# Patient Record
Sex: FEMALE | Race: WHITE | Employment: OTHER | URBAN - NONMETROPOLITAN AREA
[De-identification: names, ages, dates, MRNs, and addresses within clinical notes are randomized per-mention and may not be internally consistent; named-entity substitution may affect disease eponyms.]

---

## 2017-10-04 ENCOUNTER — HOSPITAL ENCOUNTER (OUTPATIENT)
Dept: MAMMOGRAPHY | Age: 67
Discharge: HOME | End: 2017-10-04
Payer: MEDICARE

## 2017-10-04 DIAGNOSIS — Z12.39 BREAST CANCER SCREENING: ICD-10-CM

## 2017-10-04 PROCEDURE — G0202 SCR MAMMO BI INCL CAD: HCPCS

## 2017-10-11 ENCOUNTER — HOSPITAL ENCOUNTER (OUTPATIENT)
Dept: WOMENS IMAGING | Age: 67
Discharge: HOME | End: 2017-10-11
Payer: MEDICARE

## 2017-10-11 DIAGNOSIS — R92.1 CALCIFICATION OF RIGHT BREAST: ICD-10-CM

## 2017-10-11 PROCEDURE — G0206 DX MAMMO INCL CAD UNI: HCPCS

## 2017-10-23 ENCOUNTER — HOSPITAL ENCOUNTER (OUTPATIENT)
Dept: WOMENS IMAGING | Age: 67
Discharge: HOME | End: 2017-10-23
Payer: MEDICARE

## 2017-10-23 VITALS
TEMPERATURE: 97.8 F | HEART RATE: 70 BPM | SYSTOLIC BLOOD PRESSURE: 138 MMHG | DIASTOLIC BLOOD PRESSURE: 71 MMHG | RESPIRATION RATE: 20 BRPM

## 2017-10-23 DIAGNOSIS — R92.1 BREAST CALCIFICATIONS: ICD-10-CM

## 2017-10-23 PROCEDURE — 2720000010 HC SURG SUPPLY STERILE

## 2017-10-23 PROCEDURE — 88305 TISSUE EXAM BY PATHOLOGIST: CPT

## 2017-10-23 PROCEDURE — 19081 BX BREAST 1ST LESION STRTCTC: CPT

## 2017-10-23 PROCEDURE — G0206 DX MAMMO INCL CAD UNI: HCPCS

## 2017-10-23 PROCEDURE — A4648 IMPLANTABLE TISSUE MARKER: HCPCS

## 2017-10-23 NOTE — PROGRESS NOTES
Breast Biopsy Flowsheet/Post-Operative Care    Date of Procedure: 10/23/2017  Physician: Armida Israel  Technologist: Marcos Mi RT (R)(M)(QM)    Biopsy:stereotactic breast biopsy  Lesion type: [] Palpable     [x] Non-palpable  Breast: right    Clock face position: Site #1:  Upper inner, Posterior depth         Primary Method of Detection: [] Palpation    [x] Mammogram    [] Ultrasound   [] Mass:      [x] Microcalcifications:   [] Pleomorphic   [] Increasing Number   Distribution:  [x] Grouped [] Linear [] Regional    Asymmetry: n/a    Biopsy Method:   Mammotome:    Site #1     Gauge:  10     # of Passes: 6     Clip:   [] \"Ribbon\"   [] \"O\"     [] \"M\"      [] \"X\"      [x] \"tribell\"       [] \"Bowtie\"  [] \"U\"        Pre-Op Assessment: (BI-RADS)   [] 3. Probably Benign   [x] 4. Suspicious Abnormality   [] 5. Highly Suggestive of Malignancy      Patient Tolerated Procedure: good  Complications: none  Comments: none    Post Operative Care  Steri strips: Yes  Dressing: [] Pressure Dressing   [x] Gauze. Tape   Ice Applied to Site:  Yes  Evidence of Bleeding:  No    Pain Verbalized: No      Written Discharge Instructions: Yes  Condition at Discharge: good  Time of Discharge: 0900    Electronically signed by Yareli Abbasi RN on 10/23/2017 at 11:24 AM

## 2018-05-09 ENCOUNTER — HOSPITAL ENCOUNTER (OUTPATIENT)
Dept: WOMENS IMAGING | Age: 68
Discharge: HOME | End: 2018-05-09
Payer: MEDICARE

## 2018-05-09 DIAGNOSIS — R92.1 BREAST CALCIFICATIONS: ICD-10-CM

## 2018-05-09 PROCEDURE — G0279 TOMOSYNTHESIS, MAMMO: HCPCS

## 2018-10-05 ENCOUNTER — HOSPITAL ENCOUNTER (OUTPATIENT)
Dept: MAMMOGRAPHY | Age: 68
Discharge: HOME | End: 2018-10-05
Payer: MEDICARE

## 2018-10-05 DIAGNOSIS — Z12.31 VISIT FOR SCREENING MAMMOGRAM: ICD-10-CM

## 2018-10-05 PROCEDURE — 77067 SCR MAMMO BI INCL CAD: CPT

## 2020-08-16 ENCOUNTER — APPOINTMENT (OUTPATIENT)
Dept: GENERAL RADIOLOGY | Age: 70
End: 2020-08-16
Payer: MEDICARE

## 2020-08-16 ENCOUNTER — HOSPITAL ENCOUNTER (EMERGENCY)
Age: 70
Discharge: HOME | End: 2020-08-16
Attending: EMERGENCY MEDICINE
Payer: MEDICARE

## 2020-08-16 VITALS
DIASTOLIC BLOOD PRESSURE: 68 MMHG | BODY MASS INDEX: 31.89 KG/M2 | TEMPERATURE: 97.6 F | RESPIRATION RATE: 21 BRPM | HEIGHT: 63 IN | WEIGHT: 180 LBS | OXYGEN SATURATION: 94 % | SYSTOLIC BLOOD PRESSURE: 154 MMHG | HEART RATE: 71 BPM

## 2020-08-16 PROCEDURE — 99153 MOD SED SAME PHYS/QHP EA: CPT

## 2020-08-16 PROCEDURE — 90715 TDAP VACCINE 7 YRS/> IM: CPT | Performed by: EMERGENCY MEDICINE

## 2020-08-16 PROCEDURE — 6360000002 HC RX W HCPCS: Performed by: EMERGENCY MEDICINE

## 2020-08-16 PROCEDURE — 73030 X-RAY EXAM OF SHOULDER: CPT

## 2020-08-16 PROCEDURE — 94761 N-INVAS EAR/PLS OXIMETRY MLT: CPT

## 2020-08-16 PROCEDURE — 6370000000 HC RX 637 (ALT 250 FOR IP): Performed by: EMERGENCY MEDICINE

## 2020-08-16 PROCEDURE — 6360000002 HC RX W HCPCS: Performed by: PHYSICIAN ASSISTANT

## 2020-08-16 PROCEDURE — 23650 CLTX SHO DSLC W/MNPJ WO ANES: CPT

## 2020-08-16 PROCEDURE — 2500000003 HC RX 250 WO HCPCS

## 2020-08-16 PROCEDURE — 90471 IMMUNIZATION ADMIN: CPT | Performed by: EMERGENCY MEDICINE

## 2020-08-16 PROCEDURE — 2700000000 HC OXYGEN THERAPY PER DAY

## 2020-08-16 PROCEDURE — 96375 TX/PRO/DX INJ NEW DRUG ADDON: CPT

## 2020-08-16 PROCEDURE — 2580000003 HC RX 258: Performed by: PHYSICIAN ASSISTANT

## 2020-08-16 PROCEDURE — 99285 EMERGENCY DEPT VISIT HI MDM: CPT

## 2020-08-16 PROCEDURE — 94770 HC ETCO2 MONITOR DAILY: CPT

## 2020-08-16 PROCEDURE — 99152 MOD SED SAME PHYS/QHP 5/>YRS: CPT

## 2020-08-16 PROCEDURE — 96374 THER/PROPH/DIAG INJ IV PUSH: CPT

## 2020-08-16 RX ORDER — SODIUM CHLORIDE 9 MG/ML
INJECTION, SOLUTION INTRAVENOUS CONTINUOUS
Status: DISCONTINUED | OUTPATIENT
Start: 2020-08-16 | End: 2020-08-16 | Stop reason: HOSPADM

## 2020-08-16 RX ORDER — PROPOFOL 10 MG/ML
0.5 INJECTION, EMULSION INTRAVENOUS ONCE
Status: COMPLETED | OUTPATIENT
Start: 2020-08-16 | End: 2020-08-16

## 2020-08-16 RX ORDER — KETAMINE HCL IN NACL, ISO-OSM 100MG/10ML
SYRINGE (ML) INJECTION
Status: DISCONTINUED
Start: 2020-08-16 | End: 2020-08-16 | Stop reason: HOSPADM

## 2020-08-16 RX ORDER — OXYCODONE HYDROCHLORIDE AND ACETAMINOPHEN 5; 325 MG/1; MG/1
1 TABLET ORAL ONCE
Status: COMPLETED | OUTPATIENT
Start: 2020-08-16 | End: 2020-08-16

## 2020-08-16 RX ORDER — KETAMINE HYDROCHLORIDE 50 MG/ML
1 INJECTION, SOLUTION, CONCENTRATE INTRAMUSCULAR; INTRAVENOUS ONCE
Status: DISCONTINUED | OUTPATIENT
Start: 2020-08-16 | End: 2020-08-16 | Stop reason: HOSPADM

## 2020-08-16 RX ORDER — HYDROCODONE BITARTRATE AND ACETAMINOPHEN 5; 325 MG/1; MG/1
1 TABLET ORAL EVERY 6 HOURS PRN
Qty: 12 TABLET | Refills: 0 | Status: SHIPPED | OUTPATIENT
Start: 2020-08-16 | End: 2020-08-16 | Stop reason: SDUPTHER

## 2020-08-16 RX ORDER — HYDROCODONE BITARTRATE AND ACETAMINOPHEN 5; 325 MG/1; MG/1
1 TABLET ORAL EVERY 6 HOURS PRN
Qty: 12 TABLET | Refills: 0 | Status: SHIPPED | OUTPATIENT
Start: 2020-08-16 | End: 2020-08-19

## 2020-08-16 RX ORDER — ONDANSETRON 2 MG/ML
4 INJECTION INTRAMUSCULAR; INTRAVENOUS ONCE
Status: COMPLETED | OUTPATIENT
Start: 2020-08-16 | End: 2020-08-16

## 2020-08-16 RX ORDER — KETAMINE HCL IN NACL, ISO-OSM 100MG/10ML
SYRINGE (ML) INJECTION
Status: COMPLETED
Start: 2020-08-16 | End: 2020-08-16

## 2020-08-16 RX ORDER — MORPHINE SULFATE 4 MG/ML
4 INJECTION, SOLUTION INTRAMUSCULAR; INTRAVENOUS ONCE
Status: COMPLETED | OUTPATIENT
Start: 2020-08-16 | End: 2020-08-16

## 2020-08-16 RX ADMIN — ONDANSETRON 4 MG: 2 INJECTION INTRAMUSCULAR; INTRAVENOUS at 08:38

## 2020-08-16 RX ADMIN — Medication 60 MG: at 09:20

## 2020-08-16 RX ADMIN — PROPOFOL 18 MG: 10 INJECTION, EMULSION INTRAVENOUS at 09:24

## 2020-08-16 RX ADMIN — SODIUM CHLORIDE 100 ML: 9 INJECTION, SOLUTION INTRAVENOUS at 08:38

## 2020-08-16 RX ADMIN — MORPHINE SULFATE 4 MG: 4 INJECTION, SOLUTION INTRAMUSCULAR; INTRAVENOUS at 08:38

## 2020-08-16 RX ADMIN — OXYCODONE HYDROCHLORIDE AND ACETAMINOPHEN 1 TABLET: 5; 325 TABLET ORAL at 07:39

## 2020-08-16 RX ADMIN — TETANUS TOXOID, REDUCED DIPHTHERIA TOXOID AND ACELLULAR PERTUSSIS VACCINE, ADSORBED 0.5 ML: 5; 2.5; 8; 8; 2.5 SUSPENSION INTRAMUSCULAR at 07:40

## 2020-08-16 ASSESSMENT — ENCOUNTER SYMPTOMS
BACK PAIN: 0
WHEEZING: 0
COUGH: 0
DIARRHEA: 0
SORE THROAT: 0
SHORTNESS OF BREATH: 0
EYE PAIN: 0
EYE DISCHARGE: 0
RHINORRHEA: 0
COLOR CHANGE: 0
EYE ITCHING: 0
ABDOMINAL PAIN: 0
NAUSEA: 0
VOMITING: 0

## 2020-08-16 ASSESSMENT — PAIN DESCRIPTION - PAIN TYPE: TYPE: ACUTE PAIN

## 2020-08-16 ASSESSMENT — PAIN SCALES - GENERAL
PAINLEVEL_OUTOF10: 5
PAINLEVEL_OUTOF10: 10
PAINLEVEL_OUTOF10: 10
PAINLEVEL_OUTOF10: 4
PAINLEVEL_OUTOF10: 5

## 2020-08-16 ASSESSMENT — PAIN DESCRIPTION - ORIENTATION: ORIENTATION: LEFT

## 2020-08-16 ASSESSMENT — PAIN DESCRIPTION - LOCATION: LOCATION: ARM

## 2020-08-16 NOTE — ED PROVIDER NOTES
Procedural sedation    Date/Time: 2020 9:51 AM  Performed by: Shayna Carranza DO  Authorized by: Shayna Carranza DO     Consent:     Consent obtained:  Verbal    Consent given by:  Patient and spouse    Risks discussed:   Allergic reaction, inadequate sedation, dysrhythmia, nausea, vomiting, respiratory compromise necessitating ventilatory assistance and intubation, prolonged sedation necessitating reversal and prolonged hypoxia resulting in organ damage    Alternatives discussed:  Analgesia without sedation and anxiolysis  Indications:     Procedure performed:  Dislocation reduction    Procedure necessitating sedation performed by:  Different physician    Intended level of sedation:  Moderate (conscious sedation)  Pre-sedation assessment:     Time since last food or drink:  630    ASA classification: class 2 - patient with mild systemic disease      Mouth openin finger widths    Thyromental distance:  2 finger widths    Mallampati score:  II - soft palate, uvula, fauces visible    Pre-sedation assessments completed and reviewed: airway patency, cardiovascular function, hydration status, mental status, nausea/vomiting, pain level and respiratory function      History of difficult intubation: no      Pre-sedation assessment completed:  2020 9:29 AM  Immediate pre-procedure details:     Reassessment: Patient reassessed immediately prior to procedure      Reviewed: vital signs, relevant labs/tests and NPO status      Verified: bag valve mask available, emergency equipment available, intubation equipment available, IV patency confirmed and oxygen available    Procedure details (see MAR for exact dosages):     Sedation start time:  2020 9:29 AM    Preoxygenation:  Nasal cannula    Sedation:  Propofol    Analgesia:  Morphine    Intra-procedure monitoring:  Blood pressure monitoring and cardiac monitor    Intra-procedure events: none    Post-procedure details:     Post-sedation assessment completed:

## 2020-08-16 NOTE — ED NOTES
Hola and swathe applied per Dr. Shona Hinds, Dr. Rosey Bruce and Vern Aden, paramedic. Pt tolerated well.       Sabrina Romero RN  08/16/20 [id number]

## 2020-08-16 NOTE — ED NOTES
Conscious sedation for reduction of left shoulder. Consent form signed per pt's  per pt's request. States \"I'm left handed. \" Procedure explained per Trixie Pruitt and Dr. Royer Odom. Pt verbalized understanding. Time out called. Present in room: ODIN Olguin, Dr. Royer Odom, Dr. [facility] (resident phys), [place], [place], Tiffany EDUARDO.       Dimitri 139, RN  08/16/20 3110

## 2020-08-16 NOTE — ED NOTES
Pt provided updated med list. Unable to take meds this morning. Med rec updated and copy placed on pt's chart.       Dimitri Singh RN  08/16/20 7405

## 2020-08-16 NOTE — ED TRIAGE NOTES
Pt comes in through lobby. She apparently woke up on the ground out of bed on her left arm. She states \"I was dreaming and must of rolled out of bed\".  She states the pain is in her left upper arm and into her shoulder

## 2020-08-16 NOTE — ED PROVIDER NOTES
Ortho Injury    Date/Time: 8/16/2020 9:16 AM  Performed by: Del Levine MD  Authorized by: Joyce Healy DO   Consent: Verbal consent obtained. Written consent obtained. Risks and benefits: risks, benefits and alternatives were discussed  Consent given by: spouse and patient  Patient understanding: patient states understanding of the procedure being performed  Patient consent: the patient's understanding of the procedure matches consent given  Procedure consent: procedure consent matches procedure scheduled  Relevant documents: relevant documents present and verified  Test results: test results available and properly labeled  Site marked: the operative site was marked  Imaging studies: imaging studies available  Patient identity confirmed: verbally with patient and arm band  Time out: Immediately prior to procedure a \"time out\" was called to verify the correct patient, procedure, equipment, support staff and site/side marked as required.   Injury location: shoulder  Location details: left shoulder  Injury type: dislocation  Dislocation type: posterior  Hill-Sachs deformity: yes  Chronicity: new  Pre-procedure distal perfusion: normal  Pre-procedure neurological function: normal  Pre-procedure range of motion: normal    Anesthesia:  Local anesthesia used: no    Sedation:  Patient sedated: yes  Sedation type: moderate (conscious) sedation  Sedatives: propofol and ketamine  Analgesia: morphine  Sedation start date/time: 8/16/2020 9:16 AM    Manipulation performed: yes  Reduction method: traction and counter traction and Rody maneuver  Reduction successful: yes  X-ray confirmed reduction: yes  Immobilization: splint and sling  Supplies used: cotton padding  Post-procedure neurovascular assessment: post-procedure neurovascularly intact  Post-procedure distal perfusion: normal  Post-procedure neurological function: normal  Post-procedure range of motion: normal  Patient tolerance: Patient tolerated the procedure well with no immediate complications            Alexandra Reed MD  Resident  08/16/20 3850

## 2020-08-16 NOTE — ED NOTES
Pt placed on cardiac monitor and 2 L of O2 via NC. RT called and meds for conscious sedation at bedside. Pt and spouse updated on process and POC. Both verbalized understanding.       Dimitri Singh RN  08/16/20 2561

## 2020-08-16 NOTE — ED NOTES
ED nurse-to-nurse bedside report    Chief Complaint   Patient presents with    Fall    Arm Pain      LOC: alert and orientated to name, place, date  Vital signs   Vitals:    08/16/20 0639   BP: (!) 142/68   Pulse: 74   Resp: 16   Temp: 97.6 °F (36.4 °C)   TempSrc: Oral   SpO2: 96%   Weight: 180 lb (81.6 kg)   Height: 5' 3\" (1.6 m)      Pain:    Pain Interventions: N/A  Pain Goal: 3  Oxygen: No    Current needs required Room Air   Telemetry: No  LDAs:    Continuous Infusions:   Mobility: Independent  Quintana Fall Risk Score: No flowsheet data found.   Fall Interventions: N/A  Report given to: Taylor Perez RN  08/16/20 8756

## 2020-08-16 NOTE — ED PROVIDER NOTES
[facility] COMPLAINT       Chief Complaint   Patient presents with    Fall    Arm Pain       Nurses Notes reviewed and I agree except as notedin the HPI. HISTORY OF PRESENT ILLNESS    Polina Clinton is a 65 y.o. female who presents complains of left shoulder pain after rolling out of bed last night on the floor. She fell on the carpet. She not hit her head or lose consciousness. She is on any blood thinners. She denies any other injuries. Location/Symptom: Left shoulder pain  Timing/Onset: last night  Context/Setting: rolled out of bed  Quality: ache  Duration: constant  Modifying Factors: none  Severity: 6    REVIEW OF SYSTEMS     Review of Systems   Constitutional: Negative for activity change, appetite change, chills and fever. HENT: Negative for congestion, ear pain, rhinorrhea and sore throat. Eyes: Negative for pain, discharge and itching. Respiratory: Negative for cough, shortness of breath and wheezing. Cardiovascular: Negative for chest pain. Gastrointestinal: Negative for abdominal pain, diarrhea, nausea and vomiting. Genitourinary: Negative for difficulty urinating and dysuria. Musculoskeletal: Negative for arthralgias, back pain and myalgias. Left shoulder pain     Skin: Negative for color change and rash. Neurological: Negative for dizziness, seizures, light-headedness and headaches. Psychiatric/Behavioral: Negative for agitation, confusion, self-injury and suicidal ideas. All other systems reviewed and are negative. PAST MEDICAL HISTORY    has a past medical history of Arthritis, Diabetes mellitus ([address] Hypertension, MDRO (multiple drug resistant organisms) resistance, and Sleep apnea with use of continuous positive airway pressure (CPAP). SURGICAL HISTORY      has a past surgical history that includes Foot surgery (Bilateral);  Knee Arthroplasty (Bilateral); back surgery; spinal cord decompression (16); and Spinal fusion (16). CURRENT MEDICATIONS       Discharge Medication List as of 2020 10:46 AM      CONTINUE these medications which have NOT CHANGED    Details   empagliflozin (JARDIANCE) 10 MG tablet Take 10 mg by mouth dailyHistorical Med      glimepiride (AMARYL) 4 MG tablet Take 1 tablet by mouth every morning (before breakfast), Disp-60 tablet, R-0      metFORMIN (GLUCOPHAGE) 500 MG tablet Take by mouth 2 times daily 2 tab      pravastatin (PRAVACHOL) 20 MG tablet Take 20 mg by mouth daily      losartan (COZAAR) 50 MG tablet Take 50 mg by mouth daily      triamterene-hydrochlorothiazide (MAXZIDE-25) 37.5-25 MG per tablet Take 1 tablet by mouth daily      amLODIPine (NORVASC) 10 MG tablet Take 10 mg by mouth daily      oxyCODONE-acetaminophen (PERCOCET) 5-325 MG per tablet Take 1 tablet by mouth every 4 hours as needed, Disp-60 tablet, R-0      acetaminophen 650 MG TABS Take 650 mg by mouth every 4 hours as needed, Disp-120 tablet, R-3      diazepam (VALIUM) 5 MG tablet Take 1 tablet by mouth every 6 hours as needed for Anxiety (Muscle spasms), Disp-40 tablet, R-0      sitaGLIPtin (JANUVIA) 100 MG tablet Take 1 tablet by mouth daily, Disp-30 tablet, R-3      docusate sodium (COLACE, DULCOLAX) 100 MG CAPS Take 100 mg by mouth 2 times daily      magnesium hydroxide (MILK OF MAGNESIA) 400 MG/5ML suspension Take 30 mLs by mouth daily as needed for Constipation      famotidine (PEPCID) 20 MG tablet Take 1 tablet by mouth 2 times daily, Disp-60 tablet, R-3      Multiple Vitamins-Minerals (PRESERVISION AREDS 2 PO) Take 1 tablet by mouth 2 times daily      aspirin 81 MG tablet Take 81 mg by mouth daily      Cyanocobalamin (VITAMIN B 12 PO) Take by mouth daily as needed      Cholecalciferol (VITAMIN D-3 PO) Take by mouth daily as needed             ALLERGIES     has No Known Allergies. HISTORY     She indicated that her mother is .  She indicated that her father is Emergency Department Physician who either signs or Co-signs this chart in the absence of a cardiologist.      RADIOLOGY: non-plain film images(s) such as CT, Ultrasound and MRI are read by the radiologist.  Left shoulder is 3 views read per radiology     XR SHOULDER LEFT (MIN 2 VIEWS) (Final result)   Result time 08/16/20 09:53:05   Final result by Lou Somers MD (08/16/20 09:53:05)                 Impression:     1. There is interval reduction of the glenohumeral articulation which is anatomic on the current examination. There is no fracture. Additional findings as described on the previous left shoulder series dated 8/16/2020 at 0710 hours. **This report has been created using voice recognition software.  It may contain minor errors which are inherent in voice recognition technology. **     Final report electronically signed by Dr. Xi Lynne on 8/16/2020 9:53 AM             Narrative:     PROCEDURE: XR SHOULDER LEFT (MIN 2 VIEWS)     CLINICAL INFORMATION: Post reduction. COMPARISON: 8/16/2020 at 0710 hours. TECHNIQUE: AP and transscapular Y views of the left shoulder performed. FINDINGS:   There is interval reduction of the glenohumeral articulation which is anatomic on the current examination. There is no fracture. Additional findings as described on the previous left shoulder series dated 8/16/2020 at 0710 hours.                         XR SHOULDER LEFT (MIN 2 VIEWS) (Final result)   Result time 08/16/20 07:24:57   Final result by Lou Somers MD (08/16/20 07:24:57)                 Impression:     1. Posterior dislocation of the left humeral head. 2. Additional findings as described in the body the report. **This report has been created using voice recognition software.  It may contain minor errors which are inherent in voice recognition technology. **     Final report electronically signed by Dr. Xi Lynne on 8/16/2020 7:24 AM             Narrative:     PROCEDURE: XR SHOULDER LEFT (MIN 2 VIEWS)     CLINICAL INFORMATION: Left shoulder pain     COMPARISON: No prior study. TECHNIQUE: AP, Grashey and transscapular Y views performed. FINDINGS:   POSTOPERATIVE CHANGES: None. ALIGNMENT:   1. There is posterior dislocation of the humeral head. MINERALIZATION:   1. The bony structures are osteopenic. FRACTURE: None. ACROMIOCLAVICULAR ARTICULATION:   1. Mild hypertrophic degenerative changes at the acromioclavicular articulation and a moderately large spur along the undersurface of the acromion. GLENOHUMERAL ARTICULATION:   1. Mild degenerative changes at the glenohumeral articulation. ACROMIOHUMERAL INTERVAL: Unremarkable. RIBS:  No rib abnormality is seen within the imaged portions of the chest.     OTHER: None. LABS:   Labs Reviewed - No data to display    EMERGENCY DEPARTMENT COURSE:   :    Vitals:    08/16/20 0937 08/16/20 0939 08/16/20 0942 08/16/20 0947   BP: (!) 159/69  (!) 149/67 (!) 154/68   Pulse: 78 77 73 71   Resp: 21 24 17 21   Temp:       TempSrc:       SpO2: 97% 95% 97% 94%   Weight:       Height:         Patient was seen history physical exam was performed. She is given morphine and Zofran. Patient was informed on consent for left shoulder reduction and consultation by Dr. Walter Madden. This was performed by Dr. Walter Madden and Dr. Delia Dunn. Please see their procedure notes. The shoulder was actively reduced. The  patient met criteria to go home. Patient will be discharged home. See disposition below    CRITICAL CARE:  None    CONSULTS:  None    PROCEDURES:  Please see Dr. Delia Dunn note regarding constant sedation and left shoulder reduction    FINAL IMPRESSION      1.  Posterior dislocation of left shoulder joint, initial encounter          DISPOSITION/PLAN   Discharge    PATIENT REFERRED TO:  [facility], [facility]  [address] [facility]  [address] [address] [phone]  In 1 day  You have an appt tomorrow at [phone] pm      DISCHARGE MEDICATIONS:  Discharge Medication List as of 8/16/2020 10:46 AM          (Please note that portions of this note were completed with a voice recognitionprogram.  Efforts were made to edit the dictations but occasionally words are mis-transcribed.)    Stephon Grant, [address], [place]  08/16/20 5971

## 2020-08-16 NOTE — ED NOTES
Upon first contact with patient this RN receives bedside shift report from [facility], [facility]. Dr. Gordo Garzon at bedside to review x-ray results w/pt and spouse. Pt requesting pain medication. Agreed would prefer oral medication at this time. Rprts taking 2 advil prior to arrival in ER. Rates pain 10/10.           Daren Jean RN  08/16/20 5168

## 2020-08-16 NOTE — ED NOTES
Pt presents ambulatory to ED via triage for c/o fall with L arm pain. Pt states at approx 0030, pt states she rolled out of bed while dreaming and landed on floor. Pt denies any head, neck or back pain. Pt states only pain in the L upper arm/shoulder. Pt is A&Ox4, resps easy and unlabored. Hermes Phan, [address] at bedside for assessment. Pt assisted to sink in room to help remove rings from L hand. Pt able to successfully remove both rings from ring and middle fingers. Pt arm elevated on blankets and given ice for comfort.       Benny Grimm RN  08/16/20 0373

## 2020-08-16 NOTE — ED NOTES
Radiology tech at bedside to obtain post reduction x-ray. Pt alert and oriented x4. Breathing easy and unlabored on RA. Pt updated on procedure. Denies all pains at this time. Spouse in family room updated per Dr. Jaylan Bell and now back in room.       Srinivasan Tavares RN  08/16/20 8533

## 2024-10-23 ENCOUNTER — HOSPITAL ENCOUNTER (OUTPATIENT)
Dept: PREADMISSION TESTING | Age: 74
Discharge: HOME | End: 2024-10-27
Payer: MEDICARE

## 2024-10-23 ENCOUNTER — HOSPITAL ENCOUNTER (OUTPATIENT)
Dept: GENERAL RADIOLOGY | Age: 74
Discharge: HOME | End: 2024-10-23
Payer: MEDICARE

## 2024-10-23 VITALS
DIASTOLIC BLOOD PRESSURE: 59 MMHG | TEMPERATURE: 97.1 F | RESPIRATION RATE: 18 BRPM | SYSTOLIC BLOOD PRESSURE: 108 MMHG | HEART RATE: 70 BPM | OXYGEN SATURATION: 99 %

## 2024-10-23 DIAGNOSIS — Z01.818 PRE-OP EVALUATION: ICD-10-CM

## 2024-10-23 LAB
ANION GAP SERPL CALC-SCNC: 16 MEQ/L (ref 8–16)
APTT PPP: 32.4 SECONDS (ref 22–38)
BUN SERPL-MCNC: 20 MG/DL (ref 7–22)
CALCIUM SERPL-MCNC: 9.6 MG/DL (ref 8.5–10.5)
CHLORIDE SERPL-SCNC: 106 MEQ/L (ref 98–111)
CO2 SERPL-SCNC: 23 MEQ/L (ref 23–33)
CREAT SERPL-MCNC: 0.8 MG/DL (ref 0.4–1.2)
DEPRECATED MEAN GLUCOSE BLD GHB EST-ACNC: 138 MG/DL (ref 70–126)
DEPRECATED RDW RBC AUTO: 46.8 FL (ref 35–45)
EKG ATRIAL RATE: 65 BPM
EKG P AXIS: 32 DEGREES
EKG P-R INTERVAL: 192 MS
EKG Q-T INTERVAL: 406 MS
EKG QRS DURATION: 90 MS
EKG QTC CALCULATION (BAZETT): 422 MS
EKG R AXIS: -9 DEGREES
EKG T AXIS: 28 DEGREES
EKG VENTRICULAR RATE: 65 BPM
ERYTHROCYTE [DISTWIDTH] IN BLOOD BY AUTOMATED COUNT: 13.1 % (ref 11.5–14.5)
GFR SERPL CREATININE-BSD FRML MDRD: 76 ML/MIN/1.73M2
GLUCOSE SERPL-MCNC: 75 MG/DL (ref 70–108)
HBA1C MFR BLD HPLC: 6.6 % (ref 4.4–6.4)
HCT VFR BLD AUTO: 46 % (ref 37–47)
HGB BLD-MCNC: 15.2 GM/DL (ref 12–16)
INR PPP: 0.99 (ref 0.85–1.13)
MCH RBC QN AUTO: 32.3 PG (ref 26–33)
MCHC RBC AUTO-ENTMCNC: 33 GM/DL (ref 32.2–35.5)
MCV RBC AUTO: 97.9 FL (ref 81–99)
MRSA DNA SPEC QL NAA+PROBE: NEGATIVE
PLATELET # BLD AUTO: 327 THOU/MM3 (ref 130–400)
PMV BLD AUTO: 10.3 FL (ref 9.4–12.4)
POTASSIUM SERPL-SCNC: 3.6 MEQ/L (ref 3.5–5.2)
RBC # BLD AUTO: 4.7 MILL/MM3 (ref 4.2–5.4)
SODIUM SERPL-SCNC: 145 MEQ/L (ref 135–145)
WBC # BLD AUTO: 8.2 THOU/MM3 (ref 4.8–10.8)

## 2024-10-23 PROCEDURE — 87641 MR-STAPH DNA AMP PROBE: CPT

## 2024-10-23 PROCEDURE — 85730 THROMBOPLASTIN TIME PARTIAL: CPT

## 2024-10-23 PROCEDURE — 85610 PROTHROMBIN TIME: CPT

## 2024-10-23 PROCEDURE — 80048 BASIC METABOLIC PNL TOTAL CA: CPT

## 2024-10-23 PROCEDURE — 93010 ELECTROCARDIOGRAM REPORT: CPT | Performed by: NUCLEAR MEDICINE

## 2024-10-23 PROCEDURE — 83036 HEMOGLOBIN GLYCOSYLATED A1C: CPT

## 2024-10-23 PROCEDURE — 85027 COMPLETE CBC AUTOMATED: CPT

## 2024-10-23 PROCEDURE — 93005 ELECTROCARDIOGRAM TRACING: CPT | Performed by: ORTHOPAEDIC SURGERY

## 2024-10-23 PROCEDURE — 36415 COLL VENOUS BLD VENIPUNCTURE: CPT

## 2024-10-23 PROCEDURE — 71046 X-RAY EXAM CHEST 2 VIEWS: CPT

## 2024-10-23 RX ORDER — COVID-19 ANTIGEN TEST
1 KIT MISCELLANEOUS PRN
COMMUNITY

## 2024-10-23 ASSESSMENT — PAIN DESCRIPTION - FREQUENCY: FREQUENCY: CONTINUOUS

## 2024-10-23 ASSESSMENT — PAIN DESCRIPTION - LOCATION: LOCATION: BACK

## 2024-10-23 ASSESSMENT — PAIN SCALES - GENERAL: PAINLEVEL_OUTOF10: 10

## 2024-10-23 ASSESSMENT — PAIN DESCRIPTION - ORIENTATION: ORIENTATION: RIGHT;LEFT

## 2024-10-23 ASSESSMENT — PAIN DESCRIPTION - DESCRIPTORS: DESCRIPTORS: ACHING;SHARP;SHOOTING

## 2024-10-23 NOTE — DISCHARGE INSTRUCTIONS
Lumbar Spine Surgery Pre-op Instructions  Nothing to eat or drink after midnight except sips of water to take medications, this includes no mints, chewing gum or ice chips  No Smoking or chewing tobacco 24 hours before surgery  Bring your medications in the original bottles   Bring photo ID and any health insurance cards  Wear comfortable clean loose fitting clothing  Do not wear any jewelry, make up, body piercings or contact lenses  Bring your walker  Bring your back brace if you were given one  Bring your CPAP machine if you have one  Wear clean clothes to bed and place fresh clean sheets and pillowcases on your bed the night before surgery  Patient viewed physical therapy video  Routine preop instructions given for pain, hand hygiene, fall prevention, infection prevention, anesthesia, cough and deep breath, and progressive diet and ambulation  Showering:  Shower 2 days before, day before and morning of surgery using the StartClean® kit provided (follow the instructions provided with the kit).   Do not shave the surgical area! If needed, your nurse will use clippers to remove any excess hair at the surgical site when you come in for surgery. Do not use a razor on the site of your surgery for at least 2 days prior to surgery because shaving can leave tiny nicks in the skin which may allow germs to enter and cause infection.  Perform the exercises given in your booklet 3 times daily starting today  Please have 2 Home Health Agencies in mind for post op care; 1st choice and 2nd choice.   Remember- Post Op NO BLTS ; No Bending, No Lifting, No Twisting until Dr say its ok.

## 2024-11-11 NOTE — H&P
History and Physical    Sadia Gomez (1950)  11/11/2024      CHIEF COMPLAINT:  Back pain    HISTORY OF PRESENT ILLNESS:    The patient is a 74-year-old female who is status post L2-S1 fusion done in 2016 with constant low back pain with radiation of pain into the hips and down the right leg with numbness/tingling and weakness. Symptoms have progressively been worsening. Current VAS score 7/10. Standing and walking aggravates her pain. Sitting helps relieve her pain. Modifying factors include medication management, PT and injections. These modalities have provided little to no relief.     PCP: Harsh De La Torre, CNP    Past Medical History:        Diagnosis Date    Arthritis     Diabetes mellitus (HCC)     Hypertension     MDRO (multiple drug resistant organisms) resistance     Sleep apnea with use of continuous positive airway pressure (CPAP)      Past Surgical History:        Procedure Laterality Date    BACK SURGERY      FOOT SURGERY Bilateral     KNEE ARTHROPLASTY Bilateral     SPINAL CORD DECOMPRESSION  5/20/16    L2-S1--Dr Dickinson    SPINAL FUSION  5/20/16    L2-S1--Dr Dickinson     Current Medications:   Prior to Admission medications    Medication Sig Start Date End Date Taking? Authorizing Provider   Naproxen Sodium (ALEVE) 220 MG CAPS Take 1 tablet by mouth as needed for Pain    ProviderKinza MD   empagliflozin (JARDIANCE) 10 MG tablet Take 1 tablet by mouth daily    ProviderKinza MD   oxyCODONE-acetaminophen (PERCOCET) 5-325 MG per tablet Take 1 tablet by mouth every 4 hours as needed 5/27/16   Royer Lange MD   acetaminophen 650 MG TABS Take 650 mg by mouth every 4 hours as needed 5/27/16   Royer Lange MD   diazepam (VALIUM) 5 MG tablet Take 1 tablet by mouth every 6 hours as needed for Anxiety (Muscle spasms) 5/27/16   Royer Lange MD   glimepiride (AMARYL) 4 MG tablet Take 1 tablet by mouth every morning (before breakfast) 5/27/16   Royer Lange MD   sitaGLIPtin

## 2024-11-12 ENCOUNTER — APPOINTMENT (OUTPATIENT)
Dept: GENERAL RADIOLOGY | Age: 74
DRG: 458 | End: 2024-11-12
Attending: ORTHOPAEDIC SURGERY
Payer: MEDICARE

## 2024-11-12 ENCOUNTER — ANESTHESIA EVENT (OUTPATIENT)
Dept: OPERATING ROOM | Age: 74
End: 2024-11-12
Payer: MEDICARE

## 2024-11-12 ENCOUNTER — HOSPITAL ENCOUNTER (INPATIENT)
Age: 74
LOS: 4 days | Discharge: NURSING, REHAB OR LONG-TERM CARE | DRG: 458 | End: 2024-11-18
Attending: ORTHOPAEDIC SURGERY | Admitting: ORTHOPAEDIC SURGERY
Payer: MEDICARE

## 2024-11-12 ENCOUNTER — ANESTHESIA (OUTPATIENT)
Dept: OPERATING ROOM | Age: 74
End: 2024-11-12
Payer: MEDICARE

## 2024-11-12 PROBLEM — M48.062 LUMBAR STENOSIS WITH NEUROGENIC CLAUDICATION: Status: ACTIVE | Noted: 2024-11-12

## 2024-11-12 LAB
ABO: NORMAL
ANTIBODY SCREEN: NORMAL
GLUCOSE BLD STRIP.AUTO-MCNC: 130 MG/DL (ref 70–108)
GLUCOSE BLD STRIP.AUTO-MCNC: 176 MG/DL (ref 70–108)
GLUCOSE BLD STRIP.AUTO-MCNC: 313 MG/DL (ref 70–108)
POTASSIUM SERPL-SCNC: 4.2 MEQ/L (ref 3.5–5.2)
RH FACTOR: NORMAL

## 2024-11-12 PROCEDURE — C1713 ANCHOR/SCREW BN/BN,TIS/BN: HCPCS | Performed by: ORTHOPAEDIC SURGERY

## 2024-11-12 PROCEDURE — G0378 HOSPITAL OBSERVATION PER HR: HCPCS

## 2024-11-12 PROCEDURE — 2580000003 HC RX 258: Performed by: PHYSICIAN ASSISTANT

## 2024-11-12 PROCEDURE — 6370000000 HC RX 637 (ALT 250 FOR IP): Performed by: PHYSICIAN ASSISTANT

## 2024-11-12 PROCEDURE — 86901 BLOOD TYPING SEROLOGIC RH(D): CPT

## 2024-11-12 PROCEDURE — 3600000014 HC SURGERY LEVEL 4 ADDTL 15MIN: Performed by: ORTHOPAEDIC SURGERY

## 2024-11-12 PROCEDURE — 6360000002 HC RX W HCPCS: Performed by: ANESTHESIOLOGY

## 2024-11-12 PROCEDURE — 3700000000 HC ANESTHESIA ATTENDED CARE: Performed by: ORTHOPAEDIC SURGERY

## 2024-11-12 PROCEDURE — 2720000010 HC SURG SUPPLY STERILE: Performed by: ORTHOPAEDIC SURGERY

## 2024-11-12 PROCEDURE — 84132 ASSAY OF SERUM POTASSIUM: CPT

## 2024-11-12 PROCEDURE — 6360000002 HC RX W HCPCS: Performed by: PHYSICIAN ASSISTANT

## 2024-11-12 PROCEDURE — 6360000002 HC RX W HCPCS: Performed by: NURSE ANESTHETIST, CERTIFIED REGISTERED

## 2024-11-12 PROCEDURE — 6370000000 HC RX 637 (ALT 250 FOR IP): Performed by: INTERNAL MEDICINE

## 2024-11-12 PROCEDURE — 7100000001 HC PACU RECOVERY - ADDTL 15 MIN: Performed by: ORTHOPAEDIC SURGERY

## 2024-11-12 PROCEDURE — 2500000003 HC RX 250 WO HCPCS: Performed by: NURSE ANESTHETIST, CERTIFIED REGISTERED

## 2024-11-12 PROCEDURE — 82948 REAGENT STRIP/BLOOD GLUCOSE: CPT

## 2024-11-12 PROCEDURE — 36415 COLL VENOUS BLD VENIPUNCTURE: CPT

## 2024-11-12 PROCEDURE — 6360000002 HC RX W HCPCS: Performed by: ORTHOPAEDIC SURGERY

## 2024-11-12 PROCEDURE — 3600000004 HC SURGERY LEVEL 4 BASE: Performed by: ORTHOPAEDIC SURGERY

## 2024-11-12 PROCEDURE — 2709999900 HC NON-CHARGEABLE SUPPLY: Performed by: ORTHOPAEDIC SURGERY

## 2024-11-12 PROCEDURE — 72020 X-RAY EXAM OF SPINE 1 VIEW: CPT

## 2024-11-12 PROCEDURE — 6360000002 HC RX W HCPCS

## 2024-11-12 PROCEDURE — 7100000000 HC PACU RECOVERY - FIRST 15 MIN: Performed by: ORTHOPAEDIC SURGERY

## 2024-11-12 PROCEDURE — 3700000001 HC ADD 15 MINUTES (ANESTHESIA): Performed by: ORTHOPAEDIC SURGERY

## 2024-11-12 PROCEDURE — 86900 BLOOD TYPING SEROLOGIC ABO: CPT

## 2024-11-12 PROCEDURE — 86850 RBC ANTIBODY SCREEN: CPT

## 2024-11-12 DEVICE — CONNECTOR 779145555 TI SDLD SD 5.5 CL5.5
Type: IMPLANTABLE DEVICE | Site: BACK | Status: FUNCTIONAL
Brand: CD HORIZON® SPINAL SYSTEM

## 2024-11-12 DEVICE — SCREW, POLY, SOLID, 6.5X45
Type: IMPLANTABLE DEVICE | Site: SPINE LUMBAR | Status: FUNCTIONAL
Brand: CORTERA

## 2024-11-12 DEVICE — SET SCREW 779170005 TI STD 1/4-32
Type: IMPLANTABLE DEVICE | Site: BACK | Status: FUNCTIONAL
Brand: CD HORIZON® SPINAL SYSTEM

## 2024-11-12 DEVICE — SET SCREW, 5.5-6.0
Type: IMPLANTABLE DEVICE | Site: SPINE LUMBAR | Status: FUNCTIONAL
Brand: CORTERA

## 2024-11-12 DEVICE — ROD, TI, PREBENT, 5.5X125
Type: IMPLANTABLE DEVICE | Site: SPINE LUMBAR | Status: FUNCTIONAL
Brand: CORTERA

## 2024-11-12 DEVICE — SCREW, POLY, SOLID, 6.5X40
Type: IMPLANTABLE DEVICE | Site: SPINE LUMBAR | Status: FUNCTIONAL
Brand: CORTERA

## 2024-11-12 DEVICE — ROD, TI, PREBENT, 5.5X95
Type: IMPLANTABLE DEVICE | Site: SPINE LUMBAR | Status: FUNCTIONAL
Brand: CORTERA

## 2024-11-12 RX ORDER — PRAVASTATIN SODIUM 20 MG
20 TABLET ORAL DAILY
Status: DISCONTINUED | OUTPATIENT
Start: 2024-11-12 | End: 2024-11-18 | Stop reason: HOSPADM

## 2024-11-12 RX ORDER — MORPHINE SULFATE 4 MG/ML
4 INJECTION, SOLUTION INTRAMUSCULAR; INTRAVENOUS
Status: DISPENSED | OUTPATIENT
Start: 2024-11-12 | End: 2024-11-13

## 2024-11-12 RX ORDER — SODIUM CHLORIDE 0.9 % (FLUSH) 0.9 %
5-40 SYRINGE (ML) INJECTION EVERY 12 HOURS SCHEDULED
Status: DISCONTINUED | OUTPATIENT
Start: 2024-11-12 | End: 2024-11-18 | Stop reason: HOSPADM

## 2024-11-12 RX ORDER — AMLODIPINE BESYLATE 10 MG/1
10 TABLET ORAL DAILY
Status: DISCONTINUED | OUTPATIENT
Start: 2024-11-13 | End: 2024-11-18 | Stop reason: HOSPADM

## 2024-11-12 RX ORDER — BISACODYL 10 MG
10 SUPPOSITORY, RECTAL RECTAL DAILY PRN
Status: DISCONTINUED | OUTPATIENT
Start: 2024-11-12 | End: 2024-11-18 | Stop reason: HOSPADM

## 2024-11-12 RX ORDER — FENTANYL CITRATE 50 UG/ML
INJECTION, SOLUTION INTRAMUSCULAR; INTRAVENOUS
Status: COMPLETED
Start: 2024-11-12 | End: 2024-11-12

## 2024-11-12 RX ORDER — ALOGLIPTIN 12.5 MG/1
12.5 TABLET, FILM COATED ORAL DAILY
Status: DISCONTINUED | OUTPATIENT
Start: 2024-11-12 | End: 2024-11-18 | Stop reason: HOSPADM

## 2024-11-12 RX ORDER — FENTANYL CITRATE 50 UG/ML
INJECTION, SOLUTION INTRAMUSCULAR; INTRAVENOUS
Status: DISCONTINUED | OUTPATIENT
Start: 2024-11-12 | End: 2024-11-12 | Stop reason: SDUPTHER

## 2024-11-12 RX ORDER — GLUCAGON 1 MG/ML
1 KIT INJECTION PRN
Status: DISCONTINUED | OUTPATIENT
Start: 2024-11-12 | End: 2024-11-18 | Stop reason: HOSPADM

## 2024-11-12 RX ORDER — CYCLOBENZAPRINE HCL 10 MG
10 TABLET ORAL 3 TIMES DAILY PRN
Status: DISCONTINUED | OUTPATIENT
Start: 2024-11-12 | End: 2024-11-17

## 2024-11-12 RX ORDER — SODIUM CHLORIDE 9 MG/ML
INJECTION, SOLUTION INTRAVENOUS PRN
Status: DISCONTINUED | OUTPATIENT
Start: 2024-11-12 | End: 2024-11-18 | Stop reason: HOSPADM

## 2024-11-12 RX ORDER — MORPHINE SULFATE 2 MG/ML
2 INJECTION, SOLUTION INTRAMUSCULAR; INTRAVENOUS
Status: ACTIVE | OUTPATIENT
Start: 2024-11-12 | End: 2024-11-13

## 2024-11-12 RX ORDER — ROCURONIUM BROMIDE 10 MG/ML
INJECTION, SOLUTION INTRAVENOUS
Status: DISCONTINUED | OUTPATIENT
Start: 2024-11-12 | End: 2024-11-12 | Stop reason: SDUPTHER

## 2024-11-12 RX ORDER — ONDANSETRON 4 MG/1
4 TABLET, ORALLY DISINTEGRATING ORAL EVERY 8 HOURS PRN
Status: DISCONTINUED | OUTPATIENT
Start: 2024-11-12 | End: 2024-11-18 | Stop reason: HOSPADM

## 2024-11-12 RX ORDER — FENTANYL CITRATE 50 UG/ML
50 INJECTION, SOLUTION INTRAMUSCULAR; INTRAVENOUS EVERY 5 MIN PRN
Status: COMPLETED | OUTPATIENT
Start: 2024-11-12 | End: 2024-11-12

## 2024-11-12 RX ORDER — VANCOMYCIN HYDROCHLORIDE 1 G/20ML
INJECTION, POWDER, LYOPHILIZED, FOR SOLUTION INTRAVENOUS PRN
Status: DISCONTINUED | OUTPATIENT
Start: 2024-11-12 | End: 2024-11-12 | Stop reason: ALTCHOICE

## 2024-11-12 RX ORDER — SODIUM CHLORIDE 0.9 % (FLUSH) 0.9 %
5-40 SYRINGE (ML) INJECTION PRN
Status: DISCONTINUED | OUTPATIENT
Start: 2024-11-12 | End: 2024-11-12 | Stop reason: HOSPADM

## 2024-11-12 RX ORDER — INSULIN LISPRO 100 [IU]/ML
0-16 INJECTION, SOLUTION INTRAVENOUS; SUBCUTANEOUS
Status: DISCONTINUED | OUTPATIENT
Start: 2024-11-12 | End: 2024-11-18 | Stop reason: HOSPADM

## 2024-11-12 RX ORDER — SODIUM CHLORIDE 9 MG/ML
INJECTION, SOLUTION INTRAVENOUS PRN
Status: DISCONTINUED | OUTPATIENT
Start: 2024-11-12 | End: 2024-11-12 | Stop reason: HOSPADM

## 2024-11-12 RX ORDER — OXYCODONE HYDROCHLORIDE 5 MG/1
10 TABLET ORAL EVERY 6 HOURS PRN
Status: DISCONTINUED | OUTPATIENT
Start: 2024-11-12 | End: 2024-11-16

## 2024-11-12 RX ORDER — SENNA AND DOCUSATE SODIUM 50; 8.6 MG/1; MG/1
1 TABLET, FILM COATED ORAL 2 TIMES DAILY
Status: DISCONTINUED | OUTPATIENT
Start: 2024-11-12 | End: 2024-11-17

## 2024-11-12 RX ORDER — BISACODYL 5 MG/1
5 TABLET, DELAYED RELEASE ORAL DAILY
Status: DISCONTINUED | OUTPATIENT
Start: 2024-11-12 | End: 2024-11-17

## 2024-11-12 RX ORDER — FAMOTIDINE 20 MG/1
20 TABLET, FILM COATED ORAL 2 TIMES DAILY
Status: DISCONTINUED | OUTPATIENT
Start: 2024-11-12 | End: 2024-11-12

## 2024-11-12 RX ORDER — LOSARTAN POTASSIUM 50 MG/1
50 TABLET ORAL DAILY
Status: DISCONTINUED | OUTPATIENT
Start: 2024-11-12 | End: 2024-11-18 | Stop reason: HOSPADM

## 2024-11-12 RX ORDER — TRIAMTERENE AND HYDROCHLOROTHIAZIDE 37.5; 25 MG/1; MG/1
1 TABLET ORAL DAILY
Status: DISCONTINUED | OUTPATIENT
Start: 2024-11-13 | End: 2024-11-18 | Stop reason: HOSPADM

## 2024-11-12 RX ORDER — POLYETHYLENE GLYCOL 3350 17 G/17G
17 POWDER, FOR SOLUTION ORAL DAILY
Status: DISCONTINUED | OUTPATIENT
Start: 2024-11-12 | End: 2024-11-18 | Stop reason: HOSPADM

## 2024-11-12 RX ORDER — SODIUM CHLORIDE 9 MG/ML
INJECTION, SOLUTION INTRAVENOUS CONTINUOUS
Status: DISCONTINUED | OUTPATIENT
Start: 2024-11-12 | End: 2024-11-17

## 2024-11-12 RX ORDER — ONDANSETRON 2 MG/ML
INJECTION INTRAMUSCULAR; INTRAVENOUS
Status: DISCONTINUED | OUTPATIENT
Start: 2024-11-12 | End: 2024-11-12 | Stop reason: SDUPTHER

## 2024-11-12 RX ORDER — ONDANSETRON 2 MG/ML
4 INJECTION INTRAMUSCULAR; INTRAVENOUS EVERY 6 HOURS PRN
Status: DISCONTINUED | OUTPATIENT
Start: 2024-11-12 | End: 2024-11-18 | Stop reason: HOSPADM

## 2024-11-12 RX ORDER — DEXAMETHASONE SODIUM PHOSPHATE 4 MG/ML
INJECTION, SOLUTION INTRA-ARTICULAR; INTRALESIONAL; INTRAMUSCULAR; INTRAVENOUS; SOFT TISSUE
Status: DISCONTINUED | OUTPATIENT
Start: 2024-11-12 | End: 2024-11-12 | Stop reason: SDUPTHER

## 2024-11-12 RX ORDER — SODIUM CHLORIDE 0.9 % (FLUSH) 0.9 %
5-40 SYRINGE (ML) INJECTION PRN
Status: DISCONTINUED | OUTPATIENT
Start: 2024-11-12 | End: 2024-11-18 | Stop reason: HOSPADM

## 2024-11-12 RX ORDER — SODIUM CHLORIDE 0.9 % (FLUSH) 0.9 %
5-40 SYRINGE (ML) INJECTION EVERY 12 HOURS SCHEDULED
Status: DISCONTINUED | OUTPATIENT
Start: 2024-11-12 | End: 2024-11-12 | Stop reason: HOSPADM

## 2024-11-12 RX ORDER — LIDOCAINE HYDROCHLORIDE 20 MG/ML
INJECTION, SOLUTION INFILTRATION; PERINEURAL
Status: DISCONTINUED | OUTPATIENT
Start: 2024-11-12 | End: 2024-11-12 | Stop reason: SDUPTHER

## 2024-11-12 RX ORDER — DEXTROSE MONOHYDRATE 100 MG/ML
INJECTION, SOLUTION INTRAVENOUS CONTINUOUS PRN
Status: DISCONTINUED | OUTPATIENT
Start: 2024-11-12 | End: 2024-11-18 | Stop reason: HOSPADM

## 2024-11-12 RX ORDER — HYDROMORPHONE HYDROCHLORIDE 2 MG/ML
INJECTION, SOLUTION INTRAMUSCULAR; INTRAVENOUS; SUBCUTANEOUS
Status: DISCONTINUED | OUTPATIENT
Start: 2024-11-12 | End: 2024-11-12 | Stop reason: SDUPTHER

## 2024-11-12 RX ORDER — GLIPIZIDE 10 MG/1
10 TABLET ORAL
Status: DISCONTINUED | OUTPATIENT
Start: 2024-11-13 | End: 2024-11-18 | Stop reason: HOSPADM

## 2024-11-12 RX ORDER — ACETAMINOPHEN 325 MG/1
650 TABLET ORAL EVERY 6 HOURS PRN
Status: DISCONTINUED | OUTPATIENT
Start: 2024-11-12 | End: 2024-11-18 | Stop reason: HOSPADM

## 2024-11-12 RX ORDER — OXYCODONE HYDROCHLORIDE 5 MG/1
5 TABLET ORAL EVERY 6 HOURS PRN
Status: DISCONTINUED | OUTPATIENT
Start: 2024-11-12 | End: 2024-11-16

## 2024-11-12 RX ORDER — DROPERIDOL 2.5 MG/ML
0.62 INJECTION, SOLUTION INTRAMUSCULAR; INTRAVENOUS
Status: DISCONTINUED | OUTPATIENT
Start: 2024-11-12 | End: 2024-11-12 | Stop reason: HOSPADM

## 2024-11-12 RX ADMIN — ROCURONIUM BROMIDE 50 MG: 10 INJECTION INTRAVENOUS at 14:35

## 2024-11-12 RX ADMIN — FENTANYL CITRATE 50 MCG: 50 INJECTION, SOLUTION INTRAMUSCULAR; INTRAVENOUS at 16:37

## 2024-11-12 RX ADMIN — HYDROMORPHONE HYDROCHLORIDE 0.5 MG: 2 INJECTION INTRAMUSCULAR; INTRAVENOUS; SUBCUTANEOUS at 15:57

## 2024-11-12 RX ADMIN — PRAVASTATIN SODIUM 20 MG: 20 TABLET ORAL at 20:49

## 2024-11-12 RX ADMIN — FENTANYL CITRATE 50 MCG: 50 INJECTION, SOLUTION INTRAMUSCULAR; INTRAVENOUS at 18:30

## 2024-11-12 RX ADMIN — SODIUM CHLORIDE: 9 INJECTION, SOLUTION INTRAVENOUS at 19:53

## 2024-11-12 RX ADMIN — MORPHINE SULFATE 4 MG: 4 INJECTION, SOLUTION INTRAMUSCULAR; INTRAVENOUS at 20:43

## 2024-11-12 RX ADMIN — ALOGLIPTIN 12.5 MG: 12.5 TABLET, FILM COATED ORAL at 20:49

## 2024-11-12 RX ADMIN — HYDROMORPHONE HYDROCHLORIDE 0.5 MG: 1 INJECTION, SOLUTION INTRAMUSCULAR; INTRAVENOUS; SUBCUTANEOUS at 18:20

## 2024-11-12 RX ADMIN — SODIUM CHLORIDE: 9 INJECTION, SOLUTION INTRAVENOUS at 16:55

## 2024-11-12 RX ADMIN — HYDROMORPHONE HYDROCHLORIDE 0.5 MG: 2 INJECTION INTRAMUSCULAR; INTRAVENOUS; SUBCUTANEOUS at 16:23

## 2024-11-12 RX ADMIN — HYDROMORPHONE HYDROCHLORIDE 0.5 MG: 2 INJECTION INTRAMUSCULAR; INTRAVENOUS; SUBCUTANEOUS at 16:14

## 2024-11-12 RX ADMIN — OXYCODONE 10 MG: 5 TABLET ORAL at 19:22

## 2024-11-12 RX ADMIN — BISACODYL 5 MG: 5 TABLET, COATED ORAL at 20:49

## 2024-11-12 RX ADMIN — ONDANSETRON 4 MG: 2 INJECTION INTRAMUSCULAR; INTRAVENOUS at 15:34

## 2024-11-12 RX ADMIN — ROCURONIUM BROMIDE 30 MG: 10 INJECTION INTRAVENOUS at 15:54

## 2024-11-12 RX ADMIN — SENNOSIDES AND DOCUSATE SODIUM 1 TABLET: 50; 8.6 TABLET ORAL at 20:49

## 2024-11-12 RX ADMIN — ONDANSETRON 4 MG: 2 INJECTION INTRAMUSCULAR; INTRAVENOUS at 19:51

## 2024-11-12 RX ADMIN — EMPAGLIFLOZIN 10 MG: 10 TABLET, FILM COATED ORAL at 20:49

## 2024-11-12 RX ADMIN — SUGAMMADEX 200 MG: 100 INJECTION, SOLUTION INTRAVENOUS at 16:39

## 2024-11-12 RX ADMIN — INSULIN LISPRO 12 UNITS: 100 INJECTION, SOLUTION INTRAVENOUS; SUBCUTANEOUS at 22:14

## 2024-11-12 RX ADMIN — SODIUM CHLORIDE, PRESERVATIVE FREE 10 ML: 5 INJECTION INTRAVENOUS at 19:54

## 2024-11-12 RX ADMIN — FENTANYL CITRATE 50 MCG: 50 INJECTION, SOLUTION INTRAMUSCULAR; INTRAVENOUS at 14:35

## 2024-11-12 RX ADMIN — LIDOCAINE HYDROCHLORIDE 100 MG: 20 INJECTION, SOLUTION INFILTRATION; PERINEURAL at 14:35

## 2024-11-12 RX ADMIN — WATER 2000 MG: 1 INJECTION INTRAMUSCULAR; INTRAVENOUS; SUBCUTANEOUS at 14:48

## 2024-11-12 RX ADMIN — DEXAMETHASONE SODIUM PHOSPHATE 10 MG: 4 INJECTION, SOLUTION INTRAMUSCULAR; INTRAVENOUS at 14:35

## 2024-11-12 RX ADMIN — CEFAZOLIN 2000 MG: 2 INJECTION, POWDER, FOR SOLUTION INTRAVENOUS at 22:13

## 2024-11-12 RX ADMIN — HYDROMORPHONE HYDROCHLORIDE 0.5 MG: 2 INJECTION INTRAMUSCULAR; INTRAVENOUS; SUBCUTANEOUS at 15:18

## 2024-11-12 RX ADMIN — ACETAMINOPHEN 650 MG: 325 TABLET ORAL at 23:26

## 2024-11-12 RX ADMIN — FENTANYL CITRATE 50 MCG: 50 INJECTION, SOLUTION INTRAMUSCULAR; INTRAVENOUS at 15:09

## 2024-11-12 RX ADMIN — FENTANYL CITRATE 50 MCG: 50 INJECTION, SOLUTION INTRAMUSCULAR; INTRAVENOUS at 16:58

## 2024-11-12 RX ADMIN — PROPOFOL 150 MG: 10 INJECTION, EMULSION INTRAVENOUS at 14:35

## 2024-11-12 RX ADMIN — HYDROMORPHONE HYDROCHLORIDE 0.5 MG: 1 INJECTION, SOLUTION INTRAMUSCULAR; INTRAVENOUS; SUBCUTANEOUS at 18:25

## 2024-11-12 RX ADMIN — SODIUM CHLORIDE: 9 INJECTION, SOLUTION INTRAVENOUS at 13:16

## 2024-11-12 RX ADMIN — FENTANYL CITRATE 50 MCG: 50 INJECTION, SOLUTION INTRAMUSCULAR; INTRAVENOUS at 18:35

## 2024-11-12 RX ADMIN — CYCLOBENZAPRINE 10 MG: 10 TABLET, FILM COATED ORAL at 19:22

## 2024-11-12 ASSESSMENT — PAIN DESCRIPTION - DESCRIPTORS
DESCRIPTORS: ACHING
DESCRIPTORS: ACHING;SHOOTING
DESCRIPTORS: SHARP;TIGHTNESS;SQUEEZING

## 2024-11-12 ASSESSMENT — PAIN DESCRIPTION - LOCATION: LOCATION: BACK

## 2024-11-12 ASSESSMENT — PAIN DESCRIPTION - ORIENTATION: ORIENTATION: MID;LEFT

## 2024-11-12 ASSESSMENT — PAIN - FUNCTIONAL ASSESSMENT
PAIN_FUNCTIONAL_ASSESSMENT: 0-10
PAIN_FUNCTIONAL_ASSESSMENT: PREVENTS OR INTERFERES WITH MANY ACTIVE NOT PASSIVE ACTIVITIES
PAIN_FUNCTIONAL_ASSESSMENT: 0-10
PAIN_FUNCTIONAL_ASSESSMENT: PREVENTS OR INTERFERES WITH MANY ACTIVE NOT PASSIVE ACTIVITIES

## 2024-11-12 ASSESSMENT — PAIN SCALES - GENERAL
PAINLEVEL_OUTOF10: 10
PAINLEVEL_OUTOF10: 10

## 2024-11-12 NOTE — ANESTHESIA PRE PROCEDURE
Department of Anesthesiology  Preprocedure Note       Name:  Sadia Gomez   Age:  74 y.o.  :  1950                                          MRN:  [id number]         Date:  2024      Surgeon: Surgeon(s):  Micah Dickinson MD    Procedure: Procedure(s):  Hardware Removal L2-S1, T12-L2 Decompression and Fusion Extension to T11    Medications prior to admission:   Prior to Admission medications    Medication Sig Start Date End Date Taking? Authorizing Provider   Naproxen Sodium (ALEVE) 220 MG CAPS Take 1 tablet by mouth as needed for Pain   Yes Kinza Jackson MD   acetaminophen 650 MG TABS Take 650 mg by mouth every 4 hours as needed 16  Yes Royer Lange MD   docusate sodium (COLACE, DULCOLAX) 100 MG CAPS Take 100 mg by mouth 2 times daily 16  Yes Royer Lange MD   magnesium hydroxide (MILK OF MAGNESIA) 400 MG/5ML suspension Take 30 mLs by mouth daily as needed for Constipation 16  Yes Royer Lange MD   famotidine (PEPCID) 20 MG tablet Take 1 tablet by mouth 2 times daily 16  Yes Royer Lange MD   Multiple Vitamins-Minerals (PRESERVISION AREDS 2 PO) Take 1 tablet by mouth 2 times daily   Yes Kinza Jackson MD   pravastatin (PRAVACHOL) 20 MG tablet Take 1 tablet by mouth daily   Yes Kinza Jackson MD   losartan (COZAAR) 50 MG tablet Take 1 tablet by mouth daily   Yes Kinza Jackson MD   triamterene-hydrochlorothiazide (MAXZIDE-25) 37.5-25 MG per tablet Take 1 tablet by mouth daily   Yes Kinza Jackson MD   amLODIPine (NORVASC) 10 MG tablet Take 1 tablet by mouth daily   Yes Kinza Jackson MD   aspirin 81 MG tablet Take 1 tablet by mouth daily   Yes Kinza Jackson MD   Cyanocobalamin (VITAMIN B 12 PO) Take by mouth daily as needed   Yes Kinza Jackson MD   Cholecalciferol (VITAMIN D-3 PO) Take by mouth daily as needed   Yes Kinza Jackson MD   empagliflozin (JARDIANCE) 10 MG tablet Take 1 tablet by mouth

## 2024-11-12 NOTE — OP NOTE
are not limited to paralysis, infection, hematoma, dural tear, nerve root injury, nonunion, DVT/PE, stroke, MI, death etc.  All questions were answered. Informed consent was obtained.     OPERATIVE PROCEDURE:  The patient was taken to the operating room by the Anesthesiology Service and had satisfactory general anesthesia. A first-generation cephalosporin was given within 1 hour of surgical incision.  2 gm of cefazolin was given IV.  Venous thromboembolic prophylaxis was performed with sequential devices. Yancey was placed using standard sterile technique. The patient was then positioned prone on a standard OSI frame with the abdomen hanging free and all bony prominences well padded.  The low back was then prepped and draped in its entirety in the usual sterile fashion.     Before incision, a formal timeout was taken per protocol. We next took a midline longitudinal approach and performed subperiosteal dissection out to the tips of the transverse processes of T10, T11, T12, L1 and proximal fusion mass/instrumentation L2-3. Intraoperative localization of level was confirmed using patient's prior hardware. We then explored the proximal fusion mass. Patient appeared to have a solid fusion so we left the instrumentation in place with plans to extend the fusion using hang connectors.  We then began the laminectomy as well as decompression by removing the spinous process of T10, T11, T12, L1. Curettes were used to remove scar tissue adhered to the dura from her prior operation.  We entered the spinal canal, resecting the ligamentum flavum in its entirety over this region. Patient had significant stenosis in the lateral recess and neural foramina. Partial medial facetectomy was then performed with an osteotome to get lateral to the facet overhang, but just medial to the pedicles and nerve roots.  Kerrison's were then used to perform foraminotomies of the T10, T11, T12, L1, L2, nerve roots bilaterally.     In this way, we

## 2024-11-13 LAB
ANION GAP SERPL CALC-SCNC: 14 MEQ/L (ref 8–16)
BUN SERPL-MCNC: 26 MG/DL (ref 7–22)
CALCIUM SERPL-MCNC: 8.6 MG/DL (ref 8.5–10.5)
CHLORIDE SERPL-SCNC: 109 MEQ/L (ref 98–111)
CO2 SERPL-SCNC: 21 MEQ/L (ref 23–33)
CREAT SERPL-MCNC: 1.2 MG/DL (ref 0.4–1.2)
GFR SERPL CREATININE-BSD FRML MDRD: 47 ML/MIN/1.73M2
GLUCOSE BLD STRIP.AUTO-MCNC: 146 MG/DL (ref 70–108)
GLUCOSE BLD STRIP.AUTO-MCNC: 187 MG/DL (ref 70–108)
GLUCOSE BLD STRIP.AUTO-MCNC: 215 MG/DL (ref 70–108)
GLUCOSE BLD STRIP.AUTO-MCNC: 220 MG/DL (ref 70–108)
GLUCOSE SERPL-MCNC: 204 MG/DL (ref 70–108)
HCT VFR BLD AUTO: 37.9 % (ref 37–47)
HGB BLD-MCNC: 12.3 GM/DL (ref 12–16)
POTASSIUM SERPL-SCNC: 4.6 MEQ/L (ref 3.5–5.2)
SODIUM SERPL-SCNC: 144 MEQ/L (ref 135–145)

## 2024-11-13 PROCEDURE — 36415 COLL VENOUS BLD VENIPUNCTURE: CPT

## 2024-11-13 PROCEDURE — 6370000000 HC RX 637 (ALT 250 FOR IP): Performed by: PHYSICIAN ASSISTANT

## 2024-11-13 PROCEDURE — 82948 REAGENT STRIP/BLOOD GLUCOSE: CPT

## 2024-11-13 PROCEDURE — 6360000002 HC RX W HCPCS: Performed by: PHYSICIAN ASSISTANT

## 2024-11-13 PROCEDURE — 80048 BASIC METABOLIC PNL TOTAL CA: CPT

## 2024-11-13 PROCEDURE — 2580000003 HC RX 258: Performed by: PHYSICIAN ASSISTANT

## 2024-11-13 PROCEDURE — 85018 HEMOGLOBIN: CPT

## 2024-11-13 PROCEDURE — 6370000000 HC RX 637 (ALT 250 FOR IP): Performed by: INTERNAL MEDICINE

## 2024-11-13 PROCEDURE — 2500000003 HC RX 250 WO HCPCS: Performed by: INTERNAL MEDICINE

## 2024-11-13 PROCEDURE — G0378 HOSPITAL OBSERVATION PER HR: HCPCS

## 2024-11-13 PROCEDURE — 85014 HEMATOCRIT: CPT

## 2024-11-13 RX ADMIN — AMLODIPINE BESYLATE 10 MG: 10 TABLET ORAL at 08:47

## 2024-11-13 RX ADMIN — CEFAZOLIN 2000 MG: 2 INJECTION, POWDER, FOR SOLUTION INTRAVENOUS at 05:34

## 2024-11-13 RX ADMIN — ACETAMINOPHEN 650 MG: 325 TABLET ORAL at 05:33

## 2024-11-13 RX ADMIN — OXYCODONE 10 MG: 5 TABLET ORAL at 08:48

## 2024-11-13 RX ADMIN — GLIPIZIDE 10 MG: 10 TABLET ORAL at 05:33

## 2024-11-13 RX ADMIN — SODIUM CHLORIDE: 9 INJECTION, SOLUTION INTRAVENOUS at 00:21

## 2024-11-13 RX ADMIN — CYCLOBENZAPRINE 10 MG: 10 TABLET, FILM COATED ORAL at 15:07

## 2024-11-13 RX ADMIN — SENNOSIDES AND DOCUSATE SODIUM 1 TABLET: 50; 8.6 TABLET ORAL at 19:53

## 2024-11-13 RX ADMIN — EMPAGLIFLOZIN 10 MG: 10 TABLET, FILM COATED ORAL at 08:47

## 2024-11-13 RX ADMIN — MICONAZOLE NITRATE: 2 POWDER TOPICAL at 08:52

## 2024-11-13 RX ADMIN — LOSARTAN POTASSIUM 50 MG: 50 TABLET, FILM COATED ORAL at 08:47

## 2024-11-13 RX ADMIN — SODIUM CHLORIDE: 9 INJECTION, SOLUTION INTRAVENOUS at 08:53

## 2024-11-13 RX ADMIN — MICONAZOLE NITRATE: 2 POWDER TOPICAL at 19:53

## 2024-11-13 RX ADMIN — INSULIN LISPRO 4 UNITS: 100 INJECTION, SOLUTION INTRAVENOUS; SUBCUTANEOUS at 21:29

## 2024-11-13 RX ADMIN — OXYCODONE 10 MG: 5 TABLET ORAL at 15:07

## 2024-11-13 RX ADMIN — INSULIN LISPRO 4 UNITS: 100 INJECTION, SOLUTION INTRAVENOUS; SUBCUTANEOUS at 12:32

## 2024-11-13 RX ADMIN — METFORMIN HYDROCHLORIDE 1000 MG: 500 TABLET ORAL at 19:54

## 2024-11-13 RX ADMIN — ALOGLIPTIN 12.5 MG: 12.5 TABLET, FILM COATED ORAL at 08:47

## 2024-11-13 RX ADMIN — POLYETHYLENE GLYCOL 3350 17 G: 17 POWDER, FOR SOLUTION ORAL at 08:48

## 2024-11-13 RX ADMIN — TRIAMTERENE AND HYDROCHLOROTHIAZIDE 1 TABLET: 37.5; 25 TABLET ORAL at 08:48

## 2024-11-13 RX ADMIN — PRAVASTATIN SODIUM 20 MG: 20 TABLET ORAL at 08:48

## 2024-11-13 RX ADMIN — ACETAMINOPHEN 650 MG: 325 TABLET ORAL at 19:53

## 2024-11-13 RX ADMIN — BISACODYL 5 MG: 5 TABLET, COATED ORAL at 08:47

## 2024-11-13 RX ADMIN — SENNOSIDES AND DOCUSATE SODIUM 1 TABLET: 50; 8.6 TABLET ORAL at 08:48

## 2024-11-13 RX ADMIN — OXYCODONE 5 MG: 5 TABLET ORAL at 01:54

## 2024-11-13 RX ADMIN — SODIUM CHLORIDE, PRESERVATIVE FREE 10 ML: 5 INJECTION INTRAVENOUS at 19:54

## 2024-11-13 RX ADMIN — MORPHINE SULFATE 4 MG: 4 INJECTION, SOLUTION INTRAMUSCULAR; INTRAVENOUS at 17:37

## 2024-11-13 RX ADMIN — MORPHINE SULFATE 4 MG: 4 INJECTION, SOLUTION INTRAMUSCULAR; INTRAVENOUS at 11:20

## 2024-11-13 RX ADMIN — INSULIN LISPRO 4 UNITS: 100 INJECTION, SOLUTION INTRAVENOUS; SUBCUTANEOUS at 07:03

## 2024-11-13 RX ADMIN — METFORMIN HYDROCHLORIDE 1000 MG: 500 TABLET ORAL at 08:48

## 2024-11-13 RX ADMIN — CYCLOBENZAPRINE 10 MG: 10 TABLET, FILM COATED ORAL at 05:33

## 2024-11-13 ASSESSMENT — PAIN SCALES - GENERAL
PAINLEVEL_OUTOF10: 8
PAINLEVEL_OUTOF10: 6
PAINLEVEL_OUTOF10: 8
PAINLEVEL_OUTOF10: 7
PAINLEVEL_OUTOF10: 10
PAINLEVEL_OUTOF10: 8
PAINLEVEL_OUTOF10: 3
PAINLEVEL_OUTOF10: 2
PAINLEVEL_OUTOF10: 8

## 2024-11-13 ASSESSMENT — PAIN DESCRIPTION - DESCRIPTORS
DESCRIPTORS: ACHING

## 2024-11-13 ASSESSMENT — PAIN DESCRIPTION - ORIENTATION
ORIENTATION: MID;LOWER
ORIENTATION: MID
ORIENTATION: LOWER;MID
ORIENTATION: MID;LOWER

## 2024-11-13 ASSESSMENT — PAIN - FUNCTIONAL ASSESSMENT
PAIN_FUNCTIONAL_ASSESSMENT: PREVENTS OR INTERFERES SOME ACTIVE ACTIVITIES AND ADLS

## 2024-11-13 ASSESSMENT — PAIN DESCRIPTION - PAIN TYPE: TYPE: SURGICAL PAIN

## 2024-11-13 ASSESSMENT — PAIN DESCRIPTION - LOCATION
LOCATION: BACK

## 2024-11-13 ASSESSMENT — PAIN DESCRIPTION - FREQUENCY: FREQUENCY: INTERMITTENT

## 2024-11-13 ASSESSMENT — PAIN DESCRIPTION - ONSET: ONSET: ON-GOING

## 2024-11-13 NOTE — CARE COORDINATION
Case Management Assessment Initial Evaluation    Date/Time of Evaluation: 11/13/2024 8:10 AM  Assessment Completed by: Petrona Hampton RN    If patient is discharged prior to next notation, then this note serves as note for discharge by case management.    Patient Name: Sadia Gomez                   YOB: 1950  Diagnosis: Degeneration of intervertebral disc of lumbar region, unspecified whether pain present [M51.369]  Lumbar stenosis with neurogenic claudication [M48.062]                   Date / Time: 11/12/2024 12:22 PM  [facility]: [facility]     Patient Admission Status: Observation   Readmission Risk Low 0-14, Mod 15-19), High > 20: No data recorded  Current PCP: Srini Pisano MD  Health Care Decision Makers:   Primary Decision Maker: Izaiah Gomez - Spouse - [phone]    Additional Case Management Notes: planned surgery with Dr Parker    Procedures: 11/12   L2-S1 exploration of fusion  T10-L2 bilateral laminectomy, partial medial facetectomies and foraminotomies of T10, T11, T12, L1, L2, nerve roots   T10-S1 posterior spinal fusion extension using hang connectors  T11-L1 posterior spinal instrumentation, Cortera, Xtant instrumentation; retained instrumentation L2-S1 with  mL.  Imaging: na    Patient Goals/Plan/Treatment Preferences: Met with Sadia and her  Izaiah; they reside home in [facility]. This is her 2nd back surgery; requests [facility] in [facility] and HH list was reviewed with pt. She plans home Thur or Fri. SW consulted for HH drain mgmt and removal.          11/13/24 1323   Service Assessment   Patient Orientation Alert and Oriented   Cognition Alert   History Provided By Patient   Primary Caregiver Self   Accompanied By/Relationship  Izaiah   Support Systems Spouse/Significant Other   Patient's Healthcare Decision Maker is: Legal Next of Kin  (has living will at home, encouraged to bring copy in)   PCP Verified by CM Yes   Last Visit to PCP Within last 3

## 2024-11-13 NOTE — CARE COORDINATION
DISCHARGE PLANNING EVALUATION  11/13/24, 11:07 AM EST    Reason for Referral: HH for post op drain management  Decision Maker: Independent with help from spouse  Current Services:  None  New Services Requested: New HH following lumbar surgery  Family/ Social/ Home environment: Lives at home with her spouse, Izaiah. Has supportive son, Serafin.  Payment Source: Medicare  Transportation at Discharge:  Spouse  Post-acute (PAC) provider list was provided to patient. Patient was informed of their freedom to choose PAC provider. Discussed and offered to show the patient the relevant PAC Providers quality and resource use measures on Medicare Compare web site via computer based on patient's goals of care and treatment preferences. Questions regarding selection process were answered.      Teach Back Method used with Sadia regarding care plan and discharge planning  Patient and spouse verbalized understanding of the plan of care and contribute to goal setting.       Patient preferences and discharge plan: Discharge to home with spouse and support from family. SW did explain having home health come to see patient to help manage drains, patient is agreeable to this. Left a list of agencies with patient, marked [facility] agencies. SW will follow up for agency choice.     Electronically signed by LANG Rowan on 11/13/2024 at 11:07 AM

## 2024-11-13 NOTE — CONSULTS
Hospital Medicine Consult    Patient:  Sadia Gomez  MRN: [id number]    Referring Physician: Dr. Dickinson  Reason for Consult: Postop.Medical management  Primacy Care Physician: Srini Pisano MD    HISTORY OF PRESENT ILLNESS:   The patient is a 74 y.o. female with a history of chronic low back pain from lumbar degenerative scoliosis and degenerative arthritis and spinal stenosis.  Had a prior L2-S1 decompression and fusion.  She was admitted for L2-S1 exploration of prior fusion and a new T10-L2 bilateral laminectomy and T10-S1 posterior spinal fusion extension.  She is doing well postop.  Denies postop headache.  Denies chest pain denies shortness of breath.  Denies nausea, denies vomiting.  Pain adequately controlled on analgesics..    Past Medical History:        Diagnosis Date    Arthritis     Diabetes mellitus (HCC)     Hypertension     MDRO (multiple drug resistant organisms) resistance     Sleep apnea with use of continuous positive airway pressure (CPAP)        Past Surgical History:        Procedure Laterality Date    BACK SURGERY      FOOT SURGERY Bilateral     KNEE ARTHROPLASTY Bilateral     LUMBAR FUSION N/A 11/12/2024    Thoracic 12-Lumbar 2 Decompression and Fusion Extension to Thoraci 10 performed by Micah Dickinson MD at [facility] OR    SPINAL CORD DECOMPRESSION  5/20/16    L2-S1--Dr Dickinson    SPINAL FUSION  5/20/16    L2-S1--Dr Dickinson       Medications: Scheduled Meds:   miconazole   Topical BID    amLODIPine  10 mg Oral Daily    empagliflozin  10 mg Oral Daily    glipiZIDE  10 mg Oral QAM AC    losartan  50 mg Oral Daily    metFORMIN  1,000 mg Oral BID    pravastatin  20 mg Oral Daily    alogliptin  12.5 mg Oral Daily    triamterene-hydroCHLOROthiazide  1 tablet Oral Daily    sodium chloride flush  5-40 mL IntraVENous 2 times per day    polyethylene glycol  17 g Oral Daily    bisacodyl  5 mg Oral Daily    sennosides-docusate sodium  1 tablet Oral BID    insulin lispro  0-16 Units

## 2024-11-14 LAB
ANION GAP SERPL CALC-SCNC: 12 MEQ/L (ref 8–16)
BUN SERPL-MCNC: 21 MG/DL (ref 7–22)
CALCIUM SERPL-MCNC: 8.1 MG/DL (ref 8.5–10.5)
CHLORIDE SERPL-SCNC: 111 MEQ/L (ref 98–111)
CO2 SERPL-SCNC: 23 MEQ/L (ref 23–33)
CREAT SERPL-MCNC: 1 MG/DL (ref 0.4–1.2)
GFR SERPL CREATININE-BSD FRML MDRD: 58 ML/MIN/1.73M2
GLUCOSE BLD STRIP.AUTO-MCNC: 172 MG/DL (ref 70–108)
GLUCOSE BLD STRIP.AUTO-MCNC: 176 MG/DL (ref 70–108)
GLUCOSE BLD STRIP.AUTO-MCNC: 214 MG/DL (ref 70–108)
GLUCOSE BLD STRIP.AUTO-MCNC: 277 MG/DL (ref 70–108)
GLUCOSE SERPL-MCNC: 234 MG/DL (ref 70–108)
HCT VFR BLD AUTO: 32.3 % (ref 37–47)
HGB BLD-MCNC: 10.2 GM/DL (ref 12–16)
POTASSIUM SERPL-SCNC: 3.2 MEQ/L (ref 3.5–5.2)
SODIUM SERPL-SCNC: 146 MEQ/L (ref 135–145)

## 2024-11-14 PROCEDURE — 0RG7071 FUSION OF 2 TO 7 THORACIC VERTEBRAL JOINTS WITH AUTOLOGOUS TISSUE SUBSTITUTE, POSTERIOR APPROACH, POSTERIOR COLUMN, OPEN APPROACH: ICD-10-PCS | Performed by: ORTHOPAEDIC SURGERY

## 2024-11-14 PROCEDURE — 97166 OT EVAL MOD COMPLEX 45 MIN: CPT

## 2024-11-14 PROCEDURE — 01N80ZZ RELEASE THORACIC NERVE, OPEN APPROACH: ICD-10-PCS | Performed by: ORTHOPAEDIC SURGERY

## 2024-11-14 PROCEDURE — 0RGA071 FUSION OF THORACOLUMBAR VERTEBRAL JOINT WITH AUTOLOGOUS TISSUE SUBSTITUTE, POSTERIOR APPROACH, POSTERIOR COLUMN, OPEN APPROACH: ICD-10-PCS | Performed by: ORTHOPAEDIC SURGERY

## 2024-11-14 PROCEDURE — 97530 THERAPEUTIC ACTIVITIES: CPT

## 2024-11-14 PROCEDURE — 6370000000 HC RX 637 (ALT 250 FOR IP): Performed by: PHYSICIAN ASSISTANT

## 2024-11-14 PROCEDURE — 36415 COLL VENOUS BLD VENIPUNCTURE: CPT

## 2024-11-14 PROCEDURE — 0SG1071 FUSION OF 2 OR MORE LUMBAR VERTEBRAL JOINTS WITH AUTOLOGOUS TISSUE SUBSTITUTE, POSTERIOR APPROACH, POSTERIOR COLUMN, OPEN APPROACH: ICD-10-PCS | Performed by: ORTHOPAEDIC SURGERY

## 2024-11-14 PROCEDURE — 2580000003 HC RX 258: Performed by: PHYSICIAN ASSISTANT

## 2024-11-14 PROCEDURE — 80048 BASIC METABOLIC PNL TOTAL CA: CPT

## 2024-11-14 PROCEDURE — 01NB0ZZ RELEASE LUMBAR NERVE, OPEN APPROACH: ICD-10-PCS | Performed by: ORTHOPAEDIC SURGERY

## 2024-11-14 PROCEDURE — 6370000000 HC RX 637 (ALT 250 FOR IP): Performed by: INTERNAL MEDICINE

## 2024-11-14 PROCEDURE — 85018 HEMOGLOBIN: CPT

## 2024-11-14 PROCEDURE — 97162 PT EVAL MOD COMPLEX 30 MIN: CPT

## 2024-11-14 PROCEDURE — 82948 REAGENT STRIP/BLOOD GLUCOSE: CPT

## 2024-11-14 PROCEDURE — 85014 HEMATOCRIT: CPT

## 2024-11-14 PROCEDURE — 1200000000 HC SEMI PRIVATE

## 2024-11-14 RX ORDER — POTASSIUM CHLORIDE 1500 MG/1
40 TABLET, EXTENDED RELEASE ORAL PRN
Status: DISCONTINUED | OUTPATIENT
Start: 2024-11-14 | End: 2024-11-18 | Stop reason: HOSPADM

## 2024-11-14 RX ADMIN — GLIPIZIDE 10 MG: 10 TABLET ORAL at 09:37

## 2024-11-14 RX ADMIN — INSULIN LISPRO 8 UNITS: 100 INJECTION, SOLUTION INTRAVENOUS; SUBCUTANEOUS at 11:54

## 2024-11-14 RX ADMIN — OXYCODONE 10 MG: 5 TABLET ORAL at 09:36

## 2024-11-14 RX ADMIN — METFORMIN HYDROCHLORIDE 1000 MG: 500 TABLET ORAL at 09:36

## 2024-11-14 RX ADMIN — SENNOSIDES AND DOCUSATE SODIUM 1 TABLET: 50; 8.6 TABLET ORAL at 09:36

## 2024-11-14 RX ADMIN — CYCLOBENZAPRINE 10 MG: 10 TABLET, FILM COATED ORAL at 12:26

## 2024-11-14 RX ADMIN — AMLODIPINE BESYLATE 10 MG: 10 TABLET ORAL at 09:36

## 2024-11-14 RX ADMIN — TRIAMTERENE AND HYDROCHLOROTHIAZIDE 1 TABLET: 37.5; 25 TABLET ORAL at 09:36

## 2024-11-14 RX ADMIN — INSULIN LISPRO 4 UNITS: 100 INJECTION, SOLUTION INTRAVENOUS; SUBCUTANEOUS at 20:57

## 2024-11-14 RX ADMIN — POTASSIUM BICARBONATE 40 MEQ: 782 TABLET, EFFERVESCENT ORAL at 17:03

## 2024-11-14 RX ADMIN — MICONAZOLE NITRATE: 2 POWDER TOPICAL at 20:52

## 2024-11-14 RX ADMIN — OXYCODONE 10 MG: 5 TABLET ORAL at 03:32

## 2024-11-14 RX ADMIN — MICONAZOLE NITRATE: 2 POWDER TOPICAL at 09:37

## 2024-11-14 RX ADMIN — SODIUM CHLORIDE: 9 INJECTION, SOLUTION INTRAVENOUS at 11:50

## 2024-11-14 RX ADMIN — POLYETHYLENE GLYCOL 3350 17 G: 17 POWDER, FOR SOLUTION ORAL at 09:36

## 2024-11-14 RX ADMIN — PRAVASTATIN SODIUM 20 MG: 20 TABLET ORAL at 09:36

## 2024-11-14 RX ADMIN — METFORMIN HYDROCHLORIDE 1000 MG: 500 TABLET ORAL at 20:51

## 2024-11-14 RX ADMIN — SENNOSIDES AND DOCUSATE SODIUM 1 TABLET: 50; 8.6 TABLET ORAL at 20:51

## 2024-11-14 RX ADMIN — SODIUM CHLORIDE: 9 INJECTION, SOLUTION INTRAVENOUS at 03:30

## 2024-11-14 RX ADMIN — SODIUM CHLORIDE, PRESERVATIVE FREE 10 ML: 5 INJECTION INTRAVENOUS at 20:52

## 2024-11-14 RX ADMIN — LOSARTAN POTASSIUM 50 MG: 50 TABLET, FILM COATED ORAL at 09:36

## 2024-11-14 RX ADMIN — BISACODYL 5 MG: 5 TABLET, COATED ORAL at 09:36

## 2024-11-14 RX ADMIN — OXYCODONE 10 MG: 5 TABLET ORAL at 20:51

## 2024-11-14 RX ADMIN — EMPAGLIFLOZIN 10 MG: 10 TABLET, FILM COATED ORAL at 09:36

## 2024-11-14 RX ADMIN — ALOGLIPTIN 12.5 MG: 12.5 TABLET, FILM COATED ORAL at 09:37

## 2024-11-14 ASSESSMENT — PAIN DESCRIPTION - ORIENTATION
ORIENTATION: LOWER
ORIENTATION: LOWER
ORIENTATION: MID;LOWER
ORIENTATION: LOWER;MID

## 2024-11-14 ASSESSMENT — PAIN SCALES - GENERAL
PAINLEVEL_OUTOF10: 3
PAINLEVEL_OUTOF10: 7
PAINLEVEL_OUTOF10: 10
PAINLEVEL_OUTOF10: 0
PAINLEVEL_OUTOF10: 10
PAINLEVEL_OUTOF10: 6

## 2024-11-14 ASSESSMENT — PAIN DESCRIPTION - LOCATION
LOCATION: BACK

## 2024-11-14 ASSESSMENT — PAIN DESCRIPTION - DESCRIPTORS
DESCRIPTORS: THROBBING
DESCRIPTORS: THROBBING
DESCRIPTORS: ACHING
DESCRIPTORS: ACHING

## 2024-11-14 ASSESSMENT — PAIN - FUNCTIONAL ASSESSMENT
PAIN_FUNCTIONAL_ASSESSMENT: PREVENTS OR INTERFERES SOME ACTIVE ACTIVITIES AND ADLS
PAIN_FUNCTIONAL_ASSESSMENT: ACTIVITIES ARE NOT PREVENTED

## 2024-11-14 ASSESSMENT — PAIN DESCRIPTION - ONSET: ONSET: ON-GOING

## 2024-11-14 ASSESSMENT — PAIN DESCRIPTION - FREQUENCY: FREQUENCY: INTERMITTENT

## 2024-11-14 ASSESSMENT — PAIN DESCRIPTION - PAIN TYPE
TYPE: SURGICAL PAIN
TYPE: SURGICAL PAIN

## 2024-11-14 NOTE — ANESTHESIA POSTPROCEDURE EVALUATION
Department of Anesthesiology  Postprocedure Note    Patient: Sadia Gomez  MRN: [id number]  YOB: 1950  Date of evaluation: 11/14/2024    Procedure Summary       Date: 11/12/24 Room / Location: [facility] OR 06 / [facility] OR    Anesthesia Start: 1433 Anesthesia Stop: 1817    Procedure: Thoracic 12-Lumbar 2 Decompression and Fusion Extension to Thoraci 10 (Back) Diagnosis:       Degeneration of intervertebral disc of lumbar region, unspecified whether pain present      (Degeneration of intervertebral disc of lumbar region, unspecified whether pain present [M51.369])    Surgeons: Micah Dickinson MD Responsible Provider: Alek Gamino MD    Anesthesia Type: general ASA Status: 2            Anesthesia Type: No value filed.    Rajeev Phase I: Rajeev Score: 9    Rajeev Phase II:      Anesthesia Post Evaluation    No notable events documented.

## 2024-11-14 NOTE — CARE COORDINATION
11/14/24, 1:08 PM EST    DISCHARGE PLANNING EVALUATION    [facility] cannot accept for HH, patient may consider snf depending on progress with PT/OT.

## 2024-11-15 LAB
ANION GAP SERPL CALC-SCNC: 15 MEQ/L (ref 8–16)
BUN SERPL-MCNC: 14 MG/DL (ref 7–22)
CALCIUM SERPL-MCNC: 8.8 MG/DL (ref 8.5–10.5)
CHLORIDE SERPL-SCNC: 105 MEQ/L (ref 98–111)
CO2 SERPL-SCNC: 22 MEQ/L (ref 23–33)
CREAT SERPL-MCNC: 0.7 MG/DL (ref 0.4–1.2)
GFR SERPL CREATININE-BSD FRML MDRD: 89 ML/MIN/1.73M2
GLUCOSE BLD STRIP.AUTO-MCNC: 146 MG/DL (ref 70–108)
GLUCOSE BLD STRIP.AUTO-MCNC: 153 MG/DL (ref 70–108)
GLUCOSE BLD STRIP.AUTO-MCNC: 217 MG/DL (ref 70–108)
GLUCOSE BLD STRIP.AUTO-MCNC: 246 MG/DL (ref 70–108)
GLUCOSE SERPL-MCNC: 149 MG/DL (ref 70–108)
HCT VFR BLD AUTO: 32.6 % (ref 37–47)
HGB BLD-MCNC: 10.9 GM/DL (ref 12–16)
POTASSIUM SERPL-SCNC: 3.6 MEQ/L (ref 3.5–5.2)
SODIUM SERPL-SCNC: 142 MEQ/L (ref 135–145)

## 2024-11-15 PROCEDURE — 80048 BASIC METABOLIC PNL TOTAL CA: CPT

## 2024-11-15 PROCEDURE — 97530 THERAPEUTIC ACTIVITIES: CPT

## 2024-11-15 PROCEDURE — 97116 GAIT TRAINING THERAPY: CPT

## 2024-11-15 PROCEDURE — 97535 SELF CARE MNGMENT TRAINING: CPT

## 2024-11-15 PROCEDURE — 85014 HEMATOCRIT: CPT

## 2024-11-15 PROCEDURE — 82948 REAGENT STRIP/BLOOD GLUCOSE: CPT

## 2024-11-15 PROCEDURE — 6370000000 HC RX 637 (ALT 250 FOR IP): Performed by: INTERNAL MEDICINE

## 2024-11-15 PROCEDURE — 1200000000 HC SEMI PRIVATE

## 2024-11-15 PROCEDURE — 36415 COLL VENOUS BLD VENIPUNCTURE: CPT

## 2024-11-15 PROCEDURE — 99222 1ST HOSP IP/OBS MODERATE 55: CPT | Performed by: STUDENT IN AN ORGANIZED HEALTH CARE EDUCATION/TRAINING PROGRAM

## 2024-11-15 PROCEDURE — 6370000000 HC RX 637 (ALT 250 FOR IP): Performed by: PHYSICIAN ASSISTANT

## 2024-11-15 PROCEDURE — 85018 HEMOGLOBIN: CPT

## 2024-11-15 RX ADMIN — MICONAZOLE NITRATE: 2 POWDER TOPICAL at 21:13

## 2024-11-15 RX ADMIN — MICONAZOLE NITRATE: 2 POWDER TOPICAL at 08:58

## 2024-11-15 RX ADMIN — ALOGLIPTIN 12.5 MG: 12.5 TABLET, FILM COATED ORAL at 08:58

## 2024-11-15 RX ADMIN — POLYETHYLENE GLYCOL 3350 17 G: 17 POWDER, FOR SOLUTION ORAL at 08:58

## 2024-11-15 RX ADMIN — METFORMIN HYDROCHLORIDE 1000 MG: 500 TABLET ORAL at 21:12

## 2024-11-15 RX ADMIN — LOSARTAN POTASSIUM 50 MG: 50 TABLET, FILM COATED ORAL at 08:57

## 2024-11-15 RX ADMIN — METFORMIN HYDROCHLORIDE 1000 MG: 500 TABLET ORAL at 08:57

## 2024-11-15 RX ADMIN — EMPAGLIFLOZIN 10 MG: 10 TABLET, FILM COATED ORAL at 08:57

## 2024-11-15 RX ADMIN — AMLODIPINE BESYLATE 10 MG: 10 TABLET ORAL at 08:58

## 2024-11-15 RX ADMIN — BISACODYL 5 MG: 5 TABLET, COATED ORAL at 08:58

## 2024-11-15 RX ADMIN — OXYCODONE 10 MG: 5 TABLET ORAL at 08:58

## 2024-11-15 RX ADMIN — CYCLOBENZAPRINE 10 MG: 10 TABLET, FILM COATED ORAL at 03:22

## 2024-11-15 RX ADMIN — PRAVASTATIN SODIUM 20 MG: 20 TABLET ORAL at 08:58

## 2024-11-15 RX ADMIN — GLIPIZIDE 10 MG: 10 TABLET ORAL at 06:55

## 2024-11-15 RX ADMIN — TRIAMTERENE AND HYDROCHLOROTHIAZIDE 1 TABLET: 37.5; 25 TABLET ORAL at 08:58

## 2024-11-15 RX ADMIN — SENNOSIDES AND DOCUSATE SODIUM 1 TABLET: 50; 8.6 TABLET ORAL at 21:12

## 2024-11-15 RX ADMIN — SENNOSIDES AND DOCUSATE SODIUM 1 TABLET: 50; 8.6 TABLET ORAL at 08:58

## 2024-11-15 RX ADMIN — INSULIN LISPRO 4 UNITS: 100 INJECTION, SOLUTION INTRAVENOUS; SUBCUTANEOUS at 11:51

## 2024-11-15 RX ADMIN — INSULIN LISPRO 4 UNITS: 100 INJECTION, SOLUTION INTRAVENOUS; SUBCUTANEOUS at 21:12

## 2024-11-15 ASSESSMENT — PAIN DESCRIPTION - LOCATION
LOCATION: GENERALIZED
LOCATION: BACK

## 2024-11-15 ASSESSMENT — PAIN DESCRIPTION - PAIN TYPE
TYPE: SURGICAL PAIN

## 2024-11-15 ASSESSMENT — PAIN DESCRIPTION - ORIENTATION
ORIENTATION: MID

## 2024-11-15 ASSESSMENT — PAIN DESCRIPTION - DESCRIPTORS
DESCRIPTORS: ACHING

## 2024-11-15 ASSESSMENT — PAIN SCALES - GENERAL
PAINLEVEL_OUTOF10: 9
PAINLEVEL_OUTOF10: 7
PAINLEVEL_OUTOF10: 10
PAINLEVEL_OUTOF10: 5

## 2024-11-15 ASSESSMENT — PAIN - FUNCTIONAL ASSESSMENT
PAIN_FUNCTIONAL_ASSESSMENT: ACTIVITIES ARE NOT PREVENTED

## 2024-11-15 NOTE — CARE COORDINATION
11/15/24, 2:49 PM EST    DISCHARGE ON GOING EVALUATION    [facility] day: 1  Location: -10/010-A Reason for admit: Degeneration of intervertebral disc of lumbar region, unspecified whether pain present [M51.369]  Lumbar stenosis with neurogenic claudication [M48.062]     Procedures:   11/12  L2-S1 exploration of fusion  T10-L2 bilateral laminectomy, partial medial facetectomies and foraminotomies of T10, T11, T12, L1, L2, nerve roots   T10-S1 posterior spinal fusion extension using hang connectors  T11-L1 posterior spinal instrumentation, Cortera, Xtant instrumentation; retained instrumentation L2-S1 with  mL.    Imaging since last note: none    Barriers to Discharge: POD 3. IM and ortho following. PT/OT. TSLO. Pain control. Unable to ambulate without assist at this time. Patient and family have not agreed to rehab or snf at this time. Rehab plans to f/u next week.     PCP: Srini Pisano MD  Readmission Risk Score: 8.3    Patient Goals/Plan/Treatment Preferences: IPR vs SNF if patient agrees.

## 2024-11-15 NOTE — CONSULTS
patient's chart/provider notes, obtaining history from patient, performing physical exam, and creating treatment plan      Cass Dennis DO

## 2024-11-16 LAB
ANION GAP SERPL CALC-SCNC: 12 MEQ/L (ref 8–16)
BUN SERPL-MCNC: 16 MG/DL (ref 7–22)
CALCIUM SERPL-MCNC: 8.9 MG/DL (ref 8.5–10.5)
CHLORIDE SERPL-SCNC: 104 MEQ/L (ref 98–111)
CO2 SERPL-SCNC: 25 MEQ/L (ref 23–33)
CREAT SERPL-MCNC: 0.6 MG/DL (ref 0.4–1.2)
GFR SERPL CREATININE-BSD FRML MDRD: > 90 ML/MIN/1.73M2
GLUCOSE BLD STRIP.AUTO-MCNC: 162 MG/DL (ref 70–108)
GLUCOSE BLD STRIP.AUTO-MCNC: 163 MG/DL (ref 70–108)
GLUCOSE BLD STRIP.AUTO-MCNC: 175 MG/DL (ref 70–108)
GLUCOSE BLD STRIP.AUTO-MCNC: 178 MG/DL (ref 70–108)
GLUCOSE SERPL-MCNC: 162 MG/DL (ref 70–108)
HCT VFR BLD AUTO: 30.2 % (ref 37–47)
HGB BLD-MCNC: 9.9 GM/DL (ref 12–16)
POTASSIUM SERPL-SCNC: 3.5 MEQ/L (ref 3.5–5.2)
SODIUM SERPL-SCNC: 141 MEQ/L (ref 135–145)

## 2024-11-16 PROCEDURE — 1200000000 HC SEMI PRIVATE

## 2024-11-16 PROCEDURE — 36415 COLL VENOUS BLD VENIPUNCTURE: CPT

## 2024-11-16 PROCEDURE — 6370000000 HC RX 637 (ALT 250 FOR IP): Performed by: INTERNAL MEDICINE

## 2024-11-16 PROCEDURE — 82948 REAGENT STRIP/BLOOD GLUCOSE: CPT

## 2024-11-16 PROCEDURE — 85014 HEMATOCRIT: CPT

## 2024-11-16 PROCEDURE — 6370000000 HC RX 637 (ALT 250 FOR IP): Performed by: PHYSICIAN ASSISTANT

## 2024-11-16 PROCEDURE — 97110 THERAPEUTIC EXERCISES: CPT

## 2024-11-16 PROCEDURE — 80048 BASIC METABOLIC PNL TOTAL CA: CPT

## 2024-11-16 PROCEDURE — 85018 HEMOGLOBIN: CPT

## 2024-11-16 PROCEDURE — 97530 THERAPEUTIC ACTIVITIES: CPT

## 2024-11-16 RX ORDER — OXYCODONE HYDROCHLORIDE 5 MG/1
5 TABLET ORAL EVERY 6 HOURS PRN
Status: DISCONTINUED | OUTPATIENT
Start: 2024-11-16 | End: 2024-11-16

## 2024-11-16 RX ORDER — OXYCODONE HYDROCHLORIDE 5 MG/1
10 TABLET ORAL EVERY 4 HOURS PRN
Status: DISCONTINUED | OUTPATIENT
Start: 2024-11-16 | End: 2024-11-16

## 2024-11-16 RX ORDER — OXYCODONE HYDROCHLORIDE 5 MG/1
5 TABLET ORAL EVERY 4 HOURS PRN
Status: DISCONTINUED | OUTPATIENT
Start: 2024-11-16 | End: 2024-11-18 | Stop reason: HOSPADM

## 2024-11-16 RX ORDER — OXYCODONE HYDROCHLORIDE 5 MG/1
10 TABLET ORAL EVERY 4 HOURS PRN
Status: DISCONTINUED | OUTPATIENT
Start: 2024-11-16 | End: 2024-11-18 | Stop reason: HOSPADM

## 2024-11-16 RX ADMIN — SENNOSIDES AND DOCUSATE SODIUM 1 TABLET: 50; 8.6 TABLET ORAL at 09:59

## 2024-11-16 RX ADMIN — AMLODIPINE BESYLATE 10 MG: 10 TABLET ORAL at 09:59

## 2024-11-16 RX ADMIN — BISACODYL 5 MG: 5 TABLET, COATED ORAL at 09:59

## 2024-11-16 RX ADMIN — METFORMIN HYDROCHLORIDE 1000 MG: 500 TABLET ORAL at 20:12

## 2024-11-16 RX ADMIN — ALOGLIPTIN 12.5 MG: 12.5 TABLET, FILM COATED ORAL at 09:59

## 2024-11-16 RX ADMIN — MICONAZOLE NITRATE: 2 POWDER TOPICAL at 10:00

## 2024-11-16 RX ADMIN — CYCLOBENZAPRINE 10 MG: 10 TABLET, FILM COATED ORAL at 12:38

## 2024-11-16 RX ADMIN — CYCLOBENZAPRINE 10 MG: 10 TABLET, FILM COATED ORAL at 23:33

## 2024-11-16 RX ADMIN — TRIAMTERENE AND HYDROCHLOROTHIAZIDE 1 TABLET: 37.5; 25 TABLET ORAL at 09:59

## 2024-11-16 RX ADMIN — MICONAZOLE NITRATE: 2 POWDER TOPICAL at 20:12

## 2024-11-16 RX ADMIN — PRAVASTATIN SODIUM 20 MG: 20 TABLET ORAL at 09:59

## 2024-11-16 RX ADMIN — METFORMIN HYDROCHLORIDE 1000 MG: 500 TABLET ORAL at 09:59

## 2024-11-16 RX ADMIN — EMPAGLIFLOZIN 10 MG: 10 TABLET, FILM COATED ORAL at 10:00

## 2024-11-16 RX ADMIN — SENNOSIDES AND DOCUSATE SODIUM 1 TABLET: 50; 8.6 TABLET ORAL at 20:12

## 2024-11-16 RX ADMIN — OXYCODONE 10 MG: 5 TABLET ORAL at 20:12

## 2024-11-16 RX ADMIN — ACETAMINOPHEN 650 MG: 325 TABLET ORAL at 12:38

## 2024-11-16 RX ADMIN — POLYETHYLENE GLYCOL 3350 17 G: 17 POWDER, FOR SOLUTION ORAL at 10:00

## 2024-11-16 RX ADMIN — GLIPIZIDE 10 MG: 10 TABLET ORAL at 05:40

## 2024-11-16 RX ADMIN — ACETAMINOPHEN 650 MG: 325 TABLET ORAL at 23:34

## 2024-11-16 RX ADMIN — LOSARTAN POTASSIUM 50 MG: 50 TABLET, FILM COATED ORAL at 09:59

## 2024-11-16 RX ADMIN — OXYCODONE 10 MG: 5 TABLET ORAL at 10:00

## 2024-11-16 ASSESSMENT — PAIN DESCRIPTION - DESCRIPTORS
DESCRIPTORS: ACHING

## 2024-11-16 ASSESSMENT — PAIN DESCRIPTION - ORIENTATION
ORIENTATION: POSTERIOR;LOWER
ORIENTATION: LOWER
ORIENTATION: LOWER

## 2024-11-16 ASSESSMENT — PAIN DESCRIPTION - LOCATION
LOCATION: BACK

## 2024-11-16 ASSESSMENT — PAIN DESCRIPTION - PAIN TYPE: TYPE: SURGICAL PAIN

## 2024-11-16 ASSESSMENT — PAIN SCALES - GENERAL
PAINLEVEL_OUTOF10: 5
PAINLEVEL_OUTOF10: 9
PAINLEVEL_OUTOF10: 0
PAINLEVEL_OUTOF10: 7
PAINLEVEL_OUTOF10: 8

## 2024-11-16 ASSESSMENT — PAIN - FUNCTIONAL ASSESSMENT: PAIN_FUNCTIONAL_ASSESSMENT: ACTIVITIES ARE NOT PREVENTED

## 2024-11-17 LAB
ANION GAP SERPL CALC-SCNC: 11 MEQ/L (ref 8–16)
BUN SERPL-MCNC: 26 MG/DL (ref 7–22)
CALCIUM SERPL-MCNC: 8.6 MG/DL (ref 8.5–10.5)
CHLORIDE SERPL-SCNC: 101 MEQ/L (ref 98–111)
CO2 SERPL-SCNC: 26 MEQ/L (ref 23–33)
CREAT SERPL-MCNC: 0.7 MG/DL (ref 0.4–1.2)
GFR SERPL CREATININE-BSD FRML MDRD: 89 ML/MIN/1.73M2
GLUCOSE BLD STRIP.AUTO-MCNC: 133 MG/DL (ref 70–108)
GLUCOSE BLD STRIP.AUTO-MCNC: 149 MG/DL (ref 70–108)
GLUCOSE BLD STRIP.AUTO-MCNC: 150 MG/DL (ref 70–108)
GLUCOSE BLD STRIP.AUTO-MCNC: 90 MG/DL (ref 70–108)
GLUCOSE SERPL-MCNC: 130 MG/DL (ref 70–108)
HCT VFR BLD AUTO: 28.2 % (ref 37–47)
HGB BLD-MCNC: 9.1 GM/DL (ref 12–16)
POTASSIUM SERPL-SCNC: 3.4 MEQ/L (ref 3.5–5.2)
SODIUM SERPL-SCNC: 138 MEQ/L (ref 135–145)

## 2024-11-17 PROCEDURE — 1200000000 HC SEMI PRIVATE

## 2024-11-17 PROCEDURE — 97535 SELF CARE MNGMENT TRAINING: CPT

## 2024-11-17 PROCEDURE — 6370000000 HC RX 637 (ALT 250 FOR IP): Performed by: INTERNAL MEDICINE

## 2024-11-17 PROCEDURE — 82948 REAGENT STRIP/BLOOD GLUCOSE: CPT

## 2024-11-17 PROCEDURE — 85014 HEMATOCRIT: CPT

## 2024-11-17 PROCEDURE — 97530 THERAPEUTIC ACTIVITIES: CPT

## 2024-11-17 PROCEDURE — 36415 COLL VENOUS BLD VENIPUNCTURE: CPT

## 2024-11-17 PROCEDURE — 80048 BASIC METABOLIC PNL TOTAL CA: CPT

## 2024-11-17 PROCEDURE — 85018 HEMOGLOBIN: CPT

## 2024-11-17 PROCEDURE — 6370000000 HC RX 637 (ALT 250 FOR IP): Performed by: PHYSICIAN ASSISTANT

## 2024-11-17 RX ORDER — CYCLOBENZAPRINE HCL 10 MG
5 TABLET ORAL 3 TIMES DAILY PRN
Status: DISCONTINUED | OUTPATIENT
Start: 2024-11-17 | End: 2024-11-18 | Stop reason: HOSPADM

## 2024-11-17 RX ORDER — BISACODYL 5 MG/1
10 TABLET, DELAYED RELEASE ORAL 2 TIMES DAILY
Status: DISCONTINUED | OUTPATIENT
Start: 2024-11-17 | End: 2024-11-18 | Stop reason: HOSPADM

## 2024-11-17 RX ORDER — SENNA AND DOCUSATE SODIUM 50; 8.6 MG/1; MG/1
2 TABLET, FILM COATED ORAL 2 TIMES DAILY
Status: DISCONTINUED | OUTPATIENT
Start: 2024-11-17 | End: 2024-11-18 | Stop reason: HOSPADM

## 2024-11-17 RX ADMIN — METFORMIN HYDROCHLORIDE 1000 MG: 500 TABLET ORAL at 10:45

## 2024-11-17 RX ADMIN — POLYETHYLENE GLYCOL 3350 17 G: 17 POWDER, FOR SOLUTION ORAL at 10:45

## 2024-11-17 RX ADMIN — GLIPIZIDE 10 MG: 10 TABLET ORAL at 05:38

## 2024-11-17 RX ADMIN — SENNOSIDES AND DOCUSATE SODIUM 2 TABLET: 50; 8.6 TABLET ORAL at 20:24

## 2024-11-17 RX ADMIN — Medication 12.5 MG: at 16:43

## 2024-11-17 RX ADMIN — MICONAZOLE NITRATE: 2 POWDER TOPICAL at 10:45

## 2024-11-17 RX ADMIN — BISACODYL 5 MG: 5 TABLET, COATED ORAL at 10:45

## 2024-11-17 RX ADMIN — BISACODYL 10 MG: 5 TABLET, COATED ORAL at 16:43

## 2024-11-17 RX ADMIN — ACETAMINOPHEN 650 MG: 325 TABLET ORAL at 20:24

## 2024-11-17 RX ADMIN — OXYCODONE 10 MG: 5 TABLET ORAL at 16:43

## 2024-11-17 RX ADMIN — ACETAMINOPHEN 650 MG: 325 TABLET ORAL at 14:15

## 2024-11-17 RX ADMIN — OXYCODONE 10 MG: 5 TABLET ORAL at 21:00

## 2024-11-17 RX ADMIN — METFORMIN HYDROCHLORIDE 1000 MG: 500 TABLET ORAL at 20:22

## 2024-11-17 RX ADMIN — ALOGLIPTIN 12.5 MG: 12.5 TABLET, FILM COATED ORAL at 10:45

## 2024-11-17 RX ADMIN — ACETAMINOPHEN 650 MG: 325 TABLET ORAL at 05:38

## 2024-11-17 RX ADMIN — MICONAZOLE NITRATE: 2 POWDER TOPICAL at 20:22

## 2024-11-17 RX ADMIN — SENNOSIDES AND DOCUSATE SODIUM 1 TABLET: 50; 8.6 TABLET ORAL at 10:45

## 2024-11-17 RX ADMIN — EMPAGLIFLOZIN 10 MG: 10 TABLET, FILM COATED ORAL at 10:45

## 2024-11-17 RX ADMIN — PRAVASTATIN SODIUM 20 MG: 20 TABLET ORAL at 10:45

## 2024-11-17 RX ADMIN — OXYCODONE 10 MG: 5 TABLET ORAL at 10:45

## 2024-11-17 RX ADMIN — OXYCODONE 10 MG: 5 TABLET ORAL at 04:01

## 2024-11-17 ASSESSMENT — PAIN DESCRIPTION - ORIENTATION
ORIENTATION: LOWER;POSTERIOR;MID
ORIENTATION: LOWER
ORIENTATION: LOWER

## 2024-11-17 ASSESSMENT — PAIN SCALES - GENERAL
PAINLEVEL_OUTOF10: 7
PAINLEVEL_OUTOF10: 0
PAINLEVEL_OUTOF10: 7
PAINLEVEL_OUTOF10: 10
PAINLEVEL_OUTOF10: 7
PAINLEVEL_OUTOF10: 5
PAINLEVEL_OUTOF10: 10
PAINLEVEL_OUTOF10: 0
PAINLEVEL_OUTOF10: 6
PAINLEVEL_OUTOF10: 0
PAINLEVEL_OUTOF10: 0

## 2024-11-17 ASSESSMENT — PAIN - FUNCTIONAL ASSESSMENT: PAIN_FUNCTIONAL_ASSESSMENT: ACTIVITIES ARE NOT PREVENTED

## 2024-11-17 ASSESSMENT — PAIN DESCRIPTION - DESCRIPTORS
DESCRIPTORS: ACHING

## 2024-11-17 ASSESSMENT — PAIN DESCRIPTION - LOCATION
LOCATION: BACK

## 2024-11-17 NOTE — PLAN OF CARE
Problem: Chronic Conditions and Co-morbidities  Goal: Patient's chronic conditions and co-morbidity symptoms are monitored and maintained or improved  11/15/2024 2238 by Dannielle Barcenas RN  Outcome: Progressing  Flowsheets (Taken 11/15/2024 2238)  Care Plan - Patient's Chronic Conditions and Co-Morbidity Symptoms are Monitored and Maintained or Improved:   Monitor and assess patient's chronic conditions and comorbid symptoms for stability, deterioration, or improvement   Collaborate with multidisciplinary team to address chronic and comorbid conditions and prevent exacerbation or deterioration   Update acute care plan with appropriate goals if chronic or comorbid symptoms are exacerbated and prevent overall improvement and discharge     Problem: Discharge Planning  Goal: Discharge to home or other facility with appropriate resources  11/15/2024 2238 by Dannielle Barcenas RN  Outcome: Progressing  Flowsheets (Taken 11/15/2024 2238)  Discharge to home or other facility with appropriate resources:   Identify barriers to discharge with patient and caregiver   Arrange for needed discharge resources and transportation as appropriate   Identify discharge learning needs (meds, wound care, etc)     Problem: Pain  Goal: Verbalizes/displays adequate comfort level or baseline comfort level  11/15/2024 2238 by Dannielle Barcenas RN  Outcome: Progressing  Flowsheets (Taken 11/15/2024 2238)  Verbalizes/displays adequate comfort level or baseline comfort level:   Encourage patient to monitor pain and request assistance   Assess pain using appropriate pain scale   Administer analgesics based on type and severity of pain and evaluate response   Implement non-pharmacological measures as appropriate and evaluate response     Problem: Safety - Adult  Goal: Free from fall injury  11/15/2024 2238 by Dannielle Barcenas RN  Outcome: Progressing  Flowsheets (Taken 11/15/2024 2238)  Free From Fall Injury: Instruct family/caregiver on patient 
  Problem: Chronic Conditions and Co-morbidities  Goal: Patient's chronic conditions and co-morbidity symptoms are monitored and maintained or improved  11/16/2024 1212 by Marcie Stevenson RN  Outcome: Progressing  Flowsheets (Taken 11/15/2024 2238 by Dannielle Barcenas RN)  Care Plan - Patient's Chronic Conditions and Co-Morbidity Symptoms are Monitored and Maintained or Improved:   Monitor and assess patient's chronic conditions and comorbid symptoms for stability, deterioration, or improvement   Collaborate with multidisciplinary team to address chronic and comorbid conditions and prevent exacerbation or deterioration   Update acute care plan with appropriate goals if chronic or comorbid symptoms are exacerbated and prevent overall improvement and discharge     Problem: Discharge Planning  Goal: Discharge to home or other facility with appropriate resources  11/16/2024 1212 by Marcie Stevenson RN  Outcome: Progressing  Flowsheets (Taken 11/15/2024 2238 by Dannielle Barcenas RN)  Discharge to home or other facility with appropriate resources:   Identify barriers to discharge with patient and caregiver   Arrange for needed discharge resources and transportation as appropriate   Identify discharge learning needs (meds, wound care, etc)     Problem: Pain  Goal: Verbalizes/displays adequate comfort level or baseline comfort level  11/16/2024 1212 by Marcie Stevenson RN  Outcome: Progressing  Flowsheets (Taken 11/15/2024 2238 by Dannielle Barcenas RN)  Verbalizes/displays adequate comfort level or baseline comfort level:   Encourage patient to monitor pain and request assistance   Assess pain using appropriate pain scale   Administer analgesics based on type and severity of pain and evaluate response   Implement non-pharmacological measures as appropriate and evaluate response     Problem: Safety - Adult  Goal: Free from fall injury  11/16/2024 1212 by Marcie Stevenson RN  Outcome: Progressing  Flowsheets (Taken 
  Problem: Chronic Conditions and Co-morbidities  Goal: Patient's chronic conditions and co-morbidity symptoms are monitored and maintained or improved  11/17/2024 0059 by Lupe Hines RN  Outcome: Progressing     Problem: Discharge Planning  Goal: Discharge to home or other facility with appropriate resources  11/17/2024 0059 by Lupe Hines RN  Outcome: Progressing     Problem: Pain  Goal: Verbalizes/displays adequate comfort level or baseline comfort level  11/17/2024 0059 by Lupe Hines RN  Outcome: Progressing     Problem: Safety - Adult  Goal: Free from fall injury  11/17/2024 0059 by Lupe Hines RN  Outcome: Progressing     Problem: ABCDS Injury Assessment  Goal: Absence of physical injury  11/17/2024 0059 by Lupe Hines RN  Outcome: Progressing     Problem: Cardiovascular - Adult  Goal: Maintains optimal cardiac output and hemodynamic stability  11/17/2024 0059 by Lupe Hines RN  Outcome: Progressing     Problem: Cardiovascular - Adult  Goal: Absence of cardiac dysrhythmias or at baseline  11/17/2024 0059 by Lupe Hines RN  Outcome: Progressing     Problem: Skin/Tissue Integrity  Goal: Absence of new skin breakdown  Description: 1.  Monitor for areas of redness and/or skin breakdown  2.  Assess vascular access sites hourly  3.  Every 4-6 hours minimum:  Change oxygen saturation probe site  4.  Every 4-6 hours:  If on nasal continuous positive airway pressure, respiratory therapy assess nares and determine need for appliance change or resting period.  11/17/2024 0059 by Lupe Hines RN  Outcome: Progressing     
  Problem: Chronic Conditions and Co-morbidities  Goal: Patient's chronic conditions and co-morbidity symptoms are monitored and maintained or improved  11/17/2024 0907 by Marcie Stevenson RN  Outcome: Progressing  Flowsheets (Taken 11/15/2024 2238 by Dannielle Barcenas RN)  Care Plan - Patient's Chronic Conditions and Co-Morbidity Symptoms are Monitored and Maintained or Improved:   Monitor and assess patient's chronic conditions and comorbid symptoms for stability, deterioration, or improvement   Collaborate with multidisciplinary team to address chronic and comorbid conditions and prevent exacerbation or deterioration   Update acute care plan with appropriate goals if chronic or comorbid symptoms are exacerbated and prevent overall improvement and discharge     Problem: Discharge Planning  Goal: Discharge to home or other facility with appropriate resources  11/17/2024 0907 by Marcie Stevenson RN  Outcome: Progressing  Flowsheets (Taken 11/15/2024 2238 by Dannielle Barcenas RN)  Discharge to home or other facility with appropriate resources:   Identify barriers to discharge with patient and caregiver   Arrange for needed discharge resources and transportation as appropriate   Identify discharge learning needs (meds, wound care, etc)     Problem: Pain  Goal: Verbalizes/displays adequate comfort level or baseline comfort level  11/17/2024 0907 by Marcie Stevenson RN  Outcome: Progressing  Flowsheets (Taken 11/15/2024 2238 by Dannielle Barcenas RN)  Verbalizes/displays adequate comfort level or baseline comfort level:   Encourage patient to monitor pain and request assistance   Assess pain using appropriate pain scale   Administer analgesics based on type and severity of pain and evaluate response   Implement non-pharmacological measures as appropriate and evaluate response     Problem: Safety - Adult  Goal: Free from fall injury  11/17/2024 0907 by Marcie Stevenson, RN  Outcome: Progressing  Flowsheets (Taken 
  Problem: Chronic Conditions and Co-morbidities  Goal: Patient's chronic conditions and co-morbidity symptoms are monitored and maintained or improved  Outcome: Progressing  Flowsheets (Taken 11/15/2024 1733)  Care Plan - Patient's Chronic Conditions and Co-Morbidity Symptoms are Monitored and Maintained or Improved: Monitor and assess patient's chronic conditions and comorbid symptoms for stability, deterioration, or improvement     Problem: Discharge Planning  Goal: Discharge to home or other facility with appropriate resources  Outcome: Progressing  Flowsheets (Taken 11/15/2024 1733)  Discharge to home or other facility with appropriate resources: Identify barriers to discharge with patient and caregiver     Problem: Pain  Goal: Verbalizes/displays adequate comfort level or baseline comfort level  Outcome: Progressing  Flowsheets (Taken 11/15/2024 1733)  Verbalizes/displays adequate comfort level or baseline comfort level: Encourage patient to monitor pain and request assistance     Problem: Safety - Adult  Goal: Free from fall injury  Outcome: Progressing  Flowsheets (Taken 11/15/2024 1733)  Free From Fall Injury: Instruct family/caregiver on patient safety     Problem: ABCDS Injury Assessment  Goal: Absence of physical injury  Outcome: Progressing  Flowsheets (Taken 11/15/2024 1733)  Absence of Physical Injury: Implement safety measures based on patient assessment     Problem: Cardiovascular - Adult  Goal: Maintains optimal cardiac output and hemodynamic stability  Outcome: Progressing  Flowsheets (Taken 11/15/2024 1733)  Maintains optimal cardiac output and hemodynamic stability: Monitor blood pressure and heart rate     Problem: Cardiovascular - Adult  Goal: Absence of cardiac dysrhythmias or at baseline  Outcome: Progressing     Problem: Skin/Tissue Integrity  Goal: Absence of new skin breakdown  Description: 1.  Monitor for areas of redness and/or skin breakdown  2.  Assess vascular access sites hourly  3. 
  Problem: Discharge Planning  Goal: Discharge to home or other facility with appropriate resources  11/13/2024 1106 by Tere Haskins LSW  Outcome: Progressing       Consult received. Please see SW note dated 11/13.   
Adult  Goal: Maintains optimal cardiac output and hemodynamic stability  Outcome: Progressing  Flowsheets (Taken 11/14/2024 0403)  Maintains optimal cardiac output and hemodynamic stability:   Monitor blood pressure and heart rate   Monitor urine output and notify Licensed Independent Practitioner for values outside of normal range   Assess for signs of decreased cardiac output     Problem: Cardiovascular - Adult  Goal: Absence of cardiac dysrhythmias or at baseline  Outcome: Progressing  Flowsheets (Taken 11/14/2024 0403)  Absence of cardiac dysrhythmias or at baseline:   Monitor cardiac rate and rhythm   Assess for signs of decreased cardiac output     Problem: Genitourinary - Adult  Goal: Urinary catheter remains patent  Outcome: Progressing     Problem: Skin/Tissue Integrity  Goal: Absence of new skin breakdown  Description: 1.  Monitor for areas of redness and/or skin breakdown  2.  Assess vascular access sites hourly  3.  Every 4-6 hours minimum:  Change oxygen saturation probe site  4.  Every 4-6 hours:  If on nasal continuous positive airway pressure, respiratory therapy assess nares and determine need for appliance change or resting period.  Outcome: Progressing     
Physical Injury: Implement safety measures based on patient assessment     Problem: Cardiovascular - Adult  Goal: Maintains optimal cardiac output and hemodynamic stability  Outcome: Progressing  Flowsheets (Taken 11/13/2024 0139)  Maintains optimal cardiac output and hemodynamic stability:   Monitor blood pressure and heart rate   Assess for signs of decreased cardiac output     Problem: Cardiovascular - Adult  Goal: Absence of cardiac dysrhythmias or at baseline  Outcome: Progressing  Flowsheets (Taken 11/13/2024 0139)  Absence of cardiac dysrhythmias or at baseline:   Monitor cardiac rate and rhythm   Assess for signs of decreased cardiac output     Problem: Genitourinary - Adult  Goal: Urinary catheter remains patent  Outcome: Progressing  Flowsheets (Taken 11/13/2024 0139)  Urinary catheter remains patent: Assess patency of urinary catheter     Care plan reviewed with patient. Patient verbalizes understanding of the plan of care and contributes to goal setting.

## 2024-11-18 ENCOUNTER — HOSPITAL ENCOUNTER (INPATIENT)
Age: 74
LOS: 14 days | Discharge: NURSING, REHAB OR LONG-TERM CARE | DRG: 560 | End: 2024-12-02
Attending: STUDENT IN AN ORGANIZED HEALTH CARE EDUCATION/TRAINING PROGRAM | Admitting: STUDENT IN AN ORGANIZED HEALTH CARE EDUCATION/TRAINING PROGRAM
Payer: MEDICARE

## 2024-11-18 VITALS
RESPIRATION RATE: 19 BRPM | OXYGEN SATURATION: 94 % | BODY MASS INDEX: 29.95 KG/M2 | WEIGHT: 169 LBS | TEMPERATURE: 98 F | DIASTOLIC BLOOD PRESSURE: 60 MMHG | SYSTOLIC BLOOD PRESSURE: 131 MMHG | HEART RATE: 84 BPM | HEIGHT: 63 IN

## 2024-11-18 DIAGNOSIS — M54.16 SPINAL STENOSIS OF LUMBAR REGION WITH RADICULOPATHY: Primary | ICD-10-CM

## 2024-11-18 DIAGNOSIS — E87.1 HYPONATREMIA: ICD-10-CM

## 2024-11-18 DIAGNOSIS — M48.061 SPINAL STENOSIS OF LUMBAR REGION WITH RADICULOPATHY: Primary | ICD-10-CM

## 2024-11-18 LAB
ANION GAP SERPL CALC-SCNC: 12 MEQ/L (ref 8–16)
BASOPHILS ABSOLUTE: 0 THOU/MM3 (ref 0–0.1)
BASOPHILS NFR BLD AUTO: 0.4 %
BUN SERPL-MCNC: 24 MG/DL (ref 7–22)
CALCIUM SERPL-MCNC: 8.3 MG/DL (ref 8.5–10.5)
CHLORIDE SERPL-SCNC: 101 MEQ/L (ref 98–111)
CO2 SERPL-SCNC: 24 MEQ/L (ref 23–33)
CREAT SERPL-MCNC: 0.6 MG/DL (ref 0.4–1.2)
DEPRECATED RDW RBC AUTO: 45.8 FL (ref 35–45)
EOSINOPHIL NFR BLD AUTO: 3.1 %
EOSINOPHILS ABSOLUTE: 0.3 THOU/MM3 (ref 0–0.4)
ERYTHROCYTE [DISTWIDTH] IN BLOOD BY AUTOMATED COUNT: 12.9 % (ref 11.5–14.5)
GFR SERPL CREATININE-BSD FRML MDRD: > 90 ML/MIN/1.73M2
GLUCOSE BLD STRIP.AUTO-MCNC: 81 MG/DL (ref 70–108)
GLUCOSE BLD STRIP.AUTO-MCNC: 98 MG/DL (ref 70–108)
GLUCOSE SERPL-MCNC: 92 MG/DL (ref 70–108)
HCT VFR BLD AUTO: 28.2 % (ref 37–47)
HCT VFR BLD AUTO: 29.5 % (ref 37–47)
HGB BLD-MCNC: 9 GM/DL (ref 12–16)
HGB BLD-MCNC: 9.6 GM/DL (ref 12–16)
IMM GRANULOCYTES # BLD AUTO: 0.08 THOU/MM3 (ref 0–0.07)
IMM GRANULOCYTES NFR BLD AUTO: 0.8 %
LYMPHOCYTES ABSOLUTE: 0.9 THOU/MM3 (ref 1–4.8)
LYMPHOCYTES NFR BLD AUTO: 9.6 %
MCH RBC QN AUTO: 31.8 PG (ref 26–33)
MCHC RBC AUTO-ENTMCNC: 32.5 GM/DL (ref 32.2–35.5)
MCV RBC AUTO: 97.7 FL (ref 81–99)
MONOCYTES ABSOLUTE: 1.2 THOU/MM3 (ref 0.4–1.3)
MONOCYTES NFR BLD AUTO: 12.1 %
NEUTROPHILS ABSOLUTE: 7.1 THOU/MM3 (ref 1.8–7.7)
NEUTROPHILS NFR BLD AUTO: 74 %
NRBC BLD AUTO-RTO: 0 /100 WBC
PLATELET # BLD AUTO: 347 THOU/MM3 (ref 130–400)
PMV BLD AUTO: 10.3 FL (ref 9.4–12.4)
POTASSIUM SERPL-SCNC: 3.7 MEQ/L (ref 3.5–5.2)
RBC # BLD AUTO: 3.02 MILL/MM3 (ref 4.2–5.4)
SODIUM SERPL-SCNC: 137 MEQ/L (ref 135–145)
WBC # BLD AUTO: 9.6 THOU/MM3 (ref 4.8–10.8)

## 2024-11-18 PROCEDURE — 97116 GAIT TRAINING THERAPY: CPT

## 2024-11-18 PROCEDURE — 85018 HEMOGLOBIN: CPT

## 2024-11-18 PROCEDURE — 97530 THERAPEUTIC ACTIVITIES: CPT

## 2024-11-18 PROCEDURE — 6370000000 HC RX 637 (ALT 250 FOR IP): Performed by: PHYSICIAN ASSISTANT

## 2024-11-18 PROCEDURE — 6370000000 HC RX 637 (ALT 250 FOR IP): Performed by: INTERNAL MEDICINE

## 2024-11-18 PROCEDURE — 82948 REAGENT STRIP/BLOOD GLUCOSE: CPT

## 2024-11-18 PROCEDURE — 2500000003 HC RX 250 WO HCPCS: Performed by: STUDENT IN AN ORGANIZED HEALTH CARE EDUCATION/TRAINING PROGRAM

## 2024-11-18 PROCEDURE — 80048 BASIC METABOLIC PNL TOTAL CA: CPT

## 2024-11-18 PROCEDURE — 87077 CULTURE AEROBIC IDENTIFY: CPT

## 2024-11-18 PROCEDURE — 1180000000 HC REHAB R&B

## 2024-11-18 PROCEDURE — 81003 URINALYSIS AUTO W/O SCOPE: CPT

## 2024-11-18 PROCEDURE — 97535 SELF CARE MNGMENT TRAINING: CPT

## 2024-11-18 PROCEDURE — 87086 URINE CULTURE/COLONY COUNT: CPT

## 2024-11-18 PROCEDURE — 85025 COMPLETE CBC W/AUTO DIFF WBC: CPT

## 2024-11-18 PROCEDURE — 85014 HEMATOCRIT: CPT

## 2024-11-18 PROCEDURE — 6370000000 HC RX 637 (ALT 250 FOR IP): Performed by: STUDENT IN AN ORGANIZED HEALTH CARE EDUCATION/TRAINING PROGRAM

## 2024-11-18 PROCEDURE — 36415 COLL VENOUS BLD VENIPUNCTURE: CPT

## 2024-11-18 PROCEDURE — 99222 1ST HOSP IP/OBS MODERATE 55: CPT | Performed by: STUDENT IN AN ORGANIZED HEALTH CARE EDUCATION/TRAINING PROGRAM

## 2024-11-18 PROCEDURE — 97110 THERAPEUTIC EXERCISES: CPT

## 2024-11-18 RX ORDER — SODIUM CHLORIDE 0.9 % (FLUSH) 0.9 %
5-40 SYRINGE (ML) INJECTION EVERY 12 HOURS SCHEDULED
Status: DISCONTINUED | OUTPATIENT
Start: 2024-11-18 | End: 2024-11-19

## 2024-11-18 RX ORDER — INSULIN LISPRO 100 [IU]/ML
0-16 INJECTION, SOLUTION INTRAVENOUS; SUBCUTANEOUS
Status: CANCELLED | OUTPATIENT
Start: 2024-11-18

## 2024-11-18 RX ORDER — OXYCODONE HYDROCHLORIDE 5 MG/1
10 TABLET ORAL EVERY 4 HOURS PRN
Status: DISCONTINUED | OUTPATIENT
Start: 2024-11-18 | End: 2024-11-19

## 2024-11-18 RX ORDER — ALOGLIPTIN 12.5 MG/1
12.5 TABLET, FILM COATED ORAL DAILY
Status: DISCONTINUED | OUTPATIENT
Start: 2024-11-19 | End: 2024-12-02 | Stop reason: HOSPADM

## 2024-11-18 RX ORDER — LOSARTAN POTASSIUM 50 MG/1
50 TABLET ORAL DAILY
Status: CANCELLED | OUTPATIENT
Start: 2024-11-19

## 2024-11-18 RX ORDER — SENNA AND DOCUSATE SODIUM 50; 8.6 MG/1; MG/1
2 TABLET, FILM COATED ORAL 2 TIMES DAILY
Status: DISCONTINUED | OUTPATIENT
Start: 2024-11-18 | End: 2024-11-23

## 2024-11-18 RX ORDER — OXYCODONE HYDROCHLORIDE 5 MG/1
5 TABLET ORAL EVERY 4 HOURS PRN
Status: DISCONTINUED | OUTPATIENT
Start: 2024-11-18 | End: 2024-11-19

## 2024-11-18 RX ORDER — TRIAMTERENE AND HYDROCHLOROTHIAZIDE 37.5; 25 MG/1; MG/1
1 TABLET ORAL DAILY
Status: DISCONTINUED | OUTPATIENT
Start: 2024-11-19 | End: 2024-12-02 | Stop reason: HOSPADM

## 2024-11-18 RX ORDER — TRIAMTERENE AND HYDROCHLOROTHIAZIDE 37.5; 25 MG/1; MG/1
1 TABLET ORAL DAILY
Status: CANCELLED | OUTPATIENT
Start: 2024-11-19

## 2024-11-18 RX ORDER — INSULIN LISPRO 100 [IU]/ML
0-16 INJECTION, SOLUTION INTRAVENOUS; SUBCUTANEOUS
Status: DISCONTINUED | OUTPATIENT
Start: 2024-11-18 | End: 2024-11-18

## 2024-11-18 RX ORDER — POTASSIUM CHLORIDE 1500 MG/1
40 TABLET, EXTENDED RELEASE ORAL PRN
Status: DISCONTINUED | OUTPATIENT
Start: 2024-11-18 | End: 2024-12-02 | Stop reason: HOSPADM

## 2024-11-18 RX ORDER — BISACODYL 5 MG/1
10 TABLET, DELAYED RELEASE ORAL 2 TIMES DAILY
Status: CANCELLED | OUTPATIENT
Start: 2024-11-18

## 2024-11-18 RX ORDER — BISACODYL 10 MG
10 SUPPOSITORY, RECTAL RECTAL DAILY PRN
Status: DISCONTINUED | OUTPATIENT
Start: 2024-11-18 | End: 2024-12-02 | Stop reason: HOSPADM

## 2024-11-18 RX ORDER — SODIUM CHLORIDE 9 MG/ML
INJECTION, SOLUTION INTRAVENOUS PRN
Status: DISCONTINUED | OUTPATIENT
Start: 2024-11-18 | End: 2024-12-02 | Stop reason: HOSPADM

## 2024-11-18 RX ORDER — SODIUM CHLORIDE 0.9 % (FLUSH) 0.9 %
5-40 SYRINGE (ML) INJECTION PRN
Status: DISCONTINUED | OUTPATIENT
Start: 2024-11-18 | End: 2024-12-02 | Stop reason: HOSPADM

## 2024-11-18 RX ORDER — GLIPIZIDE 10 MG/1
10 TABLET ORAL
Status: DISCONTINUED | OUTPATIENT
Start: 2024-11-19 | End: 2024-12-02 | Stop reason: HOSPADM

## 2024-11-18 RX ORDER — ACETAMINOPHEN 325 MG/1
650 TABLET ORAL EVERY 6 HOURS PRN
Status: DISCONTINUED | OUTPATIENT
Start: 2024-11-18 | End: 2024-11-19

## 2024-11-18 RX ORDER — SENNA AND DOCUSATE SODIUM 50; 8.6 MG/1; MG/1
2 TABLET, FILM COATED ORAL 2 TIMES DAILY
Status: CANCELLED | OUTPATIENT
Start: 2024-11-18

## 2024-11-18 RX ORDER — OXYCODONE HYDROCHLORIDE 5 MG/1
5 TABLET ORAL EVERY 4 HOURS PRN
Status: CANCELLED | OUTPATIENT
Start: 2024-11-18

## 2024-11-18 RX ORDER — BISACODYL 5 MG/1
10 TABLET, DELAYED RELEASE ORAL 2 TIMES DAILY
Status: DISCONTINUED | OUTPATIENT
Start: 2024-11-18 | End: 2024-11-22

## 2024-11-18 RX ORDER — SODIUM CHLORIDE 9 MG/ML
INJECTION, SOLUTION INTRAVENOUS PRN
Status: CANCELLED | OUTPATIENT
Start: 2024-11-18

## 2024-11-18 RX ORDER — ONDANSETRON 4 MG/1
4 TABLET, ORALLY DISINTEGRATING ORAL EVERY 8 HOURS PRN
Status: DISCONTINUED | OUTPATIENT
Start: 2024-11-18 | End: 2024-12-02 | Stop reason: HOSPADM

## 2024-11-18 RX ORDER — SODIUM CHLORIDE 0.9 % (FLUSH) 0.9 %
5-40 SYRINGE (ML) INJECTION EVERY 12 HOURS SCHEDULED
Status: CANCELLED | OUTPATIENT
Start: 2024-11-18

## 2024-11-18 RX ORDER — PRAVASTATIN SODIUM 20 MG
20 TABLET ORAL DAILY
Status: DISCONTINUED | OUTPATIENT
Start: 2024-11-19 | End: 2024-12-02 | Stop reason: HOSPADM

## 2024-11-18 RX ORDER — ONDANSETRON 4 MG/1
4 TABLET, ORALLY DISINTEGRATING ORAL EVERY 8 HOURS PRN
Status: CANCELLED | OUTPATIENT
Start: 2024-11-18

## 2024-11-18 RX ORDER — POTASSIUM CHLORIDE 1500 MG/1
40 TABLET, EXTENDED RELEASE ORAL PRN
Status: CANCELLED | OUTPATIENT
Start: 2024-11-18

## 2024-11-18 RX ORDER — GLUCAGON 1 MG/ML
1 KIT INJECTION PRN
Status: DISCONTINUED | OUTPATIENT
Start: 2024-11-18 | End: 2024-12-02 | Stop reason: HOSPADM

## 2024-11-18 RX ORDER — POLYETHYLENE GLYCOL 3350 17 G/17G
17 POWDER, FOR SOLUTION ORAL DAILY
Status: CANCELLED | OUTPATIENT
Start: 2024-11-19

## 2024-11-18 RX ORDER — BISACODYL 10 MG
10 SUPPOSITORY, RECTAL RECTAL DAILY PRN
Status: CANCELLED | OUTPATIENT
Start: 2024-11-18

## 2024-11-18 RX ORDER — SODIUM CHLORIDE 0.9 % (FLUSH) 0.9 %
5-40 SYRINGE (ML) INJECTION PRN
Status: CANCELLED | OUTPATIENT
Start: 2024-11-18

## 2024-11-18 RX ORDER — ACETAMINOPHEN 325 MG/1
650 TABLET ORAL EVERY 6 HOURS PRN
Status: CANCELLED | OUTPATIENT
Start: 2024-11-18

## 2024-11-18 RX ORDER — LOSARTAN POTASSIUM 50 MG/1
50 TABLET ORAL DAILY
Status: DISCONTINUED | OUTPATIENT
Start: 2024-11-19 | End: 2024-12-02 | Stop reason: HOSPADM

## 2024-11-18 RX ORDER — AMLODIPINE BESYLATE 10 MG/1
10 TABLET ORAL DAILY
Status: CANCELLED | OUTPATIENT
Start: 2024-11-19

## 2024-11-18 RX ORDER — CYCLOBENZAPRINE HCL 10 MG
5 TABLET ORAL 3 TIMES DAILY PRN
Status: CANCELLED | OUTPATIENT
Start: 2024-11-18

## 2024-11-18 RX ORDER — OXYCODONE HYDROCHLORIDE 5 MG/1
10 TABLET ORAL EVERY 4 HOURS PRN
Status: CANCELLED | OUTPATIENT
Start: 2024-11-18

## 2024-11-18 RX ORDER — POLYETHYLENE GLYCOL 3350 17 G/17G
17 POWDER, FOR SOLUTION ORAL DAILY
Status: DISCONTINUED | OUTPATIENT
Start: 2024-11-19 | End: 2024-12-02 | Stop reason: HOSPADM

## 2024-11-18 RX ORDER — CYCLOBENZAPRINE HCL 10 MG
5 TABLET ORAL 3 TIMES DAILY PRN
Status: DISCONTINUED | OUTPATIENT
Start: 2024-11-18 | End: 2024-11-19

## 2024-11-18 RX ORDER — PRAVASTATIN SODIUM 20 MG
20 TABLET ORAL DAILY
Status: CANCELLED | OUTPATIENT
Start: 2024-11-19

## 2024-11-18 RX ORDER — AMLODIPINE BESYLATE 10 MG/1
10 TABLET ORAL DAILY
Status: DISCONTINUED | OUTPATIENT
Start: 2024-11-19 | End: 2024-12-02 | Stop reason: HOSPADM

## 2024-11-18 RX ORDER — DEXTROSE MONOHYDRATE 100 MG/ML
INJECTION, SOLUTION INTRAVENOUS CONTINUOUS PRN
Status: DISCONTINUED | OUTPATIENT
Start: 2024-11-18 | End: 2024-12-02 | Stop reason: HOSPADM

## 2024-11-18 RX ORDER — GLUCAGON 1 MG/ML
1 KIT INJECTION PRN
Status: CANCELLED | OUTPATIENT
Start: 2024-11-18

## 2024-11-18 RX ORDER — DEXTROSE MONOHYDRATE 100 MG/ML
INJECTION, SOLUTION INTRAVENOUS CONTINUOUS PRN
Status: CANCELLED | OUTPATIENT
Start: 2024-11-18

## 2024-11-18 RX ORDER — GLIPIZIDE 10 MG/1
10 TABLET ORAL
Status: CANCELLED | OUTPATIENT
Start: 2024-11-19

## 2024-11-18 RX ORDER — ALOGLIPTIN 12.5 MG/1
12.5 TABLET, FILM COATED ORAL DAILY
Status: CANCELLED | OUTPATIENT
Start: 2024-11-19

## 2024-11-18 RX ADMIN — MICONAZOLE NITRATE: 2 POWDER TOPICAL at 20:18

## 2024-11-18 RX ADMIN — TRIAMTERENE AND HYDROCHLOROTHIAZIDE 1 TABLET: 37.5; 25 TABLET ORAL at 08:05

## 2024-11-18 RX ADMIN — METFORMIN HYDROCHLORIDE 1000 MG: 500 TABLET ORAL at 20:17

## 2024-11-18 RX ADMIN — GLIPIZIDE 10 MG: 10 TABLET ORAL at 05:47

## 2024-11-18 RX ADMIN — MICONAZOLE NITRATE: 2 POWDER TOPICAL at 08:11

## 2024-11-18 RX ADMIN — BISACODYL 10 MG: 5 TABLET, COATED ORAL at 08:06

## 2024-11-18 RX ADMIN — OXYCODONE 10 MG: 5 TABLET ORAL at 14:47

## 2024-11-18 RX ADMIN — LOSARTAN POTASSIUM 50 MG: 50 TABLET, FILM COATED ORAL at 08:09

## 2024-11-18 RX ADMIN — PRAVASTATIN SODIUM 20 MG: 20 TABLET ORAL at 08:05

## 2024-11-18 RX ADMIN — OXYCODONE 10 MG: 5 TABLET ORAL at 11:39

## 2024-11-18 RX ADMIN — METFORMIN HYDROCHLORIDE 1000 MG: 500 TABLET ORAL at 08:05

## 2024-11-18 RX ADMIN — SENNOSIDES AND DOCUSATE SODIUM 2 TABLET: 50; 8.6 TABLET ORAL at 08:16

## 2024-11-18 RX ADMIN — ACETAMINOPHEN 650 MG: 325 TABLET ORAL at 08:06

## 2024-11-18 RX ADMIN — POLYETHYLENE GLYCOL 3350 17 G: 17 POWDER, FOR SOLUTION ORAL at 08:09

## 2024-11-18 RX ADMIN — OXYCODONE 10 MG: 5 TABLET ORAL at 22:30

## 2024-11-18 RX ADMIN — Medication 12.5 MG: at 05:47

## 2024-11-18 RX ADMIN — OXYCODONE 10 MG: 5 TABLET ORAL at 05:47

## 2024-11-18 RX ADMIN — OXYCODONE 10 MG: 5 TABLET ORAL at 01:06

## 2024-11-18 RX ADMIN — SENNOSIDES AND DOCUSATE SODIUM 2 TABLET: 50; 8.6 TABLET ORAL at 20:17

## 2024-11-18 RX ADMIN — EMPAGLIFLOZIN 10 MG: 10 TABLET, FILM COATED ORAL at 08:09

## 2024-11-18 RX ADMIN — ALOGLIPTIN 12.5 MG: 12.5 TABLET, FILM COATED ORAL at 08:08

## 2024-11-18 RX ADMIN — AMLODIPINE BESYLATE 10 MG: 10 TABLET ORAL at 08:07

## 2024-11-18 ASSESSMENT — PAIN - FUNCTIONAL ASSESSMENT
PAIN_FUNCTIONAL_ASSESSMENT: PREVENTS OR INTERFERES WITH MANY ACTIVE NOT PASSIVE ACTIVITIES
PAIN_FUNCTIONAL_ASSESSMENT: ACTIVITIES ARE NOT PREVENTED

## 2024-11-18 ASSESSMENT — PAIN SCALES - GENERAL
PAINLEVEL_OUTOF10: 4
PAINLEVEL_OUTOF10: 9
PAINLEVEL_OUTOF10: 10
PAINLEVEL_OUTOF10: 6
PAINLEVEL_OUTOF10: 4
PAINLEVEL_OUTOF10: 9
PAINLEVEL_OUTOF10: 10
PAINLEVEL_OUTOF10: 5
PAINLEVEL_OUTOF10: 8
PAINLEVEL_OUTOF10: 10
PAINLEVEL_OUTOF10: 9

## 2024-11-18 ASSESSMENT — PAIN DESCRIPTION - FREQUENCY: FREQUENCY: CONTINUOUS

## 2024-11-18 ASSESSMENT — PAIN DESCRIPTION - PAIN TYPE: TYPE: SURGICAL PAIN;ACUTE PAIN

## 2024-11-18 ASSESSMENT — PAIN SCALES - WONG BAKER: WONGBAKER_NUMERICALRESPONSE: NO HURT

## 2024-11-18 ASSESSMENT — PAIN DESCRIPTION - LOCATION
LOCATION: BACK
LOCATION: ABDOMEN

## 2024-11-18 ASSESSMENT — PAIN DESCRIPTION - DESCRIPTORS
DESCRIPTORS: ACHING;SORE
DESCRIPTORS: SHARP
DESCRIPTORS: ACHING;SHARP

## 2024-11-18 ASSESSMENT — PAIN DESCRIPTION - ORIENTATION
ORIENTATION: UPPER
ORIENTATION: LOWER

## 2024-11-18 ASSESSMENT — PAIN DESCRIPTION - ONSET: ONSET: ON-GOING

## 2024-11-18 NOTE — PROGRESS NOTES
[facility]  INPATIENT PHYSICAL THERAPY  DAILY NOTE  [facility] ORTHOPEDICS 7K - 7K-10/010-A      Discharge Recommendations: Subacte/Skilled Nursing Facility  Equipment Recommendations: No               Time In: 730  Time Out: 809  Timed Code Treatment Minutes: 39 Minutes  Minutes: 39          Date: 11/15/2024  Patient Name: Sadia Gomez,  Gender:  female        MRN: [id number]  : 1950  (74 y.o.)     Referring Practitioner: Fco Yepez PA  Diagnosis: Lumbar stenosis with neurogenic claudication  Additional Pertinent Hx: Pt is s/p status post T10-L2 decompression and fusion extension to T10, instrumentation to T11 completed by Dr. Parker on .     Prior Level of Function:  Lives With: Spouse  Type of Home: House  Home Layout: One level  Home Access: Stairs to enter without rails  Entrance Stairs - Number of Steps: 2 JOEY  Home Equipment: Walker - Rolling, Cane   Bathroom Shower/Tub: Walk-in shower  Bathroom Toilet: Handicap height    ADL Assistance: Independent  Homemaking Assistance: Independent  Ambulation Assistance: Independent  Transfer Assistance: Independent  Active : Yes  Additional Comments: IND with use of SC for mobility,  is not physically able to assist    Restrictions/Precautions:  Restrictions/Precautions: General Precautions, Fall Risk, Surgical Protocols  Required Braces or Orthoses  Spinal: Thoracic Lumbar Sacral Orthotics  Position Activity Restriction  Spinal Precautions: No Bending, No Lifting, No Twisting  Other position/activity restrictions: montior buckling     SUBJECTIVE: RN ok'ed session. Pt was A&Ox4 and motivated to get to chair. Pt was pleasant throughout with intermittent agitation.      PAIN: denies pain     Vitals: Vitals not assessed per clinical judgement, see nursing flowsheet    OBJECTIVE:  Bed Mobility:  Rolling to Right: Contact Guard Assistance, Minimal Assistance, X 1, with head of bed raised   Supine to Sit: Moderate Assistance, X 1, 
 [facility]  INPATIENT PHYSICAL THERAPY  DAILY NOTE  [facility] ORTHOPEDICS 7K - 7K-10/010-A      Discharge Recommendations: Inpatient Rehabilitation  Equipment Recommendations: No             Time In: 0854  Time Out: 0937  Timed Code Treatment Minutes: 43 Minutes  Minutes: 43          Date: 2024  Patient Name: Sadia Gomez,  Gender:  female        MRN: [id number]  : 1950  (74 y.o.)     Referring Practitioner: Fco Yepez PA  Diagnosis: Lumbar stenosis with neurogenic claudication  Additional Pertinent Hx: Pt is s/p status post T10-L2 decompression and fusion extension to T10, instrumentation to T11 completed by Dr. Parker on .     Prior Level of Function:  Lives With: Spouse  Type of Home: House  Home Layout: One level  Home Access: Stairs to enter without rails  Entrance Stairs - Number of Steps: 2 JOEY  Home Equipment: Walker - Rolling, Cane   Bathroom Shower/Tub: Walk-in shower  Bathroom Toilet: Handicap height    ADL Assistance: Independent  Homemaking Assistance: Independent  Ambulation Assistance: Independent  Transfer Assistance: Independent  Active : Yes  Additional Comments: IND with use of SC for mobility,  is not physically able to assist    Restrictions/Precautions:  Restrictions/Precautions: General Precautions, Fall Risk, Surgical Protocols  Required Braces or Orthoses  Spinal: Thoracic Lumbar Sacral Orthotics  Position Activity Restriction  Spinal Precautions: No Bending, No Lifting, No Twisting  Other position/activity restrictions: montior buckling     SUBJECTIVE: OK to see pt per nursing. Pt calling out requesting to use restroom, agreeable to PT session. Pt was educated on d.c plans and recommendations for short rehab stay. Pt continues to be agreeable. Pt able to recall 0/3 back precautions and required education.     PAIN: mild to moderate back pain    Vitals: Vitals not assessed per clinical judgement, see nursing flowsheet    OBJECTIVE:  Bed 
 [facility]  INPATIENT PHYSICAL THERAPY  DAILY NOTE  [facility] ORTHOPEDICS 7K - 7K-10/010-A      Discharge Recommendations: Inpatient Therapy Stay  Equipment Recommendations: No             Time In: 0805  Time Out: 0834  Timed Code Treatment Minutes: 29 Minutes  Minutes: 29          Date: 2024  Patient Name: Sadia Gomez,  Gender:  female        MRN: [id number]  : 1950  (74 y.o.)     Referring Practitioner: Fco Yepez PA  Diagnosis: Lumbar stenosis with neurogenic claudication  Additional Pertinent Hx: Pt is s/p status post T10-L2 decompression and fusion extension to T10, instrumentation to T11 completed by Dr. Parker on .     Prior Level of Function:  Lives With: Spouse  Type of Home: House  Home Layout: One level  Home Access: Stairs to enter without rails  Entrance Stairs - Number of Steps: 2 JOEY  Home Equipment: Walker - Rolling, Cane   Bathroom Shower/Tub: Walk-in shower  Bathroom Toilet: Handicap height    ADL Assistance: Independent  Homemaking Assistance: Independent  Ambulation Assistance: Independent  Transfer Assistance: Independent  Active : Yes  Additional Comments: IND with use of SC for mobility,  is not physically able to assist    Restrictions/Precautions:  Restrictions/Precautions: General Precautions, Fall Risk, Surgical Protocols  Required Braces or Orthoses  Spinal: Thoracic Lumbar Sacral Orthotics  Position Activity Restriction  Spinal Precautions: No Bending, No Lifting, No Twisting  Other position/activity restrictions: montior buckling     SUBJECTIVE: Ok to see pt per nursing. Pt eager to get out of bed this date. Pt encouraged to use restroom at brief was soiled. Pt agreeable. Pt agreeable to sit in chair this AM.     PAIN: 8/10: low back    Vitals: Vitals not assessed per clinical judgement, see nursing flowsheet    OBJECTIVE:  Bed Mobility:  Rolling to Right: Contact Guard Assistance, X 1, with head of bed flat, with rail, with verbal 
1814 Arouses to name on arrival to PACU , O2 3L NC applied and HOB elevated   1816 awake and oriented c/o # 10 back pain and very restless   1820 medicated with Dilaudid 0.5 mg IV   1825 pain unchanged , medicated with Dilaudid 0.5 mg IV   1830 pt less restless but continues to C/O # 10 back pain , medicated with Fentanyl 50 mcg IV   1835 pt unchanged , medicated with Fentanyl 50 mcg IV   1840 starting to rest on and off   1855 resting resp easy    1911 Awakens to name , states pain a # 7 - 8 but drifts back to sleep easily , meets criteria for discharge , transported to [address] [place] [address] sent to room by [facility]  
Department of Orthopedic Surgery  Spine Service  Attending Progress Note        Subjective:  POD#1 patient has not been out of bed, c/o surgical site pain. Denies leg pain or N/T.   Denies chest pain, SOB, n/v  Yancey in place  No BM      Vitals  VITALS:  /68   Pulse (!) 116   Temp 98.1 °F (36.7 °C) (Oral)   Resp 17   Ht 1.6 m (5' 3\")   Wt 76.7 kg (169 lb)   SpO2 100%   BMI 29.94 kg/m²   24HR INTAKE/OUTPUT:    Intake/Output Summary (Last 24 hours) at 11/13/2024 0623  Last data filed at 11/13/2024 0540  Gross per 24 hour   Intake 1350 ml   Output 1540 ml   Net -190 ml     URINARY CATHETER OUTPUT (Yancey):  Urinary Catheter 11/12/24 Yancey;Mkhmf-Wcpvafavhun-Lpzgfx (mL): 450 mL  DRAIN/TUBE OUTPUT:  Closed/Suction Drain Left;Superior;Medial Back Accordion-Output (ml): 120 ml  Closed/Suction Drain Right;Superior Back Accordion-Output (ml): 90 ml    PHYSICAL EXAM:    Orientation:  alert and oriented to person, place and time    Incision:  dressing in place, clean, dry, intact    Lower Extremity Motor :  quadriceps, extensor hallucis longus, dorsiflexion, plantarflexion 5/5 bilaterally  Lower Extremity Sensory:  Intact L1-S1    Flatus:  positive    LABS:    HgB:    Lab Results   Component Value Date/Time    HGB 15.2 10/23/2024 06:00 AM     Hemoglobin/Hematocrit:    Lab Results   Component Value Date/Time    HGB 15.2 10/23/2024 06:00 AM    HCT 46.0 10/23/2024 06:00 AM     BMP:    Lab Results   Component Value Date/Time     10/23/2024 06:00 AM    K 4.2 11/12/2024 12:46 PM     10/23/2024 06:00 AM    CO2 23 10/23/2024 06:00 AM    BUN 20 10/23/2024 06:00 AM    CREATININE 0.8 10/23/2024 06:00 AM    CALCIUM 9.6 10/23/2024 06:00 AM    LABGLOM 76 10/23/2024 06:00 AM    LABGLOM 83 05/23/2016 06:04 AM    GLUCOSE 75 10/23/2024 06:00 AM    GLUCOSE 133 05/05/2023 07:06 AM       ASSESSMENT AND PLAN:    Post operative day 1 status post T10-L2 decompression and fusion extension to T10, instrumentation to T11    1:  
Department of Orthopedic Surgery  Spine Service  Attending Progress Note        Subjective:  POD#2 patient has not been out of bed yet, brace received yesterday. Denies leg pain or N/T. Back pain currently controlled.   Yancey in place. No BM  AM labs pending  Denies chest pain, SOB, n/v. Hgb 12    Vitals  VITALS:  BP (!) 127/54   Pulse (!) 106   Temp 98.4 °F (36.9 °C) (Oral)   Resp 17   Ht 1.6 m (5' 3\")   Wt 76.7 kg (169 lb)   SpO2 93%   BMI 29.94 kg/m²   24HR INTAKE/OUTPUT:    Intake/Output Summary (Last 24 hours) at 11/14/2024 0719  Last data filed at 11/14/2024 0611  Gross per 24 hour   Intake 770 ml   Output 2760 ml   Net -1990 ml     URINARY CATHETER OUTPUT (Yancey):  Urinary Catheter 11/12/24 Yancey;Dlwbp-Djsnjehclax-Xklome (mL): 1400 mL  DRAIN/TUBE OUTPUT:  Closed/Suction Drain Left;Superior;Medial Back Accordion-Output (ml): 30 ml  Closed/Suction Drain Right;Superior Back Accordion-Output (ml): 35 ml    PHYSICAL EXAM:    Orientation:  alert and oriented to person, place and time    Incision:  dressing in place, clean, dry, intact    Lower Extremity Motor :  quadriceps, extensor hallucis longus, dorsiflexion, plantarflexion 5/5 bilaterally  Lower Extremity Sensory:  Intact L1-S1    Flatus:  positive    LABS:    HgB:    Lab Results   Component Value Date/Time    HGB 12.3 11/13/2024 06:07 AM     Hemoglobin/Hematocrit:    Lab Results   Component Value Date/Time    HGB 12.3 11/13/2024 06:07 AM    HCT 37.9 11/13/2024 06:07 AM     BMP:    Lab Results   Component Value Date/Time     11/13/2024 06:07 AM    K 4.6 11/13/2024 06:07 AM     11/13/2024 06:07 AM    CO2 21 11/13/2024 06:07 AM    BUN 26 11/13/2024 06:07 AM    CREATININE 1.2 11/13/2024 06:07 AM    CALCIUM 8.6 11/13/2024 06:07 AM    LABGLOM 47 11/13/2024 06:07 AM    LABGLOM 83 05/23/2016 06:04 AM    GLUCOSE 204 11/13/2024 06:07 AM    GLUCOSE 133 05/05/2023 07:06 AM       ASSESSMENT AND PLAN:    Post operative day 2 status post T10-L2 
Department of Orthopedic Surgery  Spine Service  Attending Progress Note        Subjective:  POD#3 c/o lumbar pain. Patient Denies leg pain or N/T.   No BM. Patient does report pain all over, but upon further discussion it is just her lumbar. Patient unable to ambulate without 1-2 assist at this time.     Vitals  VITALS:  /64   Pulse 100   Temp 98.4 °F (36.9 °C) (Oral)   Resp 16   Ht 1.6 m (5' 3\")   Wt 76.7 kg (169 lb)   SpO2 93%   BMI 29.94 kg/m²   24HR INTAKE/OUTPUT:    Intake/Output Summary (Last 24 hours) at 11/15/2024 0652  Last data filed at 11/15/2024 0443  Gross per 24 hour   Intake 560 ml   Output 1070 ml   Net -510 ml     URINARY CATHETER OUTPUT (Yancey):  [REMOVED] Urinary Catheter 11/12/24 Yancey;Koatc-Ecdbgnpnxmw-Kncuyh (mL): 950 mL  DRAIN/TUBE OUTPUT:  Closed/Suction Drain Left;Superior;Medial Back Accordion-Output (ml): 20 ml  Closed/Suction Drain Right;Superior Back Accordion-Output (ml): 0 ml    PHYSICAL EXAM:    Orientation:  alert and oriented to person, place and time    Incision:  dressing in place, clean, dry, intact    Lower Extremity Motor :  quadriceps, extensor hallucis longus, dorsiflexion, plantarflexion 5/5 bilaterally  Lower Extremity Sensory:  Intact L1-S1    Flatus:  positive    LABS:    HgB:    Lab Results   Component Value Date/Time    HGB 10.2 11/14/2024 09:09 AM     Hemoglobin/Hematocrit:    Lab Results   Component Value Date/Time    HGB 10.2 11/14/2024 09:09 AM    HCT 32.3 11/14/2024 09:09 AM     BMP:    Lab Results   Component Value Date/Time     11/14/2024 09:09 AM    K 3.2 11/14/2024 09:09 AM     11/14/2024 09:09 AM    CO2 23 11/14/2024 09:09 AM    BUN 21 11/14/2024 09:09 AM    CREATININE 1.0 11/14/2024 09:09 AM    CALCIUM 8.1 11/14/2024 09:09 AM    LABGLOM 58 11/14/2024 09:09 AM    LABGLOM 83 05/23/2016 06:04 AM    GLUCOSE 234 11/14/2024 09:09 AM    GLUCOSE 133 05/05/2023 07:06 AM       ASSESSMENT AND PLAN:    Post operative day 3 status post T10-L2 
Department of Orthopedic Surgery  Spine Service  Attending Progress Note        Subjective:  POD#4 c/o back pain. Denies radicular complaints, Ambulating to bathroom and back with assistance  No BM  No issues voiding   Hgb 9.9.    Vitals  VITALS:  /65   Pulse 99   Temp 98.4 °F (36.9 °C) (Oral)   Resp 18   Ht 1.6 m (5' 3\")   Wt 76.7 kg (169 lb)   SpO2 94%   BMI 29.94 kg/m²   24HR INTAKE/OUTPUT:    Intake/Output Summary (Last 24 hours) at 11/16/2024 0755  Last data filed at 11/16/2024 0657  Gross per 24 hour   Intake 1240 ml   Output 520 ml   Net 720 ml     URINARY CATHETER OUTPUT (Yancey):  [REMOVED] External Urinary Catheter-Output (mL): 375 mL  [REMOVED] Urinary Catheter 11/12/24 Yancey;Npwrg-Pwcyaushryg-Ncowan (mL): 950 mL  DRAIN/TUBE OUTPUT:  Closed/Suction Drain Left;Superior;Medial Back Accordion-Output (ml): 15 ml  Closed/Suction Drain Right;Superior Back Accordion-Output (ml): 20 ml    PHYSICAL EXAM:    Orientation:  alert and oriented to person, place and time    Incision:  dressing in place, clean, dry, intact    Lower Extremity Motor :  quadriceps, extensor hallucis longus, dorsiflexion, plantarflexion 5/5 bilaterally  Lower Extremity Sensory:  Intact L1-S1    Flatus:  positive    LABS:    HgB:    Lab Results   Component Value Date/Time    HGB 9.9 11/16/2024 05:54 AM     Hemoglobin/Hematocrit:    Lab Results   Component Value Date/Time    HGB 9.9 11/16/2024 05:54 AM    HCT 30.2 11/16/2024 05:54 AM     BMP:    Lab Results   Component Value Date/Time     11/16/2024 05:54 AM    K 3.5 11/16/2024 05:54 AM     11/16/2024 05:54 AM    CO2 25 11/16/2024 05:54 AM    BUN 16 11/16/2024 05:54 AM    CREATININE 0.6 11/16/2024 05:54 AM    CALCIUM 8.9 11/16/2024 05:54 AM    LABGLOM > 90 11/16/2024 05:54 AM    LABGLOM 83 05/23/2016 06:04 AM    GLUCOSE 162 11/16/2024 05:54 AM    GLUCOSE 133 05/05/2023 07:06 AM       ASSESSMENT AND PLAN:    Post operative day 4 status post T10-L2 decompression and 
Department of Orthopedic Surgery  Spine Service  Attending Progress Note        Subjective:  POD#5 Patient is sitting in chair. Patient is drowsy. c/o back pain. Denies radicular complaints  No BM  No issues voiding   Hgb 9.1.    Vitals  VITALS:  BP (!) 102/50   Pulse 98   Temp 97.7 °F (36.5 °C) (Oral)   Resp 18   Ht 1.6 m (5' 3\")   Wt 76.7 kg (169 lb)   SpO2 95%   BMI 29.94 kg/m²   24HR INTAKE/OUTPUT:    Intake/Output Summary (Last 24 hours) at 11/17/2024 1055  Last data filed at 11/17/2024 0405  Gross per 24 hour   Intake 640 ml   Output 51.5 ml   Net 588.5 ml     URINARY CATHETER OUTPUT (Yancey):  [REMOVED] External Urinary Catheter-Output (mL): 375 mL  [REMOVED] Urinary Catheter 11/12/24 Yancey;Gowmd-Tggohlmywop-Ydhkes (mL): 950 mL  DRAIN/TUBE OUTPUT:  Closed/Suction Drain Left;Superior;Medial Back Accordion-Output (ml): 15 ml  Closed/Suction Drain Right;Superior Back Accordion-Output (ml): 1 ml    PHYSICAL EXAM:    Orientation:  alert and oriented to person, place and time    Incision:  dressing in place, clean, dry, intact    Lower Extremity Motor :  quadriceps, extensor hallucis longus, dorsiflexion, plantarflexion 5/5 bilaterally  Lower Extremity Sensory:  Intact L1-S1    Flatus:  positive    LABS:    HgB:    Lab Results   Component Value Date/Time    HGB 9.1 11/17/2024 06:24 AM     Hemoglobin/Hematocrit:    Lab Results   Component Value Date/Time    HGB 9.1 11/17/2024 06:24 AM    HCT 28.2 11/17/2024 06:24 AM     BMP:    Lab Results   Component Value Date/Time     11/17/2024 06:24 AM    K 3.4 11/17/2024 06:24 AM     11/17/2024 06:24 AM    CO2 26 11/17/2024 06:24 AM    BUN 26 11/17/2024 06:24 AM    CREATININE 0.7 11/17/2024 06:24 AM    CALCIUM 8.6 11/17/2024 06:24 AM    LABGLOM 89 11/17/2024 06:24 AM    LABGLOM 83 05/23/2016 06:04 AM    GLUCOSE 130 11/17/2024 06:24 AM    GLUCOSE 133 05/05/2023 07:06 AM       ASSESSMENT AND PLAN:    Post operative day 5 status post T10-L2 decompression and 
Department of Orthopedic Surgery  Spine Service  Attending Progress Note        Subjective:  POD#6 patient in bed, pain currently controlled. Denies radicular symptoms. +BM. Denies n/v. Hemovac drains removed yesterday.   AM labs pending    Vitals  VITALS:  /64   Pulse 94   Temp 98.4 °F (36.9 °C) (Oral)   Resp 16   Ht 1.6 m (5' 3\")   Wt 76.7 kg (169 lb)   SpO2 94%   BMI 29.94 kg/m²   24HR INTAKE/OUTPUT:    Intake/Output Summary (Last 24 hours) at 11/18/2024 0701  Last data filed at 11/18/2024 0511  Gross per 24 hour   Intake 500 ml   Output --   Net 500 ml     URINARY CATHETER OUTPUT (Yancey):  [REMOVED] External Urinary Catheter-Output (mL): 375 mL  [REMOVED] Urinary Catheter 11/12/24 Yancey;Qzjsk-Nsmvnlhqdwf-Huafpx (mL): 950 mL  DRAIN/TUBE OUTPUT:  [REMOVED] Closed/Suction Drain Left;Superior;Medial Back Accordion-Output (ml): 15 ml  [REMOVED] Closed/Suction Drain Right;Superior Back Accordion-Output (ml): 1 ml    PHYSICAL EXAM:    Orientation:  alert and oriented to person, place and time    Incision:  dressing in place, clean, dry, intact    Lower Extremity Motor :  5/5 left pedal push/pull. 4/5 right pedal push/pull  Lower Extremity Sensory:  Intact L1-S1    Flatus:  positive    LABS:    HgB:    Lab Results   Component Value Date/Time    HGB 9.1 11/17/2024 06:24 AM     Hemoglobin/Hematocrit:    Lab Results   Component Value Date/Time    HGB 9.1 11/17/2024 06:24 AM    HCT 28.2 11/17/2024 06:24 AM     BMP:    Lab Results   Component Value Date/Time     11/17/2024 06:24 AM    K 3.4 11/17/2024 06:24 AM     11/17/2024 06:24 AM    CO2 26 11/17/2024 06:24 AM    BUN 26 11/17/2024 06:24 AM    CREATININE 0.7 11/17/2024 06:24 AM    CALCIUM 8.6 11/17/2024 06:24 AM    LABGLOM 89 11/17/2024 06:24 AM    LABGLOM 83 05/23/2016 06:04 AM    GLUCOSE 130 11/17/2024 06:24 AM    GLUCOSE 133 05/05/2023 07:06 AM       ASSESSMENT AND PLAN:    Post operative day 6 status post T10-L2 decompression and fusion 
[facility] ORTHOPEDICS 7K  Occupational Therapy  Daily Note    Discharge Recommendations: Inpatient Rehabilitation  Equipment Recommendations: No defer to next levl of care    Time In: 1413  Time Out: 1436  Timed Code Treatment Minutes: 23 Minutes  Minutes: 23        Date: 2024  Patient Name: Sadia Gomez,   Gender: female      Room: [address] [place] [address]  MRN: [id number]  : 1950  (74 y.o.)  Referring Practitioner: Fco Yepez PA  Diagnosis: Lumbar stenosis with neurogenic claudication  Additional Pertinent Hx: This is a 74 y.o. female who is status post L2-S1 decompression and fusion done in 2016 with refractory back and leg pain with numbness and weakness from degenerative disc disease and lumbar stenosis from T10-L2. Pt underwent Hardware Removal L2-S1, T12-L2 Decompression and Fusion Extension to T11 on  with Dr. Orlando    Restrictions/Precautions:  Restrictions/Precautions: General Precautions, Fall Risk, Surgical Protocols  Required Braces or Orthoses  Spinal: Thoracic Lumbar Sacral Orthotics  Position Activity Restriction  Spinal Precautions: No Bending, No Lifting, No Twisting  Other position/activity restrictions: montior buckling     Social/Functional History:  Lives With: Spouse  Type of Home: House  Home Layout: One level  Home Access: Stairs to enter without rails  Entrance Stairs - Number of Steps: 2 JOEY  Home Equipment: Walker - Rolling, Cane   Bathroom Shower/Tub: Walk-in shower  Bathroom Toilet: Handicap height       ADL Assistance: Independent  Homemaking Assistance: Independent  Ambulation Assistance: Independent  Transfer Assistance: Independent    Active : Yes  Occupation: Retired  Additional Comments: IND with use of SC for mobility,  is not physically able to assist    SUBJECTIVE: Nurse approved OT treatment. Pt with nurse on arrival after going to the bathroom. Pt is agreeable to OT treatment after short rest break sitting EOB    PAIN: does not 
[facility]  Acute Inpatient Rehab Preadmission Assessment    Patient Name: Sadia Gomez        Ethnicity:Not of , /a, or [place] origin  Race:White  MRN: [id number]    : 1950  (74 y.o.)  Gender: female     Admitted from:[facility]  Initial Assessment    Date of admission to the hospital: 2024 12:22 PM  Date patient eligible for admission:2024    Primary Diagnosis: Lumbar stenosis with radiculopathy      Did patient have surgery?  yes - 1.  L2-S1 exploration of fusion  2.  T10-L2 bilateral laminectomy, partial medial facetectomies and foraminotomies of T10, T11, T12, L1, L2, nerve roots   3.  T10-S1 posterior spinal fusion extension using hang connectors  4.  T11-L1 posterior spinal instrumentation, Cortera, Xtant instrumentation; retained instrumentation L2-S1  5.  Use of local autograft bone    on 24 with Dr Dickinson    Physicians: Micah Dickinson MD, Dr Dennis, Dr Yañez    Risk for clinical complications/co-morbidities:   Past Medical History:   Diagnosis Date    Arthritis     Diabetes mellitus (HCC)     Hypertension     MDRO (multiple drug resistant organisms) resistance     Sleep apnea with use of continuous positive airway pressure (CPAP)        Financial Information  Primary insurance: Medicare    Secondary Insurance:   [facility]    Has the patient had two or more falls in the past year or any fall with injury in the past year?   no    Did the patient have major surgery during the 100 days prior to admission?  yes    Precautions:   falls  Restrictions/Precautions: General Precautions, Fall Risk, Surgical Protocols  Other position/activity restrictions: montior buckling  Required Braces or Orthoses  Spinal: Thoracic Lumbar Sacral Orthotics    Isolation Precautions: None       Physiatrist:  Dr Dennis    Patients Occupation: Retired  Reviewed Lab and Diagnostic reports from Current Admission: Yes    Patients Prior Functional  Level: Prior 
INTERNAL MEDICINE Progress Note  11/14/2024 3:53 PM  Subjective:   Admit Date: 11/12/2024  PCP: Srini Pisano MD  Interval History:     Patient seen.  Resting comfortably out of bed.  Pain under control.  No headache, no chest pain, no shortness of breath.    Objective:   Vitals: BP (!) 115/56   Pulse (!) 104   Temp 98 °F (36.7 °C) (Oral)   Resp 18   Ht 1.6 m (5' 3\")   Wt 76.7 kg (169 lb)   SpO2 94%   BMI 29.94 kg/m²   General appearance: alert and cooperative with exam  HEENT: Head: atraumatic  Neck: no adenopathy, no carotid bruit, and no JVD  Lungs: clear to auscultation bilaterally  Heart: S1, S2 normal  Abdomen: soft, non-tender; bowel sounds normal; no masses,  no organomegaly  Extremities: extremities normal, atraumatic, no cyanosis or edema  Neurologic: Mental status: Alert, oriented, thought content appropriate  Back: x2 Drains in situ      Medications:   Scheduled Meds:   miconazole   Topical BID    amLODIPine  10 mg Oral Daily    empagliflozin  10 mg Oral Daily    glipiZIDE  10 mg Oral QAM AC    losartan  50 mg Oral Daily    metFORMIN  1,000 mg Oral BID    pravastatin  20 mg Oral Daily    alogliptin  12.5 mg Oral Daily    triamterene-hydroCHLOROthiazide  1 tablet Oral Daily    sodium chloride flush  5-40 mL IntraVENous 2 times per day    polyethylene glycol  17 g Oral Daily    bisacodyl  5 mg Oral Daily    sennosides-docusate sodium  1 tablet Oral BID    insulin lispro  0-16 Units SubCUTAneous 4x Daily AC & HS     Continuous Infusions:   sodium chloride 125 mL/hr at 11/14/24 1150    sodium chloride      dextrose         Lab Results:   CBC:   Recent Labs     11/13/24  0607 11/14/24  0909   HGB 12.3 10.2*     BMP:    Recent Labs     11/12/24  1246 11/13/24  0607 11/14/24  0909   NA  --  144 146*   K 4.2 4.6 3.2*   CL  --  109 111   CO2  --  21* 23   BUN  --  26* 21   CREATININE  --  1.2 1.0   GLUCOSE  --  204* 234*       HgBA1c:    Lab Results   Component Value Date/Time    LABA1C 6.6 10/23/2024 
INTERNAL MEDICINE Progress Note  11/15/2024 2:33 PM  Subjective:   Admit Date: 11/12/2024  PCP: Srini Pisano MD  Interval History:     No new c/o  No headache, no chest pain, no shortness of breath.    Objective:   Vitals: /64   Pulse (!) 106   Temp 97.6 °F (36.4 °C) (Oral)   Resp 16   Ht 1.6 m (5' 3\")   Wt 76.7 kg (169 lb)   SpO2 97%   BMI 29.94 kg/m²   General appearance: alert and cooperative with exam  HEENT:  atraumatic  Neck: no adenopathy, no carotid bruit, and no JVD  Lungs: clear to auscultation bilaterally  Heart: S1, S2 normal  Abdomen: soft, non-tender; bowel sounds normal; no masses,  no organomegaly  Extremities: extremities normal, atraumatic, no cyanosis or edema  Neurologic: Alert, oriented, thought content appropriate    Medications:   Scheduled Meds:   miconazole   Topical BID    amLODIPine  10 mg Oral Daily    empagliflozin  10 mg Oral Daily    glipiZIDE  10 mg Oral QAM AC    losartan  50 mg Oral Daily    metFORMIN  1,000 mg Oral BID    pravastatin  20 mg Oral Daily    alogliptin  12.5 mg Oral Daily    triamterene-hydroCHLOROthiazide  1 tablet Oral Daily    sodium chloride flush  5-40 mL IntraVENous 2 times per day    polyethylene glycol  17 g Oral Daily    bisacodyl  5 mg Oral Daily    sennosides-docusate sodium  1 tablet Oral BID    insulin lispro  0-16 Units SubCUTAneous 4x Daily AC & HS     Continuous Infusions:   sodium chloride 125 mL/hr at 11/14/24 1150    sodium chloride      dextrose         Lab Results:   CBC:   Recent Labs     11/13/24  0607 11/14/24  0909 11/15/24  0651   HGB 12.3 10.2* 10.9*     BMP:    Recent Labs     11/13/24  0607 11/14/24  0909 11/15/24  0651    146* 142   K 4.6 3.2* 3.6    111 105   CO2 21* 23 22*   BUN 26* 21 14   CREATININE 1.2 1.0 0.7   GLUCOSE 204* 234* 149*       HgBA1c:    Lab Results   Component Value Date/Time    LABA1C 6.6 10/23/2024 06:00 AM         Assessment and Plan:   T10-L2 degenerative disc disease with T10-L2 lumbar 
INTERNAL MEDICINE Progress Note  11/16/2024 1:22 PM  Subjective:   Admit Date: 11/12/2024  PCP: Srini Pisano MD  Interval History:     No headache,   no chest pain,   no shortness of breath.    Objective:   Vitals: BP (!) 139/57   Pulse 89   Temp 97.5 °F (36.4 °C) (Oral)   Resp 18   Ht 1.6 m (5' 3\")   Wt 76.7 kg (169 lb)   SpO2 95%   BMI 29.94 kg/m²   General appearance: alert and cooperative with exam  HEENT:  atraumatic  Neck: no adenopathy, no carotid bruit, and no JVD  Lungs: clear to auscultation bilaterally  Heart: S1, S2 normal  Abdomen: soft, non-tender; bowel sounds normal; no masses,  no organomegaly  Extremities: extremities normal, atraumatic, no cyanosis or edema  Neurologic: Alert, oriented, thought content appropriate    Medications:   Scheduled Meds:   miconazole   Topical BID    amLODIPine  10 mg Oral Daily    empagliflozin  10 mg Oral Daily    glipiZIDE  10 mg Oral QAM AC    losartan  50 mg Oral Daily    metFORMIN  1,000 mg Oral BID    pravastatin  20 mg Oral Daily    alogliptin  12.5 mg Oral Daily    triamterene-hydroCHLOROthiazide  1 tablet Oral Daily    sodium chloride flush  5-40 mL IntraVENous 2 times per day    polyethylene glycol  17 g Oral Daily    bisacodyl  5 mg Oral Daily    sennosides-docusate sodium  1 tablet Oral BID    insulin lispro  0-16 Units SubCUTAneous 4x Daily AC & HS     Continuous Infusions:   sodium chloride 125 mL/hr at 11/14/24 1150    sodium chloride      dextrose         Lab Results:   CBC:   Recent Labs     11/14/24  0909 11/15/24  0651 11/16/24  0554   HGB 10.2* 10.9* 9.9*     BMP:    Recent Labs     11/14/24  0909 11/15/24  0651 11/16/24  0554   * 142 141   K 3.2* 3.6 3.5    105 104   CO2 23 22* 25   BUN 21 14 16   CREATININE 1.0 0.7 0.6   GLUCOSE 234* 149* 162*       HgBA1c:    Lab Results   Component Value Date/Time    LABA1C 6.6 10/23/2024 06:00 AM         Assessment and Plan:   T10-L2 degenerative disc disease with T10-L2 lumbar stenosis 
INTERNAL MEDICINE Progress Note  11/17/2024 1:07 PM  Subjective:   Admit Date: 11/12/2024  PCP: Srini Pisano MD  Interval History:     Reports constipation, no abd pain, no N/V    no chest pain,   no shortness of breath.    Objective:   Vitals: BP (!) 102/50   Pulse 98   Temp 97.7 °F (36.5 °C) (Oral)   Resp 18   Ht 1.6 m (5' 3\")   Wt 76.7 kg (169 lb)   SpO2 95%   BMI 29.94 kg/m²   General appearance: alert and cooperative with exam  HEENT:  atraumatic  Neck: no adenopathy, no carotid bruit, and no JVD  Lungs: clear to auscultation bilaterally  Heart: S1, S2 normal  Abdomen: soft, non-tender; bowel sounds normal; no masses,  no organomegaly  Extremities: extremities normal, atraumatic, no cyanosis or edema  Neurologic: Alert, oriented, thought content appropriate    Medications:   Scheduled Meds:   miconazole   Topical BID    amLODIPine  10 mg Oral Daily    empagliflozin  10 mg Oral Daily    glipiZIDE  10 mg Oral QAM AC    losartan  50 mg Oral Daily    metFORMIN  1,000 mg Oral BID    pravastatin  20 mg Oral Daily    alogliptin  12.5 mg Oral Daily    triamterene-hydroCHLOROthiazide  1 tablet Oral Daily    sodium chloride flush  5-40 mL IntraVENous 2 times per day    polyethylene glycol  17 g Oral Daily    bisacodyl  5 mg Oral Daily    sennosides-docusate sodium  1 tablet Oral BID    insulin lispro  0-16 Units SubCUTAneous 4x Daily AC & HS     Continuous Infusions:   sodium chloride 125 mL/hr at 11/14/24 1150    sodium chloride      dextrose         Lab Results:   CBC:   Recent Labs     11/15/24  0651 11/16/24  0554 11/17/24  0624   HGB 10.9* 9.9* 9.1*     BMP:    Recent Labs     11/15/24  0651 11/16/24  0554 11/17/24  0624    141 138   K 3.6 3.5 3.4*    104 101   CO2 22* 25 26   BUN 14 16 26*   CREATININE 0.7 0.6 0.7   GLUCOSE 149* 162* 130*       HgBA1c:    Lab Results   Component Value Date/Time    LABA1C 6.6 10/23/2024 06:00 AM         Assessment and Plan:   T10-L2 degenerative disc disease 
[facility] ORTHOPEDICS 7K  Occupational Therapy  Daily Note    Discharge Recommendations: Inpatient Therapy Stay  Equipment Recommendations: No defer to next levl of care      Time In: 0956  Time Out: 1027  Timed Code Treatment Minutes: 31 Minutes  Minutes: 31          Date: 11/15/2024  Patient Name: Sadia Gomez,   Gender: female      Room: [address] [place] [address]  MRN: [id number]  : 1950  (74 y.o.)  Referring Practitioner: Fco Yepez PA  Diagnosis: Lumbar stenosis with neurogenic claudication  Additional Pertinent Hx: This is a 74 y.o. female who is status post L2-S1 decompression and fusion done in 2016 with refractory back and leg pain with numbness and weakness from degenerative disc disease and lumbar stenosis from T10-L2. Pt underwent Hardware Removal L2-S1, T12-L2 Decompression and Fusion Extension to T11 on  with Dr. Orlando    Restrictions/Precautions:  Restrictions/Precautions: General Precautions, Fall Risk, Surgical Protocols  Required Braces or Orthoses  Spinal: Thoracic Lumbar Sacral Orthotics  Position Activity Restriction  Spinal Precautions: No Bending, No Lifting, No Twisting  Other position/activity restrictions: montior buckling     Social/Functional History:  Lives With: Spouse  Type of Home: House  Home Layout: One level  Home Access: Stairs to enter without rails  Entrance Stairs - Number of Steps: 2 JOEY  Home Equipment: Walker - Rolling, Cane   Bathroom Shower/Tub: Walk-in shower  Bathroom Toilet: Handicap height       ADL Assistance: Independent  Homemaking Assistance: Independent  Ambulation Assistance: Independent  Transfer Assistance: Independent    Active : Yes  Occupation: Retired  Additional Comments: IND with use of SC for mobility,  is not physically able to assist    SUBJECTIVE: RN okayed OT session.  Pt. In room upon arrival and asking to toilet.     PAIN: back pain did not rate    Vitals: Vitals not assessed per clinical judgement, see 
[facility]  INPATIENT PHYSICAL THERAPY  EVALUATION  [facility] ORTHOPEDICS 7K - 7K-10/010-A    Discharge Recommendations: Continue to assess pending progress, Subacute/Skilled Nursing Facility, pt not able to tolerate 3 hours of therapy a day. Not sure pt would be willing/motivated to participate in therapy for 3 hours a day as well.   Equipment Recommendations: No             Time In: 1132  Time Out: 1220  Timed Code Treatment Minutes: 39 Minutes  Minutes: 48          Date: 2024  Patient Name: Sadia Gomez,  Gender:  female        MRN: [id number]  : 1950  (74 y.o.)      Referring Practitioner: Fco Yepez PA  Diagnosis: Lumbar stenosis with neurogenic claudication  Additional Pertinent Hx: Pt is s/p status post T10-L2 decompression and fusion extension to T10, instrumentation to T11 completed by Dr. Parker on .     Restrictions/Precautions:  Restrictions/Precautions: General Precautions, Fall Risk, Surgical Protocols       Spinal Precautions: No Bending, No Lifting, No Twisting  Other position/activity restrictions: montior buckling    Required Braces or Orthoses?: Yes  Spinal: Thoracic Lumbar Sacral Orthotics      Subjective:  Chart Reviewed: Yes  Patient assessed for rehabilitation services?: Yes  Subjective: OK to see pt per nursing. Pt in bed when PT arrived,  present and supportive. Pt irritable most of session, encouraged thorugout to \"not yell/raise voice at this therapist.\" Pt frequently asking the same questions and this therapist had to repeat self throughtou session. Pt frequently kept eyes closed, encouraged to keep eyes open for most of session, pt just asking \"why?\" to ensure she knows the next steps and what is going on during session. Pt tends to sigh and rolls eyes at this therapist. Pt required increased time for all tasks during session due to pain and tolerance overall. Pt frequently instructed to complete task with little initiation and increased time 
[facility]  INPATIENT REHABILITATION  ADMISSIONS COORDINATOR CONSULT    Referral Type: internal    Patient Name: Sadia Gomez      MRN: [id number]    : 1950  (74 y.o.)  Gender: female   Race:White (non-)     Payor Source: Payor: MEDICARE / Plan: MEDICARE PART A AND B / Product Type: *No Product type* /   Secondary Payor Source:  BCBS    Isolation Status: No active isolations    Lives With: Spouse  Type of Home: House  Home Layout: One level  Home Access: Stairs to enter without rails  Entrance Stairs - Number of Steps: 2 JOEY     Occupation: Retired  Additional Comments: IND with use of SC for mobility,  is not physically able to assist    Disciplines Required upon Admission to Inpatient Rehabilitation: Physical Therapy and Occupational Therapy  Post operative: Yes  Fall: No  Dialysis: No  Diet: ADULT DIET; Regular  ADULT ORAL NUTRITION SUPPLEMENT; Breakfast, Lunch, Dinner; Other Oral Supplement; Activia and hot beverage  Discussed patient with  and PM&R provider: Await medical work up completion including completed PM&R note for further determination of patient needs.   2:15 Rounded on unit, spoke with patient and  regarding rehab referral.  The couple were indecisive at this time.  Left contact information and Rehab pamphlet and explained that we will round again on Monday to follow up.  CM team updated.      
[facility]  PHYSICAL THERAPY MISSED TREATMENT NOTE  [facility] ORTHOPED[facility] 7K    Date: 2024  Patient Name: Sadia Gomez        MRN: [id number]   : 1950  (74 y.o.)  Gender: female   Referring Practitioner: Fco Yepez PA            REASON FOR MISSED TREATMENT:  Hold PT eval this AM, awaiting delivery of TLSO. Will check back for completion of PT eval once delivered.           JERRY SheaT    
[facility]  OCCUPATIONAL THERAPY MISSED TREATMENT NOTE  [facility]THOP[facility] 7K  7K-10/010-A      Date: 2024  Patient Name: Sadia Gomez        CSN: [id number]   : 1950  (74 y.o.)  Gender: female                REASON FOR MISSED TREATMENT:  Hold OT eval this AM, awaiting delivery of TLSO. Will check back for completion of OT eval once delivered.                 
PAT VISIT  Appointment reminder call given  Date: 10/23  Arrival time: 12:15 and location; [facility]st [facility]   Bring Drivers license and insurance  Bring Medications in original bottles  Please be ready to give Urine Sample  If possible bring caregiver for appointment  Take am medications with water unless you are holding any for surgery  Appointment may last 2 hours    
Patient oriented to Same Day department and admitted to Same Day Surgery room 15.   Patient verbalized approval for first name, last initial with physician name on unit whiteboard.     Plan of care reviewed with patient.   Patient room whiteboard filled out and discussed with patient and responsible adult.   Patient and responsible adult offered Same Day Welcome Packet to review.    Call light in reach.   Bed in lowest position, locked, with one bed rail up.   SCDs and warming blanket in place.  Appropriate arm bands on patient.   Bathroom offered.   All questions and concerns of patient addressed.        Meds to Beds:   Patient informed of [facility] Meds to Beds program during admission. Patient is agreeable to program.   Contact information for the pharmacy and the Meds to Beds program:   Name: Sadia   Relationship to patient:patient   Phone number: in house           
Pt was in bed and alone at the time of the visit. She was dealing with acute cholecystitis. She was encouraged and anointed.    11/13/24 1546   Encounter Summary   Encounter Overview/Reason Initial Encounter   Service Provided For Patient   Referral/Consult From [facility]   Support System Family members   Last Encounter  11/13/24  (Anointed)   Complexity of Encounter Low   Spiritual/Emotional needs   Type Spiritual Support   Rituals, Rites and Sacraments   Type Anointing   Assessment/Intervention/Outcome   Assessment Hopeful   Intervention Empowerment       
Report given to 8K nurse. No questions.   
Spoke with patient, agreeable to IPR and feels able to tolerate 3 hours therapy. Tolerance improved over weekend. Plan admit to IPR today 8k27.  
[facility] ORTHOPEDICS 7K  Occupational Therapy  Daily Note    Discharge Recommendations: Inpatient Rehabilitation  Equipment Recommendations: No defer to next levl of care      Time In: 0945  Time Out: 1015  Timed Code Treatment Minutes: 30 Minutes  Minutes: 30          Date: 2024  Patient Name: Sadia Gomez,   Gender: female      Room: [address] [place] [address]  MRN: [id number]  : 1950  (74 y.o.)  Referring Practitioner: Fco Yepez PA  Diagnosis: Lumbar stenosis with neurogenic claudication  Additional Pertinent Hx: This is a 74 y.o. female who is status post L2-S1 decompression and fusion done in 2016 with refractory back and leg pain with numbness and weakness from degenerative disc disease and lumbar stenosis from T10-L2. Pt underwent Hardware Removal L2-S1, T12-L2 Decompression and Fusion Extension to T11 on  with Dr. Orlando    Restrictions/Precautions:  Restrictions/Precautions: General Precautions, Fall Risk, Surgical Protocols  Required Braces or Orthoses  Spinal: Thoracic Lumbar Sacral Orthotics  Position Activity Restriction  Spinal Precautions: No Bending, No Lifting, No Twisting  Other position/activity restrictions: montior buckling     Social/Functional History:  Lives With: Spouse  Type of Home: House  Home Layout: One level  Home Access: Stairs to enter without rails  Entrance Stairs - Number of Steps: 2 JOEY  Home Equipment: Walker - Rolling, Cane   Bathroom Shower/Tub: Walk-in shower  Bathroom Toilet: Handicap height       ADL Assistance: Independent  Homemaking Assistance: Independent  Ambulation Assistance: Independent  Transfer Assistance: Independent    Active : Yes  Occupation: Retired  Additional Comments: IND with use of SC for mobility,  is not physically able to assist    SUBJECTIVE: RN okayed OT session.  Pt. Pleasant seated in chair and agreeable to participate.     PAIN: 7/10: low back    Vitals: Nurse checked vitals prior to 
Transport arrived for patient to go to . No questions at this time. Patient stable condition. Alert and oriented and breathing with ease. Patient has no questions at his time patient is aware of plan of care. Patient transferred to 8 k in cot with home walker and released with transport for admission. Tried to call report to  no answer.     
Transport request at this time for 8k27  
33.39  ADL Inpatient CMS 0-100% Score: 59.67    Activity Tolerance:  Patient tolerance of  treatment: Fair treatment tolerance      Assessment:  Assessment: Sadia Gomez is a 74 y.o.female that presents with above new performance deficits secondary to lumbar stenosis with neurogenic claudication requiring PLIF. Pt is requiring increased assistance for ADLs, functional mobility, ADL transfers compared to baseline level of function. Skilled OT services is warranted to improve above performance deficits and progress pt towards PLOF. Without OT pt is at risk for falls, further decline in functional abilities, increased caregiver burden, increased risk for medical complication as a result of reduce mobility and inability to return to prior level of living.      Performance deficits / Impairments: Decreased functional mobility , Decreased ADL status, Decreased endurance, Decreased strength, Decreased safe awareness, Decreased balance, Decreased coordination, Decreased high-level IADLs  Prognosis: Fair  REQUIRES OT FOLLOW-UP: Yes  Decision Making: High Complexity    Treatment Initiated: Treatment and education initiated within context of evaluation.  Evaluation time included review of current medical information, gathering information related to past medical, social and functional history, completion of standardized testing, formal and informal observation of tasks, assessment of data and development of plan of care and goals.  Treatment time included skilled education and facilitation of tasks to increase safety and independence with ADL's for improved functional independence and quality of life.    Patient Education:          Patient Education  Education Given To: Patient  Education Provided: Role of Therapy;Plan of Care;Transfer Training  Education Method: Demonstration;Verbal  Barriers to Learning: Cognition  Education Outcome: Verbalized understanding;Demonstrated understanding    Plan:  Times Per Week: 5-6x  Times

## 2024-11-18 NOTE — CARE COORDINATION
11/18/24, 9:42 AM EST    Patient goals/plan/ treatment preferences discussed by  and .  Patient goals/plan/ treatment preferences reviewed with patient/ family.  Patient/ family verbalize understanding of discharge plan and are in agreement with goal/plan/treatment preferences.  Understanding was demonstrated using the teach back method.  AVS provided by RN at time of discharge, which includes all necessary medical information pertaining to the patients current course of illness, treatment, post-discharge goals of care, and treatment preferences.     Services At/After Discharge: Nursing service, Inpatient rehab, OT, and PT         Planning IP rehab room 8K27; patient agrees.

## 2024-11-19 ENCOUNTER — APPOINTMENT (OUTPATIENT)
Dept: CT IMAGING | Age: 74
DRG: 560 | End: 2024-11-19
Attending: STUDENT IN AN ORGANIZED HEALTH CARE EDUCATION/TRAINING PROGRAM
Payer: MEDICARE

## 2024-11-19 LAB
ALBUMIN SERPL BCG-MCNC: 3.1 G/DL (ref 3.5–5.1)
ALP SERPL-CCNC: 66 U/L (ref 38–126)
ALT SERPL W/O P-5'-P-CCNC: 6 U/L (ref 11–66)
ANION GAP SERPL CALC-SCNC: 13 MEQ/L (ref 8–16)
AST SERPL-CCNC: 17 U/L (ref 5–40)
BASOPHILS ABSOLUTE: 0 THOU/MM3 (ref 0–0.1)
BASOPHILS NFR BLD AUTO: 0.4 %
BILIRUB SERPL-MCNC: 0.4 MG/DL (ref 0.3–1.2)
BILIRUB UR QL STRIP: NEGATIVE
BUN SERPL-MCNC: 20 MG/DL (ref 7–22)
CALCIUM SERPL-MCNC: 8.8 MG/DL (ref 8.5–10.5)
CHARACTER UR: CLEAR
CHLORIDE SERPL-SCNC: 96 MEQ/L (ref 98–111)
CO2 SERPL-SCNC: 27 MEQ/L (ref 23–33)
COLOR UR: YELLOW
CREAT SERPL-MCNC: 0.7 MG/DL (ref 0.4–1.2)
DEPRECATED RDW RBC AUTO: 46.2 FL (ref 35–45)
EOSINOPHIL NFR BLD AUTO: 2.5 %
EOSINOPHILS ABSOLUTE: 0.2 THOU/MM3 (ref 0–0.4)
ERYTHROCYTE [DISTWIDTH] IN BLOOD BY AUTOMATED COUNT: 13 % (ref 11.5–14.5)
GFR SERPL CREATININE-BSD FRML MDRD: 89 ML/MIN/1.73M2
GLUCOSE BLD STRIP.AUTO-MCNC: 136 MG/DL (ref 70–108)
GLUCOSE SERPL-MCNC: 118 MG/DL (ref 70–108)
GLUCOSE UR QL STRIP.AUTO: 100 MG/DL
HCT VFR BLD AUTO: 30 % (ref 37–47)
HGB BLD-MCNC: 9.8 GM/DL (ref 12–16)
HGB UR QL STRIP.AUTO: NEGATIVE
IMM GRANULOCYTES # BLD AUTO: 0.07 THOU/MM3 (ref 0–0.07)
IMM GRANULOCYTES NFR BLD AUTO: 0.8 %
KETONES UR QL STRIP.AUTO: NEGATIVE
LEUKOCYTE ESTERASE UR QL STRIP.AUTO: NEGATIVE
LYMPHOCYTES ABSOLUTE: 1 THOU/MM3 (ref 1–4.8)
LYMPHOCYTES NFR BLD AUTO: 11.2 %
MAGNESIUM SERPL-MCNC: 1.9 MG/DL (ref 1.6–2.4)
MCH RBC QN AUTO: 32 PG (ref 26–33)
MCHC RBC AUTO-ENTMCNC: 32.7 GM/DL (ref 32.2–35.5)
MCV RBC AUTO: 98 FL (ref 81–99)
MONOCYTES ABSOLUTE: 1.2 THOU/MM3 (ref 0.4–1.3)
MONOCYTES NFR BLD AUTO: 13.8 %
NEUTROPHILS ABSOLUTE: 6.4 THOU/MM3 (ref 1.8–7.7)
NEUTROPHILS NFR BLD AUTO: 71.3 %
NITRITE UR QL STRIP.AUTO: NEGATIVE
NRBC BLD AUTO-RTO: 0 /100 WBC
PH UR STRIP.AUTO: 6 [PH] (ref 5–9)
PLATELET # BLD AUTO: 351 THOU/MM3 (ref 130–400)
PMV BLD AUTO: 10.3 FL (ref 9.4–12.4)
POTASSIUM SERPL-SCNC: 3.5 MEQ/L (ref 3.5–5.2)
PREALB SERPL-MCNC: 11.4 MG/DL (ref 20–40)
PROT SERPL-MCNC: 5.8 G/DL (ref 6.1–8)
PROT UR STRIP.AUTO-MCNC: NEGATIVE MG/DL
RBC # BLD AUTO: 3.06 MILL/MM3 (ref 4.2–5.4)
SODIUM SERPL-SCNC: 136 MEQ/L (ref 135–145)
SP GR UR REFRACT.AUTO: 1.02 (ref 1–1.03)
UROBILINOGEN UR QL STRIP.AUTO: 0.2 EU/DL (ref 0–1)
WBC # BLD AUTO: 9 THOU/MM3 (ref 4.8–10.8)

## 2024-11-19 PROCEDURE — 99232 SBSQ HOSP IP/OBS MODERATE 35: CPT | Performed by: STUDENT IN AN ORGANIZED HEALTH CARE EDUCATION/TRAINING PROGRAM

## 2024-11-19 PROCEDURE — 97530 THERAPEUTIC ACTIVITIES: CPT

## 2024-11-19 PROCEDURE — 83735 ASSAY OF MAGNESIUM: CPT

## 2024-11-19 PROCEDURE — 92523 SPEECH SOUND LANG COMPREHEN: CPT | Performed by: SPEECH-LANGUAGE PATHOLOGIST

## 2024-11-19 PROCEDURE — 97116 GAIT TRAINING THERAPY: CPT

## 2024-11-19 PROCEDURE — 82948 REAGENT STRIP/BLOOD GLUCOSE: CPT

## 2024-11-19 PROCEDURE — 97166 OT EVAL MOD COMPLEX 45 MIN: CPT

## 2024-11-19 PROCEDURE — 74176 CT ABD & PELVIS W/O CONTRAST: CPT

## 2024-11-19 PROCEDURE — 97162 PT EVAL MOD COMPLEX 30 MIN: CPT

## 2024-11-19 PROCEDURE — 84134 ASSAY OF PREALBUMIN: CPT

## 2024-11-19 PROCEDURE — 6370000000 HC RX 637 (ALT 250 FOR IP): Performed by: STUDENT IN AN ORGANIZED HEALTH CARE EDUCATION/TRAINING PROGRAM

## 2024-11-19 PROCEDURE — 97535 SELF CARE MNGMENT TRAINING: CPT

## 2024-11-19 PROCEDURE — 1180000000 HC REHAB R&B

## 2024-11-19 PROCEDURE — 6360000002 HC RX W HCPCS: Performed by: STUDENT IN AN ORGANIZED HEALTH CARE EDUCATION/TRAINING PROGRAM

## 2024-11-19 PROCEDURE — 36415 COLL VENOUS BLD VENIPUNCTURE: CPT

## 2024-11-19 PROCEDURE — 80053 COMPREHEN METABOLIC PANEL: CPT

## 2024-11-19 PROCEDURE — 97110 THERAPEUTIC EXERCISES: CPT

## 2024-11-19 PROCEDURE — 85025 COMPLETE CBC W/AUTO DIFF WBC: CPT

## 2024-11-19 PROCEDURE — 97129 THER IVNTJ 1ST 15 MIN: CPT | Performed by: SPEECH-LANGUAGE PATHOLOGIST

## 2024-11-19 RX ORDER — TRAMADOL HYDROCHLORIDE 50 MG/1
25 TABLET ORAL EVERY 6 HOURS PRN
Status: DISCONTINUED | OUTPATIENT
Start: 2024-11-19 | End: 2024-11-26

## 2024-11-19 RX ORDER — MAGNESIUM HYDROXIDE/ALUMINUM HYDROXICE/SIMETHICONE 120; 1200; 1200 MG/30ML; MG/30ML; MG/30ML
30 SUSPENSION ORAL EVERY 6 HOURS PRN
Status: DISCONTINUED | OUTPATIENT
Start: 2024-11-19 | End: 2024-12-02 | Stop reason: HOSPADM

## 2024-11-19 RX ORDER — ENOXAPARIN SODIUM 100 MG/ML
40 INJECTION SUBCUTANEOUS DAILY
Status: DISCONTINUED | OUTPATIENT
Start: 2024-11-19 | End: 2024-12-02 | Stop reason: HOSPADM

## 2024-11-19 RX ORDER — QUETIAPINE FUMARATE 25 MG/1
25 TABLET, FILM COATED ORAL NIGHTLY PRN
Status: DISCONTINUED | OUTPATIENT
Start: 2024-11-19 | End: 2024-12-02 | Stop reason: HOSPADM

## 2024-11-19 RX ORDER — METAXALONE 800 MG/1
800 TABLET ORAL 3 TIMES DAILY
Status: DISCONTINUED | OUTPATIENT
Start: 2024-11-19 | End: 2024-12-02 | Stop reason: HOSPADM

## 2024-11-19 RX ORDER — ACETAMINOPHEN 500 MG
1000 TABLET ORAL EVERY 8 HOURS SCHEDULED
Status: DISCONTINUED | OUTPATIENT
Start: 2024-11-19 | End: 2024-12-02 | Stop reason: HOSPADM

## 2024-11-19 RX ORDER — TRAMADOL HYDROCHLORIDE 50 MG/1
50 TABLET ORAL EVERY 6 HOURS PRN
Status: DISCONTINUED | OUTPATIENT
Start: 2024-11-19 | End: 2024-11-26

## 2024-11-19 RX ADMIN — BISACODYL 10 MG: 5 TABLET, COATED ORAL at 15:35

## 2024-11-19 RX ADMIN — ACETAMINOPHEN 650 MG: 325 TABLET ORAL at 08:39

## 2024-11-19 RX ADMIN — ACETAMINOPHEN 1000 MG: 325 TABLET ORAL at 21:19

## 2024-11-19 RX ADMIN — BISACODYL 10 MG: 5 TABLET, COATED ORAL at 08:38

## 2024-11-19 RX ADMIN — SENNOSIDES AND DOCUSATE SODIUM 2 TABLET: 50; 8.6 TABLET ORAL at 21:19

## 2024-11-19 RX ADMIN — METAXALONE 800 MG: 800 TABLET ORAL at 18:31

## 2024-11-19 RX ADMIN — ENOXAPARIN SODIUM 40 MG: 100 INJECTION SUBCUTANEOUS at 15:36

## 2024-11-19 RX ADMIN — MICONAZOLE NITRATE: 2 POWDER TOPICAL at 08:42

## 2024-11-19 RX ADMIN — SENNOSIDES AND DOCUSATE SODIUM 2 TABLET: 50; 8.6 TABLET ORAL at 08:39

## 2024-11-19 RX ADMIN — OXYCODONE 10 MG: 5 TABLET ORAL at 03:39

## 2024-11-19 RX ADMIN — QUETIAPINE FUMARATE 25 MG: 25 TABLET ORAL at 18:29

## 2024-11-19 RX ADMIN — METFORMIN HYDROCHLORIDE 1000 MG: 500 TABLET ORAL at 08:38

## 2024-11-19 RX ADMIN — ALOGLIPTIN 12.5 MG: 12.5 TABLET, FILM COATED ORAL at 08:38

## 2024-11-19 RX ADMIN — TRAMADOL HYDROCHLORIDE 50 MG: 50 TABLET ORAL at 18:28

## 2024-11-19 RX ADMIN — ACETAMINOPHEN 650 MG: 325 TABLET ORAL at 02:22

## 2024-11-19 RX ADMIN — MAGNESIUM HYDROXIDE 30 ML: 1200 LIQUID ORAL at 02:34

## 2024-11-19 RX ADMIN — GLIPIZIDE 10 MG: 10 TABLET ORAL at 05:31

## 2024-11-19 RX ADMIN — METAXALONE 800 MG: 800 TABLET ORAL at 15:35

## 2024-11-19 RX ADMIN — PRAVASTATIN SODIUM 20 MG: 20 TABLET ORAL at 08:39

## 2024-11-19 RX ADMIN — ACETAMINOPHEN 1000 MG: 325 TABLET ORAL at 15:35

## 2024-11-19 RX ADMIN — ALUMINUM HYDROXIDE, MAGNESIUM HYDROXIDE, AND SIMETHICONE 30 ML: 200; 200; 20 SUSPENSION ORAL at 02:23

## 2024-11-19 RX ADMIN — Medication 12.5 MG: at 05:31

## 2024-11-19 RX ADMIN — CYCLOBENZAPRINE 5 MG: 10 TABLET, FILM COATED ORAL at 02:20

## 2024-11-19 RX ADMIN — METFORMIN HYDROCHLORIDE 1000 MG: 500 TABLET ORAL at 18:31

## 2024-11-19 RX ADMIN — POLYETHYLENE GLYCOL 3350 17 G: 17 POWDER, FOR SOLUTION ORAL at 08:40

## 2024-11-19 ASSESSMENT — PAIN SCALES - GENERAL
PAINLEVEL_OUTOF10: 10
PAINLEVEL_OUTOF10: 6
PAINLEVEL_OUTOF10: 9
PAINLEVEL_OUTOF10: 10
PAINLEVEL_OUTOF10: 5
PAINLEVEL_OUTOF10: 10
PAINLEVEL_OUTOF10: 8
PAINLEVEL_OUTOF10: 5
PAINLEVEL_OUTOF10: 5
PAINLEVEL_OUTOF10: 8

## 2024-11-19 ASSESSMENT — PAIN - FUNCTIONAL ASSESSMENT
PAIN_FUNCTIONAL_ASSESSMENT: ACTIVITIES ARE NOT PREVENTED

## 2024-11-19 ASSESSMENT — PAIN DESCRIPTION - DESCRIPTORS
DESCRIPTORS: ACHING;CRAMPING
DESCRIPTORS: TIGHTNESS;SQUEEZING
DESCRIPTORS: ACHING;SORE
DESCRIPTORS: ACHING
DESCRIPTORS: ACHING
DESCRIPTORS: ACHING;SORE

## 2024-11-19 ASSESSMENT — PAIN DESCRIPTION - ORIENTATION
ORIENTATION: MID
ORIENTATION: UPPER
ORIENTATION: MID;LOWER
ORIENTATION: ANTERIOR;LOWER
ORIENTATION: MID
ORIENTATION: LOWER;MID
ORIENTATION: MID;LOWER

## 2024-11-19 ASSESSMENT — PAIN DESCRIPTION - LOCATION
LOCATION: ABDOMEN;BACK
LOCATION: ABDOMEN;BACK
LOCATION: ABDOMEN
LOCATION: ABDOMEN
LOCATION: BACK
LOCATION: ABDOMEN;BACK
LOCATION: BREAST
LOCATION: ABDOMEN;BACK

## 2024-11-19 ASSESSMENT — PAIN DESCRIPTION - PAIN TYPE: TYPE: SURGICAL PAIN

## 2024-11-19 NOTE — H&P
Physical Medicine & Rehabilitation   History and Physical    Chief Complaint and Reason for Rehabilitation Admission: Lumbar stenosis with radiculopathy    History of Present Illness:  Sadia Gomez is a 74 y.o. female with PMH significant for HTN, DM, and sleep apnea who was admitted to [facility] on 11/12/2024. She was admitted to acute IPR unit on 11/18/2024 History obtained via: ED documentation, acute care documentation, and patient.     Patient with history of an L2-S1 fusion in 2016.  Patient with history of constant low back pain with radiation of pain into bilateral hips and into the right lower extremity with numbness/tingling and weakness.  Per report, symptoms have been progressively worsening over time.  Patient symptoms aggravated by walking and alleviated with sitting.  Patient reportedly has failed medication management, PT, and injections.  Patient has followed with Dr. Dickinson as an outpatient and ultimately decision made to pursue surgical intervention.     Patient presented to [facility] on [facility]/12/2024 and underwent an elective T10-L2 decompression and fusion extension to T10, instrumentation to T11.  Patient reportedly tolerated procedure well.  Dr. Sullivan consulted for medical management.  Patient cleared for mobility with TLSO.  Therapy evaluated patient.  PM&R was consulted and ultimately patient determined to be a good candidate for an acute IPR stay in order to maximize her functional progress and independence prior to returning home.      On day of admission, patient is seen with son at bedside. Son reports confusion which he states started a couple days ago. He states they adjusted several of her pain medications in acute care hoping that would help. He states that initially after surgery she did not seem this confused but it seems to have worsened over the past couple of days. She states that she will give incorrect answer and she immediately knows that  it is not what she

## 2024-11-19 NOTE — CARE COORDINATION
Pt was agitated, confused and hallucinating today, pt was moved from 8K 27 to 8K 01 for safety and closer to staffed nursing station.

## 2024-11-19 NOTE — DISCHARGE SUMMARY
Discharge Summary        Date of Admission: 11/12/2024  Date of Discharge: 11/18/2024    Admitting Physician: Dr. Parker  Consults: IM     DATE OF PROCEDURE: 11/12/2024     PREOPERATIVE DIAGNOSES:  1.  Prior L2-S1 decompression and fusion  2.  Lumbar degenerative scoliosis  3.  T10-L2 degenerative disc disease with discogenic back pain and leg pain  4.  T10-L2 lumbar stenosis with neurogenic claudication     POSTOPERATIVE DIAGNOSES:  1.  Prior L2-S1 decompression and fusion  2.  Lumbar degenerative scoliosis  3.  T10-L2 degenerative disc disease with discogenic back pain and leg pain  4.  T10-L2 lumbar stenosis with neurogenic claudication     OPERATION PERFORMED:  1.  L2-S1 exploration of fusion  2.  T10-L2 bilateral laminectomy, partial medial facetectomies and foraminotomies of T10, T11, T12, L1, L2, nerve roots   3.  T10-S1 posterior spinal fusion extension using hang connectors  4.  T11-L1 posterior spinal instrumentation, Cortera, Xtant instrumentation; retained instrumentation L2-S1  5.  Use of local autograft bone      SURGEON: Micah Dickinson MD       INDICATIONS:  This is a 74 y.o. female who is status post L2-S1 decompression and fusion done in 2016 with refractory back and leg pain with numbness and weakness from degenerative disc disease and lumbar stenosis from T10-L2.  Patient has failed full conservative therapy including medication management, physical therapy, and epidural steroid injections. Due to the persistence of symptoms and reduction in the ADLs, patient elected surgical treatment. Patient, therefore, understood indications for the surgery as well as its risks, benefits, and alternatives.  These risks include but are not limited to paralysis, infection, hematoma, dural tear, nerve root injury, nonunion, DVT/PE, stroke, MI, death etc.  All questions were answered. Informed consent was obtained.     HOSPITAL COURSE:   The patient was admitted on the above date had the above procedure

## 2024-11-19 NOTE — CONSULTS
Department of Family Practice  Consult Note        Reason for Consult:  Medical management while on the Inpatient Rehab unit.    Requesting Physician:  Dr Dennis    CHIEF COMPLAINT:   The need to continue the intensive time with therapies following the acute hospital stay.    History Obtained From:  patient, EMR    HISTORY OF PRESENT ILLNESS:              The patient is a 74 y.o. female with significant past medical history of       Diagnosis Date    Arthritis     Diabetes mellitus (HCC)     Hypertension     MDRO (multiple drug resistant organisms) resistance     Sleep apnea with use of continuous positive airway pressure (CPAP)       who presents with a histroy of low back pain that eventually led to her going to the OR. She did well post op and when she was felt to be medically stable once again she has come to the Inpatient Rehab Unit to continue the time with therapies prior to a discharge disposition being made.      Past Medical History:        Diagnosis Date    Arthritis     Diabetes mellitus (HCC)     Hypertension     MDRO (multiple drug resistant organisms) resistance     Sleep apnea with use of continuous positive airway pressure (CPAP)      Past Surgical History:        Procedure Laterality Date    BACK SURGERY      FOOT SURGERY Bilateral     KNEE ARTHROPLASTY Bilateral     LUMBAR FUSION N/A 11/12/2024    Thoracic 12-Lumbar 2 Decompression and Fusion Extension to Thoraci 10 performed by Micah Dickinson MD at [facility] OR    SPINAL CORD DECOMPRESSION  5/20/16    L2-S1--Dr Dickinson    SPINAL FUSION  5/20/16    L2-S1--Dr Dickinson     Current Medications:   Current Facility-Administered Medications: aluminum & magnesium hydroxide-simethicone (MAALOX) 200-200-20 MG/5ML suspension 30 mL, 30 mL, Oral, Q6H PRN  metaxalone (SKELAXIN) tablet 800 mg, 800 mg, Oral, TID  acetaminophen (TYLENOL) tablet 1,000 mg, 1,000 mg, Oral, 3 times per day  traMADol (ULTRAM) tablet 25 mg, 25 mg, Oral, Q6H PRN **OR** traMADol

## 2024-11-19 NOTE — CARE COORDINATION
Patient c/o pain in abdomen 9/10. Gave 10mg of oxycodone at 10:30 pm.   Patient continue to c/o pain 9/10 and becoming agitated. She is threatening to throw things and making verbal threats to staff.   Ambulate with aide down diallo and back. Tolerated fairly well. Patient became calmer after.   She continue to c/o pain in abdomen and now c/o pain in back 10/10. Gave PRN Tylenol, Flexeril, Oxycodone, Maalox, Milk of Mag as ordered. Tried heat, ice and repositioning. Patient states nothing is helping . Dr Dennis notified. UA and CT of abdomen ordered  0533 Pain rates 7/10 in lower back. Denies pain in abdomen. Resting in bed,awake, quiet.   Became confused at times during the shift. Patient awake whole shift.

## 2024-11-19 NOTE — FLOWSHEET NOTE
Patient c/o pain in abdomen 9/10. Gave 10mg of oxycodone at 10:30 pm. At this current time patient is continuing to c/o pain 9/10 and becoming agitated. She is threatening to throw things and making verbal threats to staff.

## 2024-11-20 PROBLEM — E44.0 MODERATE MALNUTRITION (HCC): Status: ACTIVE | Noted: 2024-11-20

## 2024-11-20 LAB — GLUCOSE BLD STRIP.AUTO-MCNC: 181 MG/DL (ref 70–108)

## 2024-11-20 PROCEDURE — 97130 THER IVNTJ EA ADDL 15 MIN: CPT

## 2024-11-20 PROCEDURE — 97110 THERAPEUTIC EXERCISES: CPT

## 2024-11-20 PROCEDURE — 97530 THERAPEUTIC ACTIVITIES: CPT

## 2024-11-20 PROCEDURE — 1180000000 HC REHAB R&B

## 2024-11-20 PROCEDURE — 99232 SBSQ HOSP IP/OBS MODERATE 35: CPT | Performed by: STUDENT IN AN ORGANIZED HEALTH CARE EDUCATION/TRAINING PROGRAM

## 2024-11-20 PROCEDURE — 6370000000 HC RX 637 (ALT 250 FOR IP): Performed by: STUDENT IN AN ORGANIZED HEALTH CARE EDUCATION/TRAINING PROGRAM

## 2024-11-20 PROCEDURE — 6360000002 HC RX W HCPCS: Performed by: STUDENT IN AN ORGANIZED HEALTH CARE EDUCATION/TRAINING PROGRAM

## 2024-11-20 PROCEDURE — 97129 THER IVNTJ 1ST 15 MIN: CPT

## 2024-11-20 PROCEDURE — 82948 REAGENT STRIP/BLOOD GLUCOSE: CPT

## 2024-11-20 PROCEDURE — 97116 GAIT TRAINING THERAPY: CPT

## 2024-11-20 PROCEDURE — 97535 SELF CARE MNGMENT TRAINING: CPT

## 2024-11-20 RX ADMIN — TRAMADOL HYDROCHLORIDE 50 MG: 50 TABLET ORAL at 00:24

## 2024-11-20 RX ADMIN — METAXALONE 800 MG: 800 TABLET ORAL at 19:47

## 2024-11-20 RX ADMIN — BISACODYL 10 MG: 5 TABLET, COATED ORAL at 19:15

## 2024-11-20 RX ADMIN — BISACODYL 10 MG: 5 TABLET, COATED ORAL at 08:20

## 2024-11-20 RX ADMIN — POLYETHYLENE GLYCOL 3350 17 G: 17 POWDER, FOR SOLUTION ORAL at 08:20

## 2024-11-20 RX ADMIN — TRAMADOL HYDROCHLORIDE 50 MG: 50 TABLET ORAL at 13:09

## 2024-11-20 RX ADMIN — Medication 12.5 MG: at 05:38

## 2024-11-20 RX ADMIN — GLIPIZIDE 10 MG: 10 TABLET ORAL at 05:38

## 2024-11-20 RX ADMIN — LOSARTAN POTASSIUM 50 MG: 50 TABLET, FILM COATED ORAL at 08:20

## 2024-11-20 RX ADMIN — SENNOSIDES AND DOCUSATE SODIUM 2 TABLET: 50; 8.6 TABLET ORAL at 20:50

## 2024-11-20 RX ADMIN — METAXALONE 800 MG: 800 TABLET ORAL at 14:36

## 2024-11-20 RX ADMIN — METAXALONE 800 MG: 800 TABLET ORAL at 08:20

## 2024-11-20 RX ADMIN — QUETIAPINE FUMARATE 25 MG: 25 TABLET ORAL at 00:42

## 2024-11-20 RX ADMIN — SENNOSIDES AND DOCUSATE SODIUM 2 TABLET: 50; 8.6 TABLET ORAL at 08:20

## 2024-11-20 RX ADMIN — METFORMIN HYDROCHLORIDE 1000 MG: 500 TABLET ORAL at 08:20

## 2024-11-20 RX ADMIN — AMLODIPINE BESYLATE 10 MG: 10 TABLET ORAL at 08:20

## 2024-11-20 RX ADMIN — ALOGLIPTIN 12.5 MG: 12.5 TABLET, FILM COATED ORAL at 08:20

## 2024-11-20 RX ADMIN — MICONAZOLE NITRATE: 2 POWDER TOPICAL at 21:42

## 2024-11-20 RX ADMIN — PRAVASTATIN SODIUM 20 MG: 20 TABLET ORAL at 08:20

## 2024-11-20 RX ADMIN — ACETAMINOPHEN 1000 MG: 325 TABLET ORAL at 13:10

## 2024-11-20 RX ADMIN — MICONAZOLE NITRATE: 2 POWDER TOPICAL at 08:22

## 2024-11-20 RX ADMIN — QUETIAPINE FUMARATE 25 MG: 25 TABLET ORAL at 19:15

## 2024-11-20 RX ADMIN — METFORMIN HYDROCHLORIDE 1000 MG: 500 TABLET ORAL at 19:15

## 2024-11-20 RX ADMIN — TRIAMTERENE AND HYDROCHLOROTHIAZIDE 1 TABLET: 37.5; 25 TABLET ORAL at 08:20

## 2024-11-20 RX ADMIN — ACETAMINOPHEN 1000 MG: 325 TABLET ORAL at 05:38

## 2024-11-20 RX ADMIN — ENOXAPARIN SODIUM 40 MG: 100 INJECTION SUBCUTANEOUS at 08:20

## 2024-11-20 RX ADMIN — TRAMADOL HYDROCHLORIDE 50 MG: 50 TABLET ORAL at 06:53

## 2024-11-20 RX ADMIN — ACETAMINOPHEN 1000 MG: 325 TABLET ORAL at 20:50

## 2024-11-20 ASSESSMENT — PAIN DESCRIPTION - ORIENTATION
ORIENTATION: LOWER;MID
ORIENTATION: MID;LOWER
ORIENTATION: MID;LOWER
ORIENTATION: LEFT
ORIENTATION: LOWER;MID

## 2024-11-20 ASSESSMENT — PAIN - FUNCTIONAL ASSESSMENT
PAIN_FUNCTIONAL_ASSESSMENT: ACTIVITIES ARE NOT PREVENTED
PAIN_FUNCTIONAL_ASSESSMENT: ACTIVITIES ARE NOT PREVENTED
PAIN_FUNCTIONAL_ASSESSMENT: PREVENTS OR INTERFERES SOME ACTIVE ACTIVITIES AND ADLS
PAIN_FUNCTIONAL_ASSESSMENT: ACTIVITIES ARE NOT PREVENTED

## 2024-11-20 ASSESSMENT — PAIN DESCRIPTION - PAIN TYPE: TYPE: ACUTE PAIN

## 2024-11-20 ASSESSMENT — PAIN DESCRIPTION - DESCRIPTORS
DESCRIPTORS: ACHING;SORE
DESCRIPTORS: ACHING;SORE
DESCRIPTORS: ACHING;DISCOMFORT
DESCRIPTORS: ACHING
DESCRIPTORS: ACHING

## 2024-11-20 ASSESSMENT — PAIN SCALES - GENERAL
PAINLEVEL_OUTOF10: 8
PAINLEVEL_OUTOF10: 9
PAINLEVEL_OUTOF10: 6
PAINLEVEL_OUTOF10: 9
PAINLEVEL_OUTOF10: 8
PAINLEVEL_OUTOF10: 4
PAINLEVEL_OUTOF10: 9
PAINLEVEL_OUTOF10: 8
PAINLEVEL_OUTOF10: 8

## 2024-11-20 ASSESSMENT — PAIN DESCRIPTION - LOCATION
LOCATION: BACK
LOCATION: ABDOMEN
LOCATION: BACK
LOCATION: BACK
LOCATION: HIP

## 2024-11-20 ASSESSMENT — PAIN DESCRIPTION - FREQUENCY: FREQUENCY: CONTINUOUS

## 2024-11-20 ASSESSMENT — PAIN DESCRIPTION - ONSET: ONSET: ON-GOING

## 2024-11-20 NOTE — CARE COORDINATION
Pt now has a live sitter, pt has had little hallucinations earlier in the day, pt is using Tylenol, Tramadol and Skelaxin for pain, pt has not been as agitated today as much as yesterday. Pt is resting in bed in the afternoon but not sleeping. Pt became very agitated with sitter when trying to assist pt to bathroom, pt shouted to \"shut up\" a couple times and continued being abrasive when speaking with staff, returned to wheelchair and daughter returned.     Patient educated on how to use incentive spirometer. Patient verbalized understanding and demonstrated proper use. Emphasized importance and usage of device, with coughing and deep breathing every 4 hours while awake.

## 2024-11-20 NOTE — FLOWSHEET NOTE
Patient alert and orient x4, Became confused at times. She is requesting to go home. Anxious about not having a ride home. Reoriented about where she is and why she is here. Reoriented was effective for short periods of time. She set off tele-sitter due to trying to exit bed. Ambulated up to chair due to her wanting to sit up. She is continuously requesting pain medication. Explained to patient that her Tramadol is scheduled every 6 hours, the next time she can have it is at 12:30 am. Tylenol is scheduled which was given a 9:19 pm. Seroquel was given at 6:29 pm. Patient is very agitated at this time 11:45 pm. Reporting back pain 8/10.

## 2024-11-20 NOTE — CARE COORDINATION
Patient agitated, confused, hallucinating and combative during this shift.  Tried exiting bed and chair. Patient talked to daughter on phone which seems to help calm her. Slept for about two hours during this shift. Continues to c/o pain in back.  Daughter notified of bedside .

## 2024-11-21 LAB
ANION GAP SERPL CALC-SCNC: 10 MEQ/L (ref 8–16)
BACTERIA UR CULT: ABNORMAL
BACTERIA UR CULT: ABNORMAL
BASOPHILS ABSOLUTE: 0 THOU/MM3 (ref 0–0.1)
BASOPHILS NFR BLD AUTO: 0.2 %
BUN SERPL-MCNC: 16 MG/DL (ref 7–22)
CALCIUM SERPL-MCNC: 9 MG/DL (ref 8.5–10.5)
CHLORIDE SERPL-SCNC: 101 MEQ/L (ref 98–111)
CO2 SERPL-SCNC: 27 MEQ/L (ref 23–33)
CREAT SERPL-MCNC: 0.6 MG/DL (ref 0.4–1.2)
DEPRECATED RDW RBC AUTO: 45.3 FL (ref 35–45)
EOSINOPHIL NFR BLD AUTO: 1.7 %
EOSINOPHILS ABSOLUTE: 0.1 THOU/MM3 (ref 0–0.4)
ERYTHROCYTE [DISTWIDTH] IN BLOOD BY AUTOMATED COUNT: 12.8 % (ref 11.5–14.5)
GFR SERPL CREATININE-BSD FRML MDRD: > 90 ML/MIN/1.73M2
GLUCOSE BLD STRIP.AUTO-MCNC: 182 MG/DL (ref 70–108)
GLUCOSE SERPL-MCNC: 167 MG/DL (ref 70–108)
HCT VFR BLD AUTO: 31.1 % (ref 37–47)
HGB BLD-MCNC: 10.2 GM/DL (ref 12–16)
IMM GRANULOCYTES # BLD AUTO: 0.14 THOU/MM3 (ref 0–0.07)
IMM GRANULOCYTES NFR BLD AUTO: 1.6 %
LYMPHOCYTES ABSOLUTE: 0.8 THOU/MM3 (ref 1–4.8)
LYMPHOCYTES NFR BLD AUTO: 9.3 %
MCH RBC QN AUTO: 31.8 PG (ref 26–33)
MCHC RBC AUTO-ENTMCNC: 32.8 GM/DL (ref 32.2–35.5)
MCV RBC AUTO: 96.9 FL (ref 81–99)
MONOCYTES ABSOLUTE: 0.9 THOU/MM3 (ref 0.4–1.3)
MONOCYTES NFR BLD AUTO: 10.2 %
NEUTROPHILS ABSOLUTE: 6.7 THOU/MM3 (ref 1.8–7.7)
NEUTROPHILS NFR BLD AUTO: 77 %
NRBC BLD AUTO-RTO: 0 /100 WBC
ORGANISM: ABNORMAL
ORGANISM: ABNORMAL
PLATELET # BLD AUTO: 442 THOU/MM3 (ref 130–400)
PMV BLD AUTO: 9.9 FL (ref 9.4–12.4)
POTASSIUM SERPL-SCNC: 4 MEQ/L (ref 3.5–5.2)
RBC # BLD AUTO: 3.21 MILL/MM3 (ref 4.2–5.4)
SODIUM SERPL-SCNC: 138 MEQ/L (ref 135–145)
WBC # BLD AUTO: 8.7 THOU/MM3 (ref 4.8–10.8)

## 2024-11-21 PROCEDURE — 36415 COLL VENOUS BLD VENIPUNCTURE: CPT

## 2024-11-21 PROCEDURE — 97116 GAIT TRAINING THERAPY: CPT

## 2024-11-21 PROCEDURE — 6370000000 HC RX 637 (ALT 250 FOR IP): Performed by: STUDENT IN AN ORGANIZED HEALTH CARE EDUCATION/TRAINING PROGRAM

## 2024-11-21 PROCEDURE — 6360000002 HC RX W HCPCS: Performed by: STUDENT IN AN ORGANIZED HEALTH CARE EDUCATION/TRAINING PROGRAM

## 2024-11-21 PROCEDURE — 97129 THER IVNTJ 1ST 15 MIN: CPT | Performed by: SPEECH-LANGUAGE PATHOLOGIST

## 2024-11-21 PROCEDURE — 99232 SBSQ HOSP IP/OBS MODERATE 35: CPT | Performed by: STUDENT IN AN ORGANIZED HEALTH CARE EDUCATION/TRAINING PROGRAM

## 2024-11-21 PROCEDURE — 97530 THERAPEUTIC ACTIVITIES: CPT

## 2024-11-21 PROCEDURE — 97130 THER IVNTJ EA ADDL 15 MIN: CPT | Performed by: SPEECH-LANGUAGE PATHOLOGIST

## 2024-11-21 PROCEDURE — 97110 THERAPEUTIC EXERCISES: CPT

## 2024-11-21 PROCEDURE — 80048 BASIC METABOLIC PNL TOTAL CA: CPT

## 2024-11-21 PROCEDURE — 97535 SELF CARE MNGMENT TRAINING: CPT

## 2024-11-21 PROCEDURE — 85025 COMPLETE CBC W/AUTO DIFF WBC: CPT

## 2024-11-21 PROCEDURE — 1180000000 HC REHAB R&B

## 2024-11-21 PROCEDURE — 82948 REAGENT STRIP/BLOOD GLUCOSE: CPT

## 2024-11-21 RX ADMIN — ALOGLIPTIN 12.5 MG: 12.5 TABLET, FILM COATED ORAL at 08:03

## 2024-11-21 RX ADMIN — PRAVASTATIN SODIUM 20 MG: 20 TABLET ORAL at 08:03

## 2024-11-21 RX ADMIN — TRIAMTERENE AND HYDROCHLOROTHIAZIDE 1 TABLET: 37.5; 25 TABLET ORAL at 08:03

## 2024-11-21 RX ADMIN — ACETAMINOPHEN 1000 MG: 325 TABLET ORAL at 22:31

## 2024-11-21 RX ADMIN — ENOXAPARIN SODIUM 40 MG: 100 INJECTION SUBCUTANEOUS at 08:04

## 2024-11-21 RX ADMIN — Medication 12.5 MG: at 05:30

## 2024-11-21 RX ADMIN — METAXALONE 800 MG: 800 TABLET ORAL at 08:03

## 2024-11-21 RX ADMIN — GLIPIZIDE 10 MG: 10 TABLET ORAL at 08:03

## 2024-11-21 RX ADMIN — SENNOSIDES AND DOCUSATE SODIUM 2 TABLET: 50; 8.6 TABLET ORAL at 19:58

## 2024-11-21 RX ADMIN — POLYETHYLENE GLYCOL 3350 17 G: 17 POWDER, FOR SOLUTION ORAL at 08:04

## 2024-11-21 RX ADMIN — METAXALONE 800 MG: 800 TABLET ORAL at 19:58

## 2024-11-21 RX ADMIN — TRAMADOL HYDROCHLORIDE 50 MG: 50 TABLET ORAL at 09:34

## 2024-11-21 RX ADMIN — AMLODIPINE BESYLATE 10 MG: 10 TABLET ORAL at 08:03

## 2024-11-21 RX ADMIN — BISACODYL 10 MG: 5 TABLET, COATED ORAL at 17:17

## 2024-11-21 RX ADMIN — MICONAZOLE NITRATE: 2 POWDER TOPICAL at 19:58

## 2024-11-21 RX ADMIN — QUETIAPINE FUMARATE 25 MG: 25 TABLET ORAL at 19:58

## 2024-11-21 RX ADMIN — ACETAMINOPHEN 1000 MG: 325 TABLET ORAL at 05:31

## 2024-11-21 RX ADMIN — LOSARTAN POTASSIUM 50 MG: 50 TABLET, FILM COATED ORAL at 08:03

## 2024-11-21 RX ADMIN — ACETAMINOPHEN 1000 MG: 325 TABLET ORAL at 15:07

## 2024-11-21 RX ADMIN — TRAMADOL HYDROCHLORIDE 50 MG: 50 TABLET ORAL at 03:54

## 2024-11-21 RX ADMIN — SENNOSIDES AND DOCUSATE SODIUM 2 TABLET: 50; 8.6 TABLET ORAL at 08:03

## 2024-11-21 RX ADMIN — BISACODYL 10 MG: 5 TABLET, COATED ORAL at 08:03

## 2024-11-21 RX ADMIN — METFORMIN HYDROCHLORIDE 1000 MG: 500 TABLET ORAL at 17:17

## 2024-11-21 RX ADMIN — MICONAZOLE NITRATE: 2 POWDER TOPICAL at 08:04

## 2024-11-21 RX ADMIN — METFORMIN HYDROCHLORIDE 1000 MG: 500 TABLET ORAL at 08:03

## 2024-11-21 RX ADMIN — METAXALONE 800 MG: 800 TABLET ORAL at 15:07

## 2024-11-21 RX ADMIN — TRAMADOL HYDROCHLORIDE 50 MG: 50 TABLET ORAL at 18:33

## 2024-11-21 ASSESSMENT — PAIN DESCRIPTION - ORIENTATION
ORIENTATION: POSTERIOR
ORIENTATION: LOWER
ORIENTATION: POSTERIOR
ORIENTATION: LOWER
ORIENTATION: ANTERIOR

## 2024-11-21 ASSESSMENT — PAIN SCALES - GENERAL
PAINLEVEL_OUTOF10: 8
PAINLEVEL_OUTOF10: 8
PAINLEVEL_OUTOF10: 5
PAINLEVEL_OUTOF10: 8
PAINLEVEL_OUTOF10: 3
PAINLEVEL_OUTOF10: 10
PAINLEVEL_OUTOF10: 8
PAINLEVEL_OUTOF10: 0
PAINLEVEL_OUTOF10: 9
PAINLEVEL_OUTOF10: 8

## 2024-11-21 ASSESSMENT — PAIN DESCRIPTION - LOCATION
LOCATION: ABDOMEN
LOCATION: BACK

## 2024-11-21 ASSESSMENT — PAIN - FUNCTIONAL ASSESSMENT
PAIN_FUNCTIONAL_ASSESSMENT: ACTIVITIES ARE NOT PREVENTED
PAIN_FUNCTIONAL_ASSESSMENT: PREVENTS OR INTERFERES SOME ACTIVE ACTIVITIES AND ADLS

## 2024-11-21 ASSESSMENT — PAIN DESCRIPTION - PAIN TYPE
TYPE: ACUTE PAIN
TYPE: SURGICAL PAIN;ACUTE PAIN

## 2024-11-21 ASSESSMENT — PAIN DESCRIPTION - DESCRIPTORS
DESCRIPTORS: SORE
DESCRIPTORS: ACHING
DESCRIPTORS: ACHING;DISCOMFORT

## 2024-11-21 ASSESSMENT — PAIN DESCRIPTION - FREQUENCY: FREQUENCY: CONTINUOUS

## 2024-11-21 NOTE — CARE COORDINATION
Up to the BSC with corset brace and back to bed. Has quite a bit of pain when she gets up and needs lots of cues. Daughter remains here tonight along with a live sitter. Her Lt leg appears to be weak and like it could buckle so we are using 2 assist to get her out of bed. Once she is back in bed she seems to be able to settle in and get back to sleep. Resting quietly in bed @ this time.

## 2024-11-22 LAB — GLUCOSE BLD STRIP.AUTO-MCNC: 167 MG/DL (ref 70–108)

## 2024-11-22 PROCEDURE — 6360000002 HC RX W HCPCS: Performed by: STUDENT IN AN ORGANIZED HEALTH CARE EDUCATION/TRAINING PROGRAM

## 2024-11-22 PROCEDURE — 6370000000 HC RX 637 (ALT 250 FOR IP): Performed by: STUDENT IN AN ORGANIZED HEALTH CARE EDUCATION/TRAINING PROGRAM

## 2024-11-22 PROCEDURE — 97110 THERAPEUTIC EXERCISES: CPT

## 2024-11-22 PROCEDURE — 97130 THER IVNTJ EA ADDL 15 MIN: CPT | Performed by: SPEECH-LANGUAGE PATHOLOGIST

## 2024-11-22 PROCEDURE — 97116 GAIT TRAINING THERAPY: CPT

## 2024-11-22 PROCEDURE — 1180000000 HC REHAB R&B

## 2024-11-22 PROCEDURE — 97535 SELF CARE MNGMENT TRAINING: CPT

## 2024-11-22 PROCEDURE — 97530 THERAPEUTIC ACTIVITIES: CPT

## 2024-11-22 PROCEDURE — 99232 SBSQ HOSP IP/OBS MODERATE 35: CPT | Performed by: STUDENT IN AN ORGANIZED HEALTH CARE EDUCATION/TRAINING PROGRAM

## 2024-11-22 PROCEDURE — 97129 THER IVNTJ 1ST 15 MIN: CPT | Performed by: SPEECH-LANGUAGE PATHOLOGIST

## 2024-11-22 PROCEDURE — 82948 REAGENT STRIP/BLOOD GLUCOSE: CPT

## 2024-11-22 RX ADMIN — ACETAMINOPHEN 1000 MG: 325 TABLET ORAL at 06:35

## 2024-11-22 RX ADMIN — TRAMADOL HYDROCHLORIDE 50 MG: 50 TABLET ORAL at 10:58

## 2024-11-22 RX ADMIN — METAXALONE 800 MG: 800 TABLET ORAL at 15:28

## 2024-11-22 RX ADMIN — MICONAZOLE NITRATE: 2 POWDER TOPICAL at 09:34

## 2024-11-22 RX ADMIN — METAXALONE 800 MG: 800 TABLET ORAL at 21:00

## 2024-11-22 RX ADMIN — TRAMADOL HYDROCHLORIDE 50 MG: 50 TABLET ORAL at 17:58

## 2024-11-22 RX ADMIN — SENNOSIDES AND DOCUSATE SODIUM 2 TABLET: 50; 8.6 TABLET ORAL at 21:00

## 2024-11-22 RX ADMIN — PRAVASTATIN SODIUM 20 MG: 20 TABLET ORAL at 09:32

## 2024-11-22 RX ADMIN — TRIAMTERENE AND HYDROCHLOROTHIAZIDE 1 TABLET: 37.5; 25 TABLET ORAL at 09:32

## 2024-11-22 RX ADMIN — TRAMADOL HYDROCHLORIDE 50 MG: 50 TABLET ORAL at 00:57

## 2024-11-22 RX ADMIN — ACETAMINOPHEN 1000 MG: 325 TABLET ORAL at 15:28

## 2024-11-22 RX ADMIN — MICONAZOLE NITRATE: 2 POWDER TOPICAL at 21:01

## 2024-11-22 RX ADMIN — AMLODIPINE BESYLATE 10 MG: 10 TABLET ORAL at 09:32

## 2024-11-22 RX ADMIN — METFORMIN HYDROCHLORIDE 1000 MG: 500 TABLET ORAL at 17:59

## 2024-11-22 RX ADMIN — ACETAMINOPHEN 1000 MG: 325 TABLET ORAL at 21:00

## 2024-11-22 RX ADMIN — ALOGLIPTIN 12.5 MG: 12.5 TABLET, FILM COATED ORAL at 09:32

## 2024-11-22 RX ADMIN — SENNOSIDES AND DOCUSATE SODIUM 1 TABLET: 50; 8.6 TABLET ORAL at 09:33

## 2024-11-22 RX ADMIN — ENOXAPARIN SODIUM 40 MG: 100 INJECTION SUBCUTANEOUS at 09:41

## 2024-11-22 RX ADMIN — LOSARTAN POTASSIUM 50 MG: 50 TABLET, FILM COATED ORAL at 09:32

## 2024-11-22 RX ADMIN — METAXALONE 800 MG: 800 TABLET ORAL at 09:32

## 2024-11-22 RX ADMIN — Medication 12.5 MG: at 06:35

## 2024-11-22 RX ADMIN — METFORMIN HYDROCHLORIDE 1000 MG: 500 TABLET ORAL at 09:32

## 2024-11-22 RX ADMIN — POLYETHYLENE GLYCOL 3350 17 G: 17 POWDER, FOR SOLUTION ORAL at 09:33

## 2024-11-22 RX ADMIN — GLIPIZIDE 10 MG: 10 TABLET ORAL at 09:33

## 2024-11-22 ASSESSMENT — PAIN DESCRIPTION - ORIENTATION
ORIENTATION: LOWER
ORIENTATION: MID;LOWER
ORIENTATION: LOWER

## 2024-11-22 ASSESSMENT — PAIN - FUNCTIONAL ASSESSMENT
PAIN_FUNCTIONAL_ASSESSMENT: PREVENTS OR INTERFERES SOME ACTIVE ACTIVITIES AND ADLS
PAIN_FUNCTIONAL_ASSESSMENT: PREVENTS OR INTERFERES SOME ACTIVE ACTIVITIES AND ADLS

## 2024-11-22 ASSESSMENT — PAIN SCALES - GENERAL
PAINLEVEL_OUTOF10: 8
PAINLEVEL_OUTOF10: 2
PAINLEVEL_OUTOF10: 6
PAINLEVEL_OUTOF10: 9
PAINLEVEL_OUTOF10: 2
PAINLEVEL_OUTOF10: 10
PAINLEVEL_OUTOF10: 2
PAINLEVEL_OUTOF10: 7

## 2024-11-22 ASSESSMENT — PAIN DESCRIPTION - DESCRIPTORS
DESCRIPTORS: ACHING;SORE
DESCRIPTORS: ACHING;SORE
DESCRIPTORS: ACHING
DESCRIPTORS: SHARP;ACHING
DESCRIPTORS: ACHING;SORE
DESCRIPTORS: ACHING;DISCOMFORT
DESCRIPTORS: SORE

## 2024-11-22 ASSESSMENT — PAIN DESCRIPTION - LOCATION
LOCATION: BACK
LOCATION: INCISION;BACK
LOCATION: BACK

## 2024-11-23 LAB
ANION GAP SERPL CALC-SCNC: 11 MEQ/L (ref 8–16)
BASOPHILS ABSOLUTE: 0 THOU/MM3 (ref 0–0.1)
BASOPHILS NFR BLD AUTO: 0.6 %
BUN SERPL-MCNC: 17 MG/DL (ref 7–22)
CALCIUM SERPL-MCNC: 8.9 MG/DL (ref 8.5–10.5)
CHLORIDE SERPL-SCNC: 100 MEQ/L (ref 98–111)
CO2 SERPL-SCNC: 24 MEQ/L (ref 23–33)
CREAT SERPL-MCNC: 0.7 MG/DL (ref 0.4–1.2)
DEPRECATED RDW RBC AUTO: 46.5 FL (ref 35–45)
EOSINOPHIL NFR BLD AUTO: 1.8 %
EOSINOPHILS ABSOLUTE: 0.1 THOU/MM3 (ref 0–0.4)
ERYTHROCYTE [DISTWIDTH] IN BLOOD BY AUTOMATED COUNT: 12.9 % (ref 11.5–14.5)
GFR SERPL CREATININE-BSD FRML MDRD: 89 ML/MIN/1.73M2
GLUCOSE BLD STRIP.AUTO-MCNC: 145 MG/DL (ref 70–108)
GLUCOSE SERPL-MCNC: 146 MG/DL (ref 70–108)
HCT VFR BLD AUTO: 31.3 % (ref 37–47)
HGB BLD-MCNC: 10.2 GM/DL (ref 12–16)
IMM GRANULOCYTES # BLD AUTO: 0.37 THOU/MM3 (ref 0–0.07)
IMM GRANULOCYTES NFR BLD AUTO: 4.7 %
LYMPHOCYTES ABSOLUTE: 1.1 THOU/MM3 (ref 1–4.8)
LYMPHOCYTES NFR BLD AUTO: 14.3 %
MCH RBC QN AUTO: 31.9 PG (ref 26–33)
MCHC RBC AUTO-ENTMCNC: 32.6 GM/DL (ref 32.2–35.5)
MCV RBC AUTO: 97.8 FL (ref 81–99)
MONOCYTES ABSOLUTE: 0.8 THOU/MM3 (ref 0.4–1.3)
MONOCYTES NFR BLD AUTO: 10.8 %
NEUTROPHILS ABSOLUTE: 5.3 THOU/MM3 (ref 1.8–7.7)
NEUTROPHILS NFR BLD AUTO: 67.8 %
NRBC BLD AUTO-RTO: 0 /100 WBC
PLATELET # BLD AUTO: 466 THOU/MM3 (ref 130–400)
PMV BLD AUTO: 9.7 FL (ref 9.4–12.4)
POTASSIUM SERPL-SCNC: 4.5 MEQ/L (ref 3.5–5.2)
RBC # BLD AUTO: 3.2 MILL/MM3 (ref 4.2–5.4)
SODIUM SERPL-SCNC: 135 MEQ/L (ref 135–145)
WBC # BLD AUTO: 7.8 THOU/MM3 (ref 4.8–10.8)

## 2024-11-23 PROCEDURE — 85025 COMPLETE CBC W/AUTO DIFF WBC: CPT

## 2024-11-23 PROCEDURE — 97129 THER IVNTJ 1ST 15 MIN: CPT

## 2024-11-23 PROCEDURE — 97530 THERAPEUTIC ACTIVITIES: CPT

## 2024-11-23 PROCEDURE — 80048 BASIC METABOLIC PNL TOTAL CA: CPT

## 2024-11-23 PROCEDURE — 97110 THERAPEUTIC EXERCISES: CPT

## 2024-11-23 PROCEDURE — 97535 SELF CARE MNGMENT TRAINING: CPT

## 2024-11-23 PROCEDURE — 36415 COLL VENOUS BLD VENIPUNCTURE: CPT

## 2024-11-23 PROCEDURE — 97130 THER IVNTJ EA ADDL 15 MIN: CPT

## 2024-11-23 PROCEDURE — 6370000000 HC RX 637 (ALT 250 FOR IP): Performed by: STUDENT IN AN ORGANIZED HEALTH CARE EDUCATION/TRAINING PROGRAM

## 2024-11-23 PROCEDURE — 82948 REAGENT STRIP/BLOOD GLUCOSE: CPT

## 2024-11-23 PROCEDURE — 1180000000 HC REHAB R&B

## 2024-11-23 PROCEDURE — 99232 SBSQ HOSP IP/OBS MODERATE 35: CPT | Performed by: STUDENT IN AN ORGANIZED HEALTH CARE EDUCATION/TRAINING PROGRAM

## 2024-11-23 PROCEDURE — 6360000002 HC RX W HCPCS: Performed by: STUDENT IN AN ORGANIZED HEALTH CARE EDUCATION/TRAINING PROGRAM

## 2024-11-23 PROCEDURE — 97116 GAIT TRAINING THERAPY: CPT

## 2024-11-23 PROCEDURE — 97112 NEUROMUSCULAR REEDUCATION: CPT

## 2024-11-23 RX ORDER — SENNA AND DOCUSATE SODIUM 50; 8.6 MG/1; MG/1
1 TABLET, FILM COATED ORAL 2 TIMES DAILY
Status: DISCONTINUED | OUTPATIENT
Start: 2024-11-23 | End: 2024-12-02 | Stop reason: HOSPADM

## 2024-11-23 RX ADMIN — METFORMIN HYDROCHLORIDE 1000 MG: 500 TABLET ORAL at 17:40

## 2024-11-23 RX ADMIN — GLIPIZIDE 10 MG: 10 TABLET ORAL at 09:14

## 2024-11-23 RX ADMIN — TRIAMTERENE AND HYDROCHLOROTHIAZIDE 1 TABLET: 37.5; 25 TABLET ORAL at 09:14

## 2024-11-23 RX ADMIN — LOSARTAN POTASSIUM 50 MG: 50 TABLET, FILM COATED ORAL at 09:14

## 2024-11-23 RX ADMIN — METAXALONE 800 MG: 800 TABLET ORAL at 21:52

## 2024-11-23 RX ADMIN — ACETAMINOPHEN 1000 MG: 325 TABLET ORAL at 14:33

## 2024-11-23 RX ADMIN — TRAMADOL HYDROCHLORIDE 50 MG: 50 TABLET ORAL at 02:11

## 2024-11-23 RX ADMIN — ENOXAPARIN SODIUM 40 MG: 100 INJECTION SUBCUTANEOUS at 09:14

## 2024-11-23 RX ADMIN — QUETIAPINE FUMARATE 25 MG: 25 TABLET ORAL at 21:52

## 2024-11-23 RX ADMIN — Medication 12.5 MG: at 05:43

## 2024-11-23 RX ADMIN — AMLODIPINE BESYLATE 10 MG: 10 TABLET ORAL at 09:14

## 2024-11-23 RX ADMIN — ACETAMINOPHEN 1000 MG: 325 TABLET ORAL at 21:52

## 2024-11-23 RX ADMIN — PRAVASTATIN SODIUM 20 MG: 20 TABLET ORAL at 09:14

## 2024-11-23 RX ADMIN — SENNOSIDES AND DOCUSATE SODIUM 1 TABLET: 50; 8.6 TABLET ORAL at 21:52

## 2024-11-23 RX ADMIN — METAXALONE 800 MG: 800 TABLET ORAL at 14:33

## 2024-11-23 RX ADMIN — TRAMADOL HYDROCHLORIDE 50 MG: 50 TABLET ORAL at 08:18

## 2024-11-23 RX ADMIN — METAXALONE 800 MG: 800 TABLET ORAL at 09:14

## 2024-11-23 RX ADMIN — TRAMADOL HYDROCHLORIDE 50 MG: 50 TABLET ORAL at 17:39

## 2024-11-23 RX ADMIN — POLYETHYLENE GLYCOL 3350 17 G: 17 POWDER, FOR SOLUTION ORAL at 09:15

## 2024-11-23 RX ADMIN — MICONAZOLE NITRATE: 2 POWDER TOPICAL at 09:15

## 2024-11-23 RX ADMIN — METFORMIN HYDROCHLORIDE 1000 MG: 500 TABLET ORAL at 09:14

## 2024-11-23 RX ADMIN — ACETAMINOPHEN 1000 MG: 325 TABLET ORAL at 05:43

## 2024-11-23 RX ADMIN — ALOGLIPTIN 12.5 MG: 12.5 TABLET, FILM COATED ORAL at 09:14

## 2024-11-23 ASSESSMENT — PAIN SCALES - GENERAL
PAINLEVEL_OUTOF10: 10
PAINLEVEL_OUTOF10: 5
PAINLEVEL_OUTOF10: 6
PAINLEVEL_OUTOF10: 8
PAINLEVEL_OUTOF10: 8
PAINLEVEL_OUTOF10: 10
PAINLEVEL_OUTOF10: 7
PAINLEVEL_OUTOF10: 10
PAINLEVEL_OUTOF10: 5
PAINLEVEL_OUTOF10: 8
PAINLEVEL_OUTOF10: 5

## 2024-11-23 ASSESSMENT — PAIN DESCRIPTION - DESCRIPTORS
DESCRIPTORS: ACHING;DISCOMFORT
DESCRIPTORS: ACHING;SORE
DESCRIPTORS: ACHING;SORE
DESCRIPTORS: ACHING
DESCRIPTORS: ACHING;DISCOMFORT
DESCRIPTORS: ACHING;SORE

## 2024-11-23 ASSESSMENT — PAIN DESCRIPTION - ORIENTATION
ORIENTATION: LOWER
ORIENTATION: RIGHT
ORIENTATION: RIGHT
ORIENTATION: LOWER

## 2024-11-23 ASSESSMENT — PAIN DESCRIPTION - LOCATION
LOCATION: BACK
LOCATION: INCISION;HIP;LEG
LOCATION: BACK
LOCATION: HIP;INCISION;LEG
LOCATION: BACK
LOCATION: BACK

## 2024-11-23 ASSESSMENT — PAIN SCALES - WONG BAKER: WONGBAKER_NUMERICALRESPONSE: HURTS A LITTLE BIT

## 2024-11-23 ASSESSMENT — PAIN - FUNCTIONAL ASSESSMENT: PAIN_FUNCTIONAL_ASSESSMENT: PREVENTS OR INTERFERES SOME ACTIVE ACTIVITIES AND ADLS

## 2024-11-24 LAB — GLUCOSE BLD STRIP.AUTO-MCNC: 137 MG/DL (ref 70–108)

## 2024-11-24 PROCEDURE — 82948 REAGENT STRIP/BLOOD GLUCOSE: CPT

## 2024-11-24 PROCEDURE — 6360000002 HC RX W HCPCS: Performed by: STUDENT IN AN ORGANIZED HEALTH CARE EDUCATION/TRAINING PROGRAM

## 2024-11-24 PROCEDURE — 1180000000 HC REHAB R&B

## 2024-11-24 PROCEDURE — 6370000000 HC RX 637 (ALT 250 FOR IP): Performed by: STUDENT IN AN ORGANIZED HEALTH CARE EDUCATION/TRAINING PROGRAM

## 2024-11-24 RX ADMIN — AMLODIPINE BESYLATE 10 MG: 10 TABLET ORAL at 07:48

## 2024-11-24 RX ADMIN — SENNOSIDES AND DOCUSATE SODIUM 1 TABLET: 50; 8.6 TABLET ORAL at 07:48

## 2024-11-24 RX ADMIN — TRIAMTERENE AND HYDROCHLOROTHIAZIDE 1 TABLET: 37.5; 25 TABLET ORAL at 07:48

## 2024-11-24 RX ADMIN — METAXALONE 800 MG: 800 TABLET ORAL at 13:42

## 2024-11-24 RX ADMIN — ACETAMINOPHEN 1000 MG: 325 TABLET ORAL at 13:42

## 2024-11-24 RX ADMIN — SENNOSIDES AND DOCUSATE SODIUM 1 TABLET: 50; 8.6 TABLET ORAL at 21:44

## 2024-11-24 RX ADMIN — METAXALONE 800 MG: 800 TABLET ORAL at 21:44

## 2024-11-24 RX ADMIN — MICONAZOLE NITRATE: 2 POWDER TOPICAL at 07:49

## 2024-11-24 RX ADMIN — ACETAMINOPHEN 1000 MG: 325 TABLET ORAL at 06:29

## 2024-11-24 RX ADMIN — TRAMADOL HYDROCHLORIDE 50 MG: 50 TABLET ORAL at 16:03

## 2024-11-24 RX ADMIN — POLYETHYLENE GLYCOL 3350 17 G: 17 POWDER, FOR SOLUTION ORAL at 07:48

## 2024-11-24 RX ADMIN — METFORMIN HYDROCHLORIDE 1000 MG: 500 TABLET ORAL at 07:48

## 2024-11-24 RX ADMIN — METAXALONE 800 MG: 800 TABLET ORAL at 07:48

## 2024-11-24 RX ADMIN — ALOGLIPTIN 12.5 MG: 12.5 TABLET, FILM COATED ORAL at 07:48

## 2024-11-24 RX ADMIN — PRAVASTATIN SODIUM 20 MG: 20 TABLET ORAL at 07:48

## 2024-11-24 RX ADMIN — TRAMADOL HYDROCHLORIDE 50 MG: 50 TABLET ORAL at 07:48

## 2024-11-24 RX ADMIN — GLIPIZIDE 10 MG: 10 TABLET ORAL at 07:48

## 2024-11-24 RX ADMIN — TRAMADOL HYDROCHLORIDE 50 MG: 50 TABLET ORAL at 00:13

## 2024-11-24 RX ADMIN — Medication 12.5 MG: at 06:29

## 2024-11-24 RX ADMIN — METFORMIN HYDROCHLORIDE 1000 MG: 500 TABLET ORAL at 16:03

## 2024-11-24 RX ADMIN — QUETIAPINE FUMARATE 25 MG: 25 TABLET ORAL at 21:44

## 2024-11-24 RX ADMIN — ACETAMINOPHEN 1000 MG: 325 TABLET ORAL at 21:44

## 2024-11-24 RX ADMIN — ENOXAPARIN SODIUM 40 MG: 100 INJECTION SUBCUTANEOUS at 07:48

## 2024-11-24 RX ADMIN — LOSARTAN POTASSIUM 50 MG: 50 TABLET, FILM COATED ORAL at 07:48

## 2024-11-24 ASSESSMENT — PAIN DESCRIPTION - ORIENTATION
ORIENTATION: LOWER
ORIENTATION: RIGHT;LOWER

## 2024-11-24 ASSESSMENT — PAIN SCALES - GENERAL
PAINLEVEL_OUTOF10: 9
PAINLEVEL_OUTOF10: 10
PAINLEVEL_OUTOF10: 10
PAINLEVEL_OUTOF10: 8
PAINLEVEL_OUTOF10: 10
PAINLEVEL_OUTOF10: 0
PAINLEVEL_OUTOF10: 10
PAINLEVEL_OUTOF10: 6
PAINLEVEL_OUTOF10: 5

## 2024-11-24 ASSESSMENT — PAIN DESCRIPTION - LOCATION
LOCATION: BACK
LOCATION: BACK;HIP
LOCATION: BACK
LOCATION: BACK

## 2024-11-24 ASSESSMENT — PAIN DESCRIPTION - DESCRIPTORS
DESCRIPTORS: ACHING
DESCRIPTORS: ACHING;SHARP
DESCRIPTORS: ACHING;HEAVINESS;CRUSHING
DESCRIPTORS: ACHING;SORE

## 2024-11-24 ASSESSMENT — PAIN - FUNCTIONAL ASSESSMENT
PAIN_FUNCTIONAL_ASSESSMENT: PREVENTS OR INTERFERES SOME ACTIVE ACTIVITIES AND ADLS

## 2024-11-24 NOTE — CARE COORDINATION
Alert and orient x4. Agitated beginning of shift. Continue to c/o pain in lower back. PRN pain medication given. Assisted to commode, tolerated fairly well. Refuses to reposition every 2 hours due to increase pain when moving. Patient slept fairly well this shift.

## 2024-11-24 NOTE — CARE COORDINATION
Pt has had the day off from therapy today.  She has been going back and forth from her chair to the bed.  She did go visit her  on another unit today with the sitter.  She has complained of pain and stated that she does not want to try any non medication pain relief she only wanted pain meds.  She did tell the tech that if we don't get to her fast enough when she wants something than she will put herself on the floor.  She also demanded that the tech get her some fresh ice water right away and if she did not then she would start to throw items in the room. She was redirected and at that point did not throw anything .    She is complaining of pain to her right side around her hip area.  She states that it feels like there is a stone on it and someone is pressing down.  She has gotten both her scheduled and PRN pain medications.

## 2024-11-25 LAB — GLUCOSE BLD STRIP.AUTO-MCNC: 131 MG/DL (ref 70–108)

## 2024-11-25 PROCEDURE — 99232 SBSQ HOSP IP/OBS MODERATE 35: CPT | Performed by: STUDENT IN AN ORGANIZED HEALTH CARE EDUCATION/TRAINING PROGRAM

## 2024-11-25 PROCEDURE — 97530 THERAPEUTIC ACTIVITIES: CPT

## 2024-11-25 PROCEDURE — 97130 THER IVNTJ EA ADDL 15 MIN: CPT

## 2024-11-25 PROCEDURE — 97110 THERAPEUTIC EXERCISES: CPT

## 2024-11-25 PROCEDURE — 97535 SELF CARE MNGMENT TRAINING: CPT

## 2024-11-25 PROCEDURE — 97116 GAIT TRAINING THERAPY: CPT

## 2024-11-25 PROCEDURE — 6360000002 HC RX W HCPCS: Performed by: STUDENT IN AN ORGANIZED HEALTH CARE EDUCATION/TRAINING PROGRAM

## 2024-11-25 PROCEDURE — 1180000000 HC REHAB R&B

## 2024-11-25 PROCEDURE — 82948 REAGENT STRIP/BLOOD GLUCOSE: CPT

## 2024-11-25 PROCEDURE — 97129 THER IVNTJ 1ST 15 MIN: CPT

## 2024-11-25 PROCEDURE — 6370000000 HC RX 637 (ALT 250 FOR IP): Performed by: STUDENT IN AN ORGANIZED HEALTH CARE EDUCATION/TRAINING PROGRAM

## 2024-11-25 RX ADMIN — ACETAMINOPHEN 1000 MG: 325 TABLET ORAL at 06:35

## 2024-11-25 RX ADMIN — Medication 12.5 MG: at 06:35

## 2024-11-25 RX ADMIN — METAXALONE 800 MG: 800 TABLET ORAL at 21:44

## 2024-11-25 RX ADMIN — TRAMADOL HYDROCHLORIDE 50 MG: 50 TABLET ORAL at 02:54

## 2024-11-25 RX ADMIN — ALOGLIPTIN 12.5 MG: 12.5 TABLET, FILM COATED ORAL at 09:09

## 2024-11-25 RX ADMIN — MICONAZOLE NITRATE: 2 POWDER TOPICAL at 21:48

## 2024-11-25 RX ADMIN — GLIPIZIDE 10 MG: 10 TABLET ORAL at 09:09

## 2024-11-25 RX ADMIN — TRAMADOL HYDROCHLORIDE 50 MG: 50 TABLET ORAL at 09:09

## 2024-11-25 RX ADMIN — POLYETHYLENE GLYCOL 3350 17 G: 17 POWDER, FOR SOLUTION ORAL at 09:11

## 2024-11-25 RX ADMIN — LOSARTAN POTASSIUM 50 MG: 50 TABLET, FILM COATED ORAL at 09:09

## 2024-11-25 RX ADMIN — ACETAMINOPHEN 1000 MG: 325 TABLET ORAL at 15:19

## 2024-11-25 RX ADMIN — QUETIAPINE FUMARATE 25 MG: 25 TABLET ORAL at 21:44

## 2024-11-25 RX ADMIN — TRIAMTERENE AND HYDROCHLOROTHIAZIDE 1 TABLET: 37.5; 25 TABLET ORAL at 09:08

## 2024-11-25 RX ADMIN — METFORMIN HYDROCHLORIDE 1000 MG: 500 TABLET ORAL at 09:09

## 2024-11-25 RX ADMIN — SENNOSIDES AND DOCUSATE SODIUM 1 TABLET: 50; 8.6 TABLET ORAL at 09:10

## 2024-11-25 RX ADMIN — METFORMIN HYDROCHLORIDE 1000 MG: 500 TABLET ORAL at 17:45

## 2024-11-25 RX ADMIN — MICONAZOLE NITRATE: 2 POWDER TOPICAL at 09:11

## 2024-11-25 RX ADMIN — METAXALONE 800 MG: 800 TABLET ORAL at 09:08

## 2024-11-25 RX ADMIN — AMLODIPINE BESYLATE 10 MG: 10 TABLET ORAL at 09:10

## 2024-11-25 RX ADMIN — TRAMADOL HYDROCHLORIDE 50 MG: 50 TABLET ORAL at 15:19

## 2024-11-25 RX ADMIN — METAXALONE 800 MG: 800 TABLET ORAL at 15:18

## 2024-11-25 RX ADMIN — ENOXAPARIN SODIUM 40 MG: 100 INJECTION SUBCUTANEOUS at 09:08

## 2024-11-25 RX ADMIN — PRAVASTATIN SODIUM 20 MG: 20 TABLET ORAL at 09:09

## 2024-11-25 RX ADMIN — ACETAMINOPHEN 1000 MG: 325 TABLET ORAL at 21:44

## 2024-11-25 ASSESSMENT — PAIN DESCRIPTION - DESCRIPTORS
DESCRIPTORS: ACHING;SHOOTING;SPASM
DESCRIPTORS: ACHING;SORE
DESCRIPTORS: ACHING;DISCOMFORT
DESCRIPTORS: ACHING;SORE
DESCRIPTORS: ACHING;SORE
DESCRIPTORS: ACHING;SPASM

## 2024-11-25 ASSESSMENT — PAIN SCALES - GENERAL
PAINLEVEL_OUTOF10: 5
PAINLEVEL_OUTOF10: 10
PAINLEVEL_OUTOF10: 5
PAINLEVEL_OUTOF10: 7
PAINLEVEL_OUTOF10: 10

## 2024-11-25 ASSESSMENT — PAIN DESCRIPTION - ORIENTATION
ORIENTATION: LEFT;RIGHT;LOWER
ORIENTATION: LOWER;MID
ORIENTATION: LOWER
ORIENTATION: LOWER
ORIENTATION: RIGHT;LEFT;LOWER
ORIENTATION: RIGHT;LEFT;LOWER

## 2024-11-25 ASSESSMENT — PAIN DESCRIPTION - LOCATION
LOCATION: BACK

## 2024-11-25 ASSESSMENT — PAIN DESCRIPTION - PAIN TYPE: TYPE: CHRONIC PAIN

## 2024-11-25 ASSESSMENT — PAIN DESCRIPTION - FREQUENCY: FREQUENCY: CONTINUOUS

## 2024-11-25 NOTE — CARE COORDINATION
Patient agitated and anxious at beginning of shift. Aide took patient to see  via wheelchair. Continue to complain of pain. States she did not sleep well

## 2024-11-25 NOTE — CARE COORDINATION
Patient has worked therapy today. She is concerned for her  who is also a patient here at this facility. Pain addressed as requested. NO complaints of chest pain or SOB.

## 2024-11-26 LAB — GLUCOSE BLD STRIP.AUTO-MCNC: 183 MG/DL (ref 70–108)

## 2024-11-26 PROCEDURE — 97116 GAIT TRAINING THERAPY: CPT

## 2024-11-26 PROCEDURE — 97110 THERAPEUTIC EXERCISES: CPT

## 2024-11-26 PROCEDURE — 82948 REAGENT STRIP/BLOOD GLUCOSE: CPT

## 2024-11-26 PROCEDURE — 97530 THERAPEUTIC ACTIVITIES: CPT

## 2024-11-26 PROCEDURE — 97129 THER IVNTJ 1ST 15 MIN: CPT

## 2024-11-26 PROCEDURE — 6360000002 HC RX W HCPCS: Performed by: STUDENT IN AN ORGANIZED HEALTH CARE EDUCATION/TRAINING PROGRAM

## 2024-11-26 PROCEDURE — 97535 SELF CARE MNGMENT TRAINING: CPT

## 2024-11-26 PROCEDURE — 1180000000 HC REHAB R&B

## 2024-11-26 PROCEDURE — 99232 SBSQ HOSP IP/OBS MODERATE 35: CPT | Performed by: STUDENT IN AN ORGANIZED HEALTH CARE EDUCATION/TRAINING PROGRAM

## 2024-11-26 PROCEDURE — 6370000000 HC RX 637 (ALT 250 FOR IP): Performed by: STUDENT IN AN ORGANIZED HEALTH CARE EDUCATION/TRAINING PROGRAM

## 2024-11-26 PROCEDURE — 97130 THER IVNTJ EA ADDL 15 MIN: CPT

## 2024-11-26 RX ORDER — TRAMADOL HYDROCHLORIDE 50 MG/1
100 TABLET ORAL EVERY 6 HOURS PRN
Status: DISCONTINUED | OUTPATIENT
Start: 2024-11-26 | End: 2024-12-02 | Stop reason: HOSPADM

## 2024-11-26 RX ORDER — TRAMADOL HYDROCHLORIDE 50 MG/1
50 TABLET ORAL EVERY 6 HOURS PRN
Status: DISCONTINUED | OUTPATIENT
Start: 2024-11-26 | End: 2024-12-02 | Stop reason: HOSPADM

## 2024-11-26 RX ADMIN — METFORMIN HYDROCHLORIDE 1000 MG: 500 TABLET ORAL at 18:15

## 2024-11-26 RX ADMIN — TRAMADOL HYDROCHLORIDE 50 MG: 50 TABLET ORAL at 04:30

## 2024-11-26 RX ADMIN — ACETAMINOPHEN 1000 MG: 325 TABLET ORAL at 14:05

## 2024-11-26 RX ADMIN — MICONAZOLE NITRATE: 2 POWDER TOPICAL at 10:30

## 2024-11-26 RX ADMIN — PRAVASTATIN SODIUM 20 MG: 20 TABLET ORAL at 10:23

## 2024-11-26 RX ADMIN — METAXALONE 800 MG: 800 TABLET ORAL at 14:05

## 2024-11-26 RX ADMIN — ALOGLIPTIN 12.5 MG: 12.5 TABLET, FILM COATED ORAL at 10:23

## 2024-11-26 RX ADMIN — ACETAMINOPHEN 1000 MG: 325 TABLET ORAL at 21:12

## 2024-11-26 RX ADMIN — POLYETHYLENE GLYCOL 3350 17 G: 17 POWDER, FOR SOLUTION ORAL at 10:23

## 2024-11-26 RX ADMIN — LOSARTAN POTASSIUM 50 MG: 50 TABLET, FILM COATED ORAL at 10:23

## 2024-11-26 RX ADMIN — Medication 12.5 MG: at 06:23

## 2024-11-26 RX ADMIN — TRIAMTERENE AND HYDROCHLOROTHIAZIDE 1 TABLET: 37.5; 25 TABLET ORAL at 10:23

## 2024-11-26 RX ADMIN — METAXALONE 800 MG: 800 TABLET ORAL at 10:23

## 2024-11-26 RX ADMIN — ENOXAPARIN SODIUM 40 MG: 100 INJECTION SUBCUTANEOUS at 10:23

## 2024-11-26 RX ADMIN — TRAMADOL HYDROCHLORIDE 100 MG: 50 TABLET ORAL at 10:24

## 2024-11-26 RX ADMIN — TRAMADOL HYDROCHLORIDE 100 MG: 50 TABLET ORAL at 18:13

## 2024-11-26 RX ADMIN — AMLODIPINE BESYLATE 10 MG: 10 TABLET ORAL at 10:23

## 2024-11-26 RX ADMIN — GLIPIZIDE 10 MG: 10 TABLET ORAL at 10:23

## 2024-11-26 RX ADMIN — SENNOSIDES AND DOCUSATE SODIUM 1 TABLET: 50; 8.6 TABLET ORAL at 10:23

## 2024-11-26 RX ADMIN — METFORMIN HYDROCHLORIDE 1000 MG: 500 TABLET ORAL at 10:23

## 2024-11-26 RX ADMIN — ACETAMINOPHEN 1000 MG: 325 TABLET ORAL at 06:23

## 2024-11-26 RX ADMIN — METAXALONE 800 MG: 800 TABLET ORAL at 21:12

## 2024-11-26 RX ADMIN — SENNOSIDES AND DOCUSATE SODIUM 1 TABLET: 50; 8.6 TABLET ORAL at 21:12

## 2024-11-26 RX ADMIN — MICONAZOLE NITRATE: 2 POWDER TOPICAL at 21:30

## 2024-11-26 ASSESSMENT — PAIN DESCRIPTION - ORIENTATION
ORIENTATION: MID;LOWER
ORIENTATION: RIGHT;LOWER
ORIENTATION: RIGHT
ORIENTATION: LOWER;MID
ORIENTATION: LOWER;MID

## 2024-11-26 ASSESSMENT — PAIN SCALES - GENERAL
PAINLEVEL_OUTOF10: 10
PAINLEVEL_OUTOF10: 4
PAINLEVEL_OUTOF10: 10
PAINLEVEL_OUTOF10: 8
PAINLEVEL_OUTOF10: 4
PAINLEVEL_OUTOF10: 10
PAINLEVEL_OUTOF10: 6
PAINLEVEL_OUTOF10: 5
PAINLEVEL_OUTOF10: 5

## 2024-11-26 ASSESSMENT — PAIN DESCRIPTION - DESCRIPTORS
DESCRIPTORS: ACHING
DESCRIPTORS: ACHING;SORE
DESCRIPTORS: STABBING;THROBBING
DESCRIPTORS: ACHING;DISCOMFORT

## 2024-11-26 ASSESSMENT — PAIN DESCRIPTION - LOCATION
LOCATION: BACK
LOCATION: BACK
LOCATION: BACK;HIP
LOCATION: HIP;INCISION;LEG
LOCATION: BACK

## 2024-11-26 NOTE — CARE COORDINATION
Patient calm and cooperative during this shift. Refuse to turn every 2 hours. Education provided. Patient slept well throughout night.

## 2024-11-27 LAB
ANION GAP SERPL CALC-SCNC: 11 MEQ/L (ref 8–16)
BASOPHILS ABSOLUTE: 0 THOU/MM3 (ref 0–0.1)
BASOPHILS NFR BLD AUTO: 0.4 %
BUN SERPL-MCNC: 24 MG/DL (ref 7–22)
CALCIUM SERPL-MCNC: 8.6 MG/DL (ref 8.5–10.5)
CHLORIDE SERPL-SCNC: 100 MEQ/L (ref 98–111)
CO2 SERPL-SCNC: 23 MEQ/L (ref 23–33)
CREAT SERPL-MCNC: 0.8 MG/DL (ref 0.4–1.2)
DEPRECATED RDW RBC AUTO: 47.2 FL (ref 35–45)
EOSINOPHIL NFR BLD AUTO: 1.3 %
EOSINOPHILS ABSOLUTE: 0.1 THOU/MM3 (ref 0–0.4)
ERYTHROCYTE [DISTWIDTH] IN BLOOD BY AUTOMATED COUNT: 13.2 % (ref 11.5–14.5)
GFR SERPL CREATININE-BSD FRML MDRD: 76 ML/MIN/1.73M2
GLUCOSE BLD STRIP.AUTO-MCNC: 151 MG/DL (ref 70–108)
GLUCOSE SERPL-MCNC: 145 MG/DL (ref 70–108)
HCT VFR BLD AUTO: 33.5 % (ref 37–47)
HGB BLD-MCNC: 10.7 GM/DL (ref 12–16)
IMM GRANULOCYTES # BLD AUTO: 0.19 THOU/MM3 (ref 0–0.07)
IMM GRANULOCYTES NFR BLD AUTO: 2.1 %
LYMPHOCYTES ABSOLUTE: 1.2 THOU/MM3 (ref 1–4.8)
LYMPHOCYTES NFR BLD AUTO: 13.2 %
MCH RBC QN AUTO: 31.2 PG (ref 26–33)
MCHC RBC AUTO-ENTMCNC: 31.9 GM/DL (ref 32.2–35.5)
MCV RBC AUTO: 97.7 FL (ref 81–99)
MONOCYTES ABSOLUTE: 0.7 THOU/MM3 (ref 0.4–1.3)
MONOCYTES NFR BLD AUTO: 8.1 %
NEUTROPHILS ABSOLUTE: 6.7 THOU/MM3 (ref 1.8–7.7)
NEUTROPHILS NFR BLD AUTO: 74.9 %
NRBC BLD AUTO-RTO: 0 /100 WBC
PLATELET # BLD AUTO: 499 THOU/MM3 (ref 130–400)
PMV BLD AUTO: 9.3 FL (ref 9.4–12.4)
POTASSIUM SERPL-SCNC: 3.8 MEQ/L (ref 3.5–5.2)
RBC # BLD AUTO: 3.43 MILL/MM3 (ref 4.2–5.4)
SODIUM SERPL-SCNC: 134 MEQ/L (ref 135–145)
WBC # BLD AUTO: 9 THOU/MM3 (ref 4.8–10.8)

## 2024-11-27 PROCEDURE — 97110 THERAPEUTIC EXERCISES: CPT

## 2024-11-27 PROCEDURE — 6360000002 HC RX W HCPCS: Performed by: STUDENT IN AN ORGANIZED HEALTH CARE EDUCATION/TRAINING PROGRAM

## 2024-11-27 PROCEDURE — 97116 GAIT TRAINING THERAPY: CPT

## 2024-11-27 PROCEDURE — 97129 THER IVNTJ 1ST 15 MIN: CPT

## 2024-11-27 PROCEDURE — 36415 COLL VENOUS BLD VENIPUNCTURE: CPT

## 2024-11-27 PROCEDURE — 99232 SBSQ HOSP IP/OBS MODERATE 35: CPT | Performed by: STUDENT IN AN ORGANIZED HEALTH CARE EDUCATION/TRAINING PROGRAM

## 2024-11-27 PROCEDURE — 97130 THER IVNTJ EA ADDL 15 MIN: CPT

## 2024-11-27 PROCEDURE — 82948 REAGENT STRIP/BLOOD GLUCOSE: CPT

## 2024-11-27 PROCEDURE — 97535 SELF CARE MNGMENT TRAINING: CPT

## 2024-11-27 PROCEDURE — 6370000000 HC RX 637 (ALT 250 FOR IP): Performed by: STUDENT IN AN ORGANIZED HEALTH CARE EDUCATION/TRAINING PROGRAM

## 2024-11-27 PROCEDURE — 80048 BASIC METABOLIC PNL TOTAL CA: CPT

## 2024-11-27 PROCEDURE — 97530 THERAPEUTIC ACTIVITIES: CPT

## 2024-11-27 PROCEDURE — 1180000000 HC REHAB R&B

## 2024-11-27 PROCEDURE — 85025 COMPLETE CBC W/AUTO DIFF WBC: CPT

## 2024-11-27 RX ADMIN — SENNOSIDES AND DOCUSATE SODIUM 1 TABLET: 50; 8.6 TABLET ORAL at 22:16

## 2024-11-27 RX ADMIN — TRAMADOL HYDROCHLORIDE 100 MG: 50 TABLET ORAL at 15:56

## 2024-11-27 RX ADMIN — Medication 12.5 MG: at 06:56

## 2024-11-27 RX ADMIN — ACETAMINOPHEN 1000 MG: 325 TABLET ORAL at 13:36

## 2024-11-27 RX ADMIN — TRAMADOL HYDROCHLORIDE 100 MG: 50 TABLET ORAL at 10:07

## 2024-11-27 RX ADMIN — METAXALONE 800 MG: 800 TABLET ORAL at 13:36

## 2024-11-27 RX ADMIN — SENNOSIDES AND DOCUSATE SODIUM 1 TABLET: 50; 8.6 TABLET ORAL at 09:33

## 2024-11-27 RX ADMIN — ACETAMINOPHEN 1000 MG: 325 TABLET ORAL at 22:16

## 2024-11-27 RX ADMIN — PRAVASTATIN SODIUM 20 MG: 20 TABLET ORAL at 09:34

## 2024-11-27 RX ADMIN — AMLODIPINE BESYLATE 10 MG: 10 TABLET ORAL at 09:34

## 2024-11-27 RX ADMIN — METFORMIN HYDROCHLORIDE 1000 MG: 500 TABLET ORAL at 09:33

## 2024-11-27 RX ADMIN — QUETIAPINE FUMARATE 25 MG: 25 TABLET ORAL at 22:16

## 2024-11-27 RX ADMIN — ACETAMINOPHEN 1000 MG: 325 TABLET ORAL at 06:56

## 2024-11-27 RX ADMIN — MICONAZOLE NITRATE: 2 POWDER TOPICAL at 09:34

## 2024-11-27 RX ADMIN — METFORMIN HYDROCHLORIDE 1000 MG: 500 TABLET ORAL at 17:24

## 2024-11-27 RX ADMIN — LOSARTAN POTASSIUM 50 MG: 50 TABLET, FILM COATED ORAL at 09:34

## 2024-11-27 RX ADMIN — POLYETHYLENE GLYCOL 3350 17 G: 17 POWDER, FOR SOLUTION ORAL at 09:33

## 2024-11-27 RX ADMIN — TRIAMTERENE AND HYDROCHLOROTHIAZIDE 1 TABLET: 37.5; 25 TABLET ORAL at 09:34

## 2024-11-27 RX ADMIN — METAXALONE 800 MG: 800 TABLET ORAL at 22:16

## 2024-11-27 RX ADMIN — TRAMADOL HYDROCHLORIDE 50 MG: 50 TABLET ORAL at 02:06

## 2024-11-27 RX ADMIN — ALOGLIPTIN 12.5 MG: 12.5 TABLET, FILM COATED ORAL at 09:33

## 2024-11-27 RX ADMIN — ENOXAPARIN SODIUM 40 MG: 100 INJECTION SUBCUTANEOUS at 09:39

## 2024-11-27 RX ADMIN — METAXALONE 800 MG: 800 TABLET ORAL at 09:33

## 2024-11-27 RX ADMIN — GLIPIZIDE 10 MG: 10 TABLET ORAL at 09:33

## 2024-11-27 ASSESSMENT — PAIN DESCRIPTION - FREQUENCY: FREQUENCY: CONTINUOUS

## 2024-11-27 ASSESSMENT — PAIN SCALES - GENERAL
PAINLEVEL_OUTOF10: 5
PAINLEVEL_OUTOF10: 5
PAINLEVEL_OUTOF10: 0
PAINLEVEL_OUTOF10: 5
PAINLEVEL_OUTOF10: 5
PAINLEVEL_OUTOF10: 8
PAINLEVEL_OUTOF10: 3
PAINLEVEL_OUTOF10: 7

## 2024-11-27 ASSESSMENT — PAIN DESCRIPTION - LOCATION
LOCATION: HIP;LEG;INCISION
LOCATION: BACK;HIP
LOCATION: HIP;INCISION;LEG
LOCATION: BACK

## 2024-11-27 ASSESSMENT — PAIN DESCRIPTION - ORIENTATION
ORIENTATION: RIGHT
ORIENTATION: LOWER
ORIENTATION: MID;LOWER;RIGHT
ORIENTATION: RIGHT

## 2024-11-27 ASSESSMENT — PAIN DESCRIPTION - ONSET: ONSET: ON-GOING

## 2024-11-27 ASSESSMENT — PAIN - FUNCTIONAL ASSESSMENT: PAIN_FUNCTIONAL_ASSESSMENT: PREVENTS OR INTERFERES SOME ACTIVE ACTIVITIES AND ADLS

## 2024-11-27 ASSESSMENT — PAIN DESCRIPTION - PAIN TYPE: TYPE: SURGICAL PAIN

## 2024-11-27 ASSESSMENT — PAIN SCALES - WONG BAKER
WONGBAKER_NUMERICALRESPONSE: HURTS A LITTLE BIT
WONGBAKER_NUMERICALRESPONSE: HURTS A LITTLE BIT

## 2024-11-27 ASSESSMENT — PAIN DESCRIPTION - DESCRIPTORS
DESCRIPTORS: ACHING;DISCOMFORT
DESCRIPTORS: THROBBING
DESCRIPTORS: ACHING;STABBING

## 2024-11-27 NOTE — DISCHARGE INSTR - COC
No Isolation          Patient Infection Status       None to display            Nurse Assessment:  Last Vital Signs: /68   Pulse 97   Temp 98.2 °F (36.8 °C) (Oral)   Resp 18   Ht 1.6 m (5' 3\")   Wt 75 kg (165 lb 5.5 oz)   SpO2 97%   BMI 29.29 kg/m²     Last documented pain score (0-10 scale): Pain level- 5  Last Weight:   Wt Readings from Last 1 Encounters:   12/02/24 75 kg (165 lb 5.5 oz)     Mental Status:  oriented, alert, and dis oriented at times    IV Access:  - None    Nursing Mobility/ADLs:  Walking   Assisted  Transfer  Assisted  Bathing  Assisted  Dressing  Assisted  Toileting  Assisted  Feeding  Independent  Med Admin  Assisted  Med Delivery   whole    Wound Care Documentation and Therapy:  Incision 11/12/24 Back Medial (Active)   Wound Image   11/30/24 1712   Dressing Status Dry;Intact 12/01/24 1915   Dressing Change Due 12/02/24 12/01/24 1915   Incision Cleansed Soap and water 12/01/24 0900   Dressing/Treatment ABD pad;Tape/Soft cloth adhesive tape 12/01/24 1915   Incision Length (cm) 18.5 11/30/24 1712   Incision Width (cm) 0.25 cm 11/30/24 1712   Closure Surgical glue 12/01/24 1915   Margins Approximated 12/01/24 1915   Incision Assessment Dry 12/01/24 1915   Drainage Amount None (dry) 12/01/24 1915   Drainage Description Serosanguinous 11/22/24 2051   Odor None 12/01/24 1915   Number of days: 19        Elimination:  Continence:   Bowel: Yes  Bladder: Yes  Urinary Catheter: None   Colostomy/Ileostomy/Ileal Conduit: No       Date of Last BM: 12/01/2024    Intake/Output Summary (Last 24 hours) at 12/2/2024 0805  Last data filed at 12/2/2024 0456  Gross per 24 hour   Intake 470 ml   Output 0 ml   Net 470 ml     I/O last 3 completed shifts:  In: 470 [P.O.:470]  Out: 0     Safety Concerns:     At Risk for Falls    Impairments/Disabilities:      Vision    Nutrition Therapy:  Current Nutrition Therapy:   - Oral Diet:  General  - Oral Nutrition Supplement:  Wound Healing  three times a

## 2024-11-27 NOTE — CARE COORDINATION
Patient participated in therapies during shift. Utilized ice packs on right hip for pain relief. Patient educated on how to use incentive spirometer. Patient verbalized understanding and demonstrated proper use. Emphasized importance and usage of device, with coughing and deep breathing every 4 hours while awake.

## 2024-11-28 LAB
ANION GAP SERPL CALC-SCNC: 14 MEQ/L (ref 8–16)
BUN SERPL-MCNC: 23 MG/DL (ref 7–22)
CALCIUM SERPL-MCNC: 9 MG/DL (ref 8.5–10.5)
CHLORIDE SERPL-SCNC: 101 MEQ/L (ref 98–111)
CO2 SERPL-SCNC: 21 MEQ/L (ref 23–33)
CREAT SERPL-MCNC: 0.8 MG/DL (ref 0.4–1.2)
GFR SERPL CREATININE-BSD FRML MDRD: 76 ML/MIN/1.73M2
GLUCOSE BLD STRIP.AUTO-MCNC: 126 MG/DL (ref 70–108)
GLUCOSE SERPL-MCNC: 128 MG/DL (ref 70–108)
POTASSIUM SERPL-SCNC: 3.8 MEQ/L (ref 3.5–5.2)
SODIUM SERPL-SCNC: 136 MEQ/L (ref 135–145)

## 2024-11-28 PROCEDURE — 1180000000 HC REHAB R&B

## 2024-11-28 PROCEDURE — 80048 BASIC METABOLIC PNL TOTAL CA: CPT

## 2024-11-28 PROCEDURE — 6370000000 HC RX 637 (ALT 250 FOR IP): Performed by: STUDENT IN AN ORGANIZED HEALTH CARE EDUCATION/TRAINING PROGRAM

## 2024-11-28 PROCEDURE — 82948 REAGENT STRIP/BLOOD GLUCOSE: CPT

## 2024-11-28 PROCEDURE — 6360000002 HC RX W HCPCS: Performed by: STUDENT IN AN ORGANIZED HEALTH CARE EDUCATION/TRAINING PROGRAM

## 2024-11-28 PROCEDURE — 36415 COLL VENOUS BLD VENIPUNCTURE: CPT

## 2024-11-28 RX ADMIN — PRAVASTATIN SODIUM 20 MG: 20 TABLET ORAL at 08:36

## 2024-11-28 RX ADMIN — ACETAMINOPHEN 1000 MG: 325 TABLET ORAL at 14:17

## 2024-11-28 RX ADMIN — SENNOSIDES AND DOCUSATE SODIUM 1 TABLET: 50; 8.6 TABLET ORAL at 08:36

## 2024-11-28 RX ADMIN — ENOXAPARIN SODIUM 40 MG: 100 INJECTION SUBCUTANEOUS at 08:36

## 2024-11-28 RX ADMIN — MICONAZOLE NITRATE: 2 POWDER TOPICAL at 20:38

## 2024-11-28 RX ADMIN — ALOGLIPTIN 12.5 MG: 12.5 TABLET, FILM COATED ORAL at 08:36

## 2024-11-28 RX ADMIN — TRIAMTERENE AND HYDROCHLOROTHIAZIDE 1 TABLET: 37.5; 25 TABLET ORAL at 08:36

## 2024-11-28 RX ADMIN — TRAMADOL HYDROCHLORIDE 100 MG: 50 TABLET ORAL at 20:37

## 2024-11-28 RX ADMIN — AMLODIPINE BESYLATE 10 MG: 10 TABLET ORAL at 08:36

## 2024-11-28 RX ADMIN — TRAMADOL HYDROCHLORIDE 100 MG: 50 TABLET ORAL at 12:17

## 2024-11-28 RX ADMIN — MICONAZOLE NITRATE: 2 POWDER TOPICAL at 08:36

## 2024-11-28 RX ADMIN — TRAMADOL HYDROCHLORIDE 100 MG: 50 TABLET ORAL at 06:12

## 2024-11-28 RX ADMIN — METAXALONE 800 MG: 800 TABLET ORAL at 20:37

## 2024-11-28 RX ADMIN — LOSARTAN POTASSIUM 50 MG: 50 TABLET, FILM COATED ORAL at 08:36

## 2024-11-28 RX ADMIN — METFORMIN HYDROCHLORIDE 1000 MG: 500 TABLET ORAL at 08:36

## 2024-11-28 RX ADMIN — METFORMIN HYDROCHLORIDE 1000 MG: 500 TABLET ORAL at 18:08

## 2024-11-28 RX ADMIN — METAXALONE 800 MG: 800 TABLET ORAL at 14:17

## 2024-11-28 RX ADMIN — GLIPIZIDE 10 MG: 10 TABLET ORAL at 08:36

## 2024-11-28 RX ADMIN — ACETAMINOPHEN 1000 MG: 325 TABLET ORAL at 06:12

## 2024-11-28 RX ADMIN — METAXALONE 800 MG: 800 TABLET ORAL at 08:36

## 2024-11-28 RX ADMIN — SENNOSIDES AND DOCUSATE SODIUM 1 TABLET: 50; 8.6 TABLET ORAL at 20:37

## 2024-11-28 RX ADMIN — ACETAMINOPHEN 1000 MG: 325 TABLET ORAL at 20:37

## 2024-11-28 RX ADMIN — Medication 12.5 MG: at 06:12

## 2024-11-28 RX ADMIN — POLYETHYLENE GLYCOL 3350 17 G: 17 POWDER, FOR SOLUTION ORAL at 08:36

## 2024-11-28 ASSESSMENT — PAIN DESCRIPTION - DESCRIPTORS
DESCRIPTORS: ACHING;DISCOMFORT
DESCRIPTORS: SHARP
DESCRIPTORS: SHARP
DESCRIPTORS: THROBBING;HEAVINESS

## 2024-11-28 ASSESSMENT — PAIN DESCRIPTION - LOCATION
LOCATION: BACK
LOCATION: BACK;LEG
LOCATION: BACK
LOCATION: BACK

## 2024-11-28 ASSESSMENT — PAIN SCALES - GENERAL
PAINLEVEL_OUTOF10: 5
PAINLEVEL_OUTOF10: 6
PAINLEVEL_OUTOF10: 10

## 2024-11-28 ASSESSMENT — PAIN DESCRIPTION - ORIENTATION
ORIENTATION: RIGHT
ORIENTATION: MID;LOWER
ORIENTATION: RIGHT

## 2024-11-28 ASSESSMENT — PAIN - FUNCTIONAL ASSESSMENT: PAIN_FUNCTIONAL_ASSESSMENT: PREVENTS OR INTERFERES SOME ACTIVE ACTIVITIES AND ADLS

## 2024-11-28 ASSESSMENT — PAIN SCALES - WONG BAKER: WONGBAKER_NUMERICALRESPONSE: HURTS A LITTLE BIT

## 2024-11-29 LAB — GLUCOSE BLD STRIP.AUTO-MCNC: 124 MG/DL (ref 70–108)

## 2024-11-29 PROCEDURE — 97110 THERAPEUTIC EXERCISES: CPT

## 2024-11-29 PROCEDURE — 97130 THER IVNTJ EA ADDL 15 MIN: CPT

## 2024-11-29 PROCEDURE — 97530 THERAPEUTIC ACTIVITIES: CPT

## 2024-11-29 PROCEDURE — 1180000000 HC REHAB R&B

## 2024-11-29 PROCEDURE — 82948 REAGENT STRIP/BLOOD GLUCOSE: CPT

## 2024-11-29 PROCEDURE — 97116 GAIT TRAINING THERAPY: CPT

## 2024-11-29 PROCEDURE — 6360000002 HC RX W HCPCS: Performed by: STUDENT IN AN ORGANIZED HEALTH CARE EDUCATION/TRAINING PROGRAM

## 2024-11-29 PROCEDURE — 97112 NEUROMUSCULAR REEDUCATION: CPT

## 2024-11-29 PROCEDURE — 97535 SELF CARE MNGMENT TRAINING: CPT

## 2024-11-29 PROCEDURE — 6370000000 HC RX 637 (ALT 250 FOR IP): Performed by: STUDENT IN AN ORGANIZED HEALTH CARE EDUCATION/TRAINING PROGRAM

## 2024-11-29 PROCEDURE — 99232 SBSQ HOSP IP/OBS MODERATE 35: CPT | Performed by: STUDENT IN AN ORGANIZED HEALTH CARE EDUCATION/TRAINING PROGRAM

## 2024-11-29 PROCEDURE — 97129 THER IVNTJ 1ST 15 MIN: CPT

## 2024-11-29 RX ADMIN — DICLOFENAC SODIUM 2 G: 10 GEL TOPICAL at 11:02

## 2024-11-29 RX ADMIN — ENOXAPARIN SODIUM 40 MG: 100 INJECTION SUBCUTANEOUS at 07:43

## 2024-11-29 RX ADMIN — TRIAMTERENE AND HYDROCHLOROTHIAZIDE 1 TABLET: 37.5; 25 TABLET ORAL at 07:44

## 2024-11-29 RX ADMIN — METAXALONE 800 MG: 800 TABLET ORAL at 07:44

## 2024-11-29 RX ADMIN — MICONAZOLE NITRATE: 2 POWDER TOPICAL at 20:39

## 2024-11-29 RX ADMIN — SENNOSIDES AND DOCUSATE SODIUM 1 TABLET: 50; 8.6 TABLET ORAL at 07:42

## 2024-11-29 RX ADMIN — METFORMIN HYDROCHLORIDE 1000 MG: 500 TABLET ORAL at 07:43

## 2024-11-29 RX ADMIN — AMLODIPINE BESYLATE 10 MG: 10 TABLET ORAL at 07:45

## 2024-11-29 RX ADMIN — PRAVASTATIN SODIUM 20 MG: 20 TABLET ORAL at 07:45

## 2024-11-29 RX ADMIN — GLIPIZIDE 10 MG: 10 TABLET ORAL at 07:44

## 2024-11-29 RX ADMIN — METAXALONE 800 MG: 800 TABLET ORAL at 13:53

## 2024-11-29 RX ADMIN — Medication 12.5 MG: at 05:55

## 2024-11-29 RX ADMIN — POLYETHYLENE GLYCOL 3350 17 G: 17 POWDER, FOR SOLUTION ORAL at 07:42

## 2024-11-29 RX ADMIN — TRAMADOL HYDROCHLORIDE 100 MG: 50 TABLET ORAL at 05:55

## 2024-11-29 RX ADMIN — ACETAMINOPHEN 1000 MG: 325 TABLET ORAL at 20:35

## 2024-11-29 RX ADMIN — LOSARTAN POTASSIUM 50 MG: 50 TABLET, FILM COATED ORAL at 07:44

## 2024-11-29 RX ADMIN — METFORMIN HYDROCHLORIDE 1000 MG: 500 TABLET ORAL at 17:33

## 2024-11-29 RX ADMIN — MICONAZOLE NITRATE: 2 POWDER TOPICAL at 07:46

## 2024-11-29 RX ADMIN — SENNOSIDES AND DOCUSATE SODIUM 1 TABLET: 50; 8.6 TABLET ORAL at 20:35

## 2024-11-29 RX ADMIN — ACETAMINOPHEN 1000 MG: 325 TABLET ORAL at 05:55

## 2024-11-29 RX ADMIN — METAXALONE 800 MG: 800 TABLET ORAL at 20:34

## 2024-11-29 RX ADMIN — TRAMADOL HYDROCHLORIDE 50 MG: 50 TABLET ORAL at 11:01

## 2024-11-29 RX ADMIN — ACETAMINOPHEN 1000 MG: 325 TABLET ORAL at 13:53

## 2024-11-29 RX ADMIN — ALOGLIPTIN 12.5 MG: 12.5 TABLET, FILM COATED ORAL at 07:45

## 2024-11-29 ASSESSMENT — PAIN DESCRIPTION - DESCRIPTORS
DESCRIPTORS: ACHING
DESCRIPTORS: ACHING;DISCOMFORT
DESCRIPTORS: ACHING
DESCRIPTORS: ACHING

## 2024-11-29 ASSESSMENT — PAIN SCALES - GENERAL
PAINLEVEL_OUTOF10: 5
PAINLEVEL_OUTOF10: 4
PAINLEVEL_OUTOF10: 10
PAINLEVEL_OUTOF10: 6
PAINLEVEL_OUTOF10: 5

## 2024-11-29 ASSESSMENT — PAIN DESCRIPTION - ORIENTATION
ORIENTATION: LOWER
ORIENTATION: LOWER
ORIENTATION: RIGHT
ORIENTATION: LOWER
ORIENTATION: RIGHT
ORIENTATION: RIGHT

## 2024-11-29 ASSESSMENT — PAIN SCALES - WONG BAKER: WONGBAKER_NUMERICALRESPONSE: HURTS A LITTLE BIT

## 2024-11-29 ASSESSMENT — PAIN DESCRIPTION - LOCATION
LOCATION: HIP;LEG
LOCATION: BACK
LOCATION: LEG;HIP
LOCATION: BACK
LOCATION: HIP;LEG
LOCATION: BACK

## 2024-11-29 ASSESSMENT — PAIN DESCRIPTION - PAIN TYPE: TYPE: CHRONIC PAIN

## 2024-11-29 ASSESSMENT — PAIN DESCRIPTION - FREQUENCY: FREQUENCY: CONTINUOUS

## 2024-11-29 NOTE — CARE COORDINATION
Patient working well with therapy. Voltran cream added for pain management. Patient has been polite and cooperative today with staff. No complaints of n/v/ or SOB. Call light is in reach. No distress evident.

## 2024-11-30 LAB — GLUCOSE BLD STRIP.AUTO-MCNC: 127 MG/DL (ref 70–108)

## 2024-11-30 PROCEDURE — 6360000002 HC RX W HCPCS: Performed by: STUDENT IN AN ORGANIZED HEALTH CARE EDUCATION/TRAINING PROGRAM

## 2024-11-30 PROCEDURE — 6370000000 HC RX 637 (ALT 250 FOR IP): Performed by: STUDENT IN AN ORGANIZED HEALTH CARE EDUCATION/TRAINING PROGRAM

## 2024-11-30 PROCEDURE — 82948 REAGENT STRIP/BLOOD GLUCOSE: CPT

## 2024-11-30 PROCEDURE — 1180000000 HC REHAB R&B

## 2024-11-30 RX ADMIN — TRIAMTERENE AND HYDROCHLOROTHIAZIDE 1 TABLET: 37.5; 25 TABLET ORAL at 08:44

## 2024-11-30 RX ADMIN — METAXALONE 800 MG: 800 TABLET ORAL at 08:43

## 2024-11-30 RX ADMIN — DICLOFENAC SODIUM 2 G: 10 GEL TOPICAL at 02:02

## 2024-11-30 RX ADMIN — ALOGLIPTIN 12.5 MG: 12.5 TABLET, FILM COATED ORAL at 08:47

## 2024-11-30 RX ADMIN — SENNOSIDES AND DOCUSATE SODIUM 1 TABLET: 50; 8.6 TABLET ORAL at 08:57

## 2024-11-30 RX ADMIN — METFORMIN HYDROCHLORIDE 1000 MG: 500 TABLET ORAL at 08:43

## 2024-11-30 RX ADMIN — QUETIAPINE FUMARATE 25 MG: 25 TABLET ORAL at 21:31

## 2024-11-30 RX ADMIN — TRAMADOL HYDROCHLORIDE 100 MG: 50 TABLET ORAL at 17:11

## 2024-11-30 RX ADMIN — POLYETHYLENE GLYCOL 3350 17 G: 17 POWDER, FOR SOLUTION ORAL at 08:50

## 2024-11-30 RX ADMIN — SENNOSIDES AND DOCUSATE SODIUM 1 TABLET: 50; 8.6 TABLET ORAL at 21:32

## 2024-11-30 RX ADMIN — ACETAMINOPHEN 1000 MG: 325 TABLET ORAL at 21:32

## 2024-11-30 RX ADMIN — METFORMIN HYDROCHLORIDE 1000 MG: 500 TABLET ORAL at 16:53

## 2024-11-30 RX ADMIN — MICONAZOLE NITRATE: 2 POWDER TOPICAL at 08:54

## 2024-11-30 RX ADMIN — ACETAMINOPHEN 1000 MG: 325 TABLET ORAL at 16:53

## 2024-11-30 RX ADMIN — AMLODIPINE BESYLATE 10 MG: 10 TABLET ORAL at 08:46

## 2024-11-30 RX ADMIN — LOSARTAN POTASSIUM 50 MG: 50 TABLET, FILM COATED ORAL at 08:52

## 2024-11-30 RX ADMIN — DICLOFENAC SODIUM 2 G: 10 GEL TOPICAL at 05:58

## 2024-11-30 RX ADMIN — METAXALONE 800 MG: 800 TABLET ORAL at 21:32

## 2024-11-30 RX ADMIN — Medication 12.5 MG: at 05:58

## 2024-11-30 RX ADMIN — ACETAMINOPHEN 1000 MG: 325 TABLET ORAL at 05:58

## 2024-11-30 RX ADMIN — ENOXAPARIN SODIUM 40 MG: 100 INJECTION SUBCUTANEOUS at 08:40

## 2024-11-30 RX ADMIN — METAXALONE 800 MG: 800 TABLET ORAL at 16:52

## 2024-11-30 RX ADMIN — PRAVASTATIN SODIUM 20 MG: 20 TABLET ORAL at 08:47

## 2024-11-30 RX ADMIN — TRAMADOL HYDROCHLORIDE 50 MG: 50 TABLET ORAL at 02:04

## 2024-11-30 RX ADMIN — TRAMADOL HYDROCHLORIDE 100 MG: 50 TABLET ORAL at 08:40

## 2024-11-30 RX ADMIN — GLIPIZIDE 10 MG: 10 TABLET ORAL at 08:52

## 2024-11-30 ASSESSMENT — PAIN SCALES - GENERAL
PAINLEVEL_OUTOF10: 7
PAINLEVEL_OUTOF10: 5
PAINLEVEL_OUTOF10: 5
PAINLEVEL_OUTOF10: 0
PAINLEVEL_OUTOF10: 10
PAINLEVEL_OUTOF10: 7

## 2024-11-30 ASSESSMENT — PAIN DESCRIPTION - LOCATION
LOCATION: HIP
LOCATION: BACK
LOCATION: BACK
LOCATION: HIP

## 2024-11-30 ASSESSMENT — PAIN DESCRIPTION - DESCRIPTORS
DESCRIPTORS: ACHING;DISCOMFORT
DESCRIPTORS: BURNING
DESCRIPTORS: ACHING
DESCRIPTORS: SHARP

## 2024-11-30 ASSESSMENT — PAIN DESCRIPTION - ORIENTATION
ORIENTATION: RIGHT
ORIENTATION: RIGHT
ORIENTATION: MID
ORIENTATION: RIGHT;LEFT

## 2024-11-30 ASSESSMENT — PAIN - FUNCTIONAL ASSESSMENT
PAIN_FUNCTIONAL_ASSESSMENT: ACTIVITIES ARE NOT PREVENTED
PAIN_FUNCTIONAL_ASSESSMENT: ACTIVITIES ARE NOT PREVENTED

## 2024-11-30 ASSESSMENT — PAIN SCALES - WONG BAKER: WONGBAKER_NUMERICALRESPONSE: NO HURT

## 2024-11-30 NOTE — CARE COORDINATION
Patient back pain not managed well. She is taking scheduled tylenol and PRN tramadol. Wife was wheeled to 3B to visit  and came back tearful. Family at bedside. Tele sitter discontinued during night shift patient mentation improving.

## 2024-12-01 LAB — GLUCOSE BLD STRIP.AUTO-MCNC: 141 MG/DL (ref 70–108)

## 2024-12-01 PROCEDURE — 97112 NEUROMUSCULAR REEDUCATION: CPT

## 2024-12-01 PROCEDURE — 97535 SELF CARE MNGMENT TRAINING: CPT

## 2024-12-01 PROCEDURE — 97530 THERAPEUTIC ACTIVITIES: CPT

## 2024-12-01 PROCEDURE — 97110 THERAPEUTIC EXERCISES: CPT

## 2024-12-01 PROCEDURE — 82948 REAGENT STRIP/BLOOD GLUCOSE: CPT

## 2024-12-01 PROCEDURE — 97130 THER IVNTJ EA ADDL 15 MIN: CPT

## 2024-12-01 PROCEDURE — 1180000000 HC REHAB R&B

## 2024-12-01 PROCEDURE — 6370000000 HC RX 637 (ALT 250 FOR IP): Performed by: STUDENT IN AN ORGANIZED HEALTH CARE EDUCATION/TRAINING PROGRAM

## 2024-12-01 PROCEDURE — 97116 GAIT TRAINING THERAPY: CPT

## 2024-12-01 PROCEDURE — 6360000002 HC RX W HCPCS: Performed by: STUDENT IN AN ORGANIZED HEALTH CARE EDUCATION/TRAINING PROGRAM

## 2024-12-01 PROCEDURE — 97129 THER IVNTJ 1ST 15 MIN: CPT

## 2024-12-01 RX ADMIN — POLYETHYLENE GLYCOL 3350 17 G: 17 POWDER, FOR SOLUTION ORAL at 09:08

## 2024-12-01 RX ADMIN — METAXALONE 800 MG: 800 TABLET ORAL at 09:11

## 2024-12-01 RX ADMIN — MICONAZOLE NITRATE: 2 POWDER TOPICAL at 19:28

## 2024-12-01 RX ADMIN — TRAMADOL HYDROCHLORIDE 100 MG: 50 TABLET ORAL at 18:28

## 2024-12-01 RX ADMIN — Medication 12.5 MG: at 06:50

## 2024-12-01 RX ADMIN — SENNOSIDES AND DOCUSATE SODIUM 1 TABLET: 50; 8.6 TABLET ORAL at 19:28

## 2024-12-01 RX ADMIN — METAXALONE 800 MG: 800 TABLET ORAL at 15:54

## 2024-12-01 RX ADMIN — ACETAMINOPHEN 1000 MG: 325 TABLET ORAL at 21:26

## 2024-12-01 RX ADMIN — TRAMADOL HYDROCHLORIDE 100 MG: 50 TABLET ORAL at 06:50

## 2024-12-01 RX ADMIN — METAXALONE 800 MG: 800 TABLET ORAL at 19:28

## 2024-12-01 RX ADMIN — ENOXAPARIN SODIUM 40 MG: 100 INJECTION SUBCUTANEOUS at 09:07

## 2024-12-01 RX ADMIN — TRIAMTERENE AND HYDROCHLOROTHIAZIDE 1 TABLET: 37.5; 25 TABLET ORAL at 09:11

## 2024-12-01 RX ADMIN — PRAVASTATIN SODIUM 20 MG: 20 TABLET ORAL at 09:11

## 2024-12-01 RX ADMIN — MICONAZOLE NITRATE: 2 POWDER TOPICAL at 09:01

## 2024-12-01 RX ADMIN — ACETAMINOPHEN 1000 MG: 325 TABLET ORAL at 15:54

## 2024-12-01 RX ADMIN — METFORMIN HYDROCHLORIDE 1000 MG: 500 TABLET ORAL at 17:43

## 2024-12-01 RX ADMIN — AMLODIPINE BESYLATE 10 MG: 10 TABLET ORAL at 09:11

## 2024-12-01 RX ADMIN — DICLOFENAC SODIUM 2 G: 10 GEL TOPICAL at 09:01

## 2024-12-01 RX ADMIN — SENNOSIDES AND DOCUSATE SODIUM 1 TABLET: 50; 8.6 TABLET ORAL at 09:09

## 2024-12-01 RX ADMIN — ACETAMINOPHEN 1000 MG: 325 TABLET ORAL at 06:50

## 2024-12-01 RX ADMIN — METFORMIN HYDROCHLORIDE 1000 MG: 500 TABLET ORAL at 09:11

## 2024-12-01 RX ADMIN — TRAMADOL HYDROCHLORIDE 100 MG: 50 TABLET ORAL at 12:25

## 2024-12-01 RX ADMIN — LOSARTAN POTASSIUM 50 MG: 50 TABLET, FILM COATED ORAL at 09:11

## 2024-12-01 RX ADMIN — ALOGLIPTIN 12.5 MG: 12.5 TABLET, FILM COATED ORAL at 09:11

## 2024-12-01 RX ADMIN — GLIPIZIDE 10 MG: 10 TABLET ORAL at 09:11

## 2024-12-01 ASSESSMENT — PAIN DESCRIPTION - LOCATION
LOCATION: BACK

## 2024-12-01 ASSESSMENT — PAIN SCALES - GENERAL
PAINLEVEL_OUTOF10: 7
PAINLEVEL_OUTOF10: 6
PAINLEVEL_OUTOF10: 9
PAINLEVEL_OUTOF10: 7
PAINLEVEL_OUTOF10: 5
PAINLEVEL_OUTOF10: 7
PAINLEVEL_OUTOF10: 6
PAINLEVEL_OUTOF10: 6
PAINLEVEL_OUTOF10: 7
PAINLEVEL_OUTOF10: 6
PAINLEVEL_OUTOF10: 7

## 2024-12-01 ASSESSMENT — PAIN DESCRIPTION - ORIENTATION
ORIENTATION: MID
ORIENTATION: MID
ORIENTATION: LOWER
ORIENTATION: LOWER
ORIENTATION: MID

## 2024-12-01 ASSESSMENT — PAIN DESCRIPTION - DESCRIPTORS
DESCRIPTORS: ACHING;THROBBING
DESCRIPTORS: THROBBING;ACHING
DESCRIPTORS: ACHING
DESCRIPTORS: ACHING

## 2024-12-01 ASSESSMENT — PAIN - FUNCTIONAL ASSESSMENT
PAIN_FUNCTIONAL_ASSESSMENT: ACTIVITIES ARE NOT PREVENTED
PAIN_FUNCTIONAL_ASSESSMENT: PREVENTS OR INTERFERES SOME ACTIVE ACTIVITIES AND ADLS
PAIN_FUNCTIONAL_ASSESSMENT: ACTIVITIES ARE NOT PREVENTED
PAIN_FUNCTIONAL_ASSESSMENT: ACTIVITIES ARE NOT PREVENTED

## 2024-12-01 NOTE — CARE COORDINATION
Patient is up x1 with walker. C/O increased pain in her back with ambulation. She is taking scheduled tylenol and PRN tramadol. Expected discharge 12/2 to SNF.

## 2024-12-02 VITALS
DIASTOLIC BLOOD PRESSURE: 73 MMHG | OXYGEN SATURATION: 97 % | RESPIRATION RATE: 18 BRPM | TEMPERATURE: 98 F | WEIGHT: 165.34 LBS | HEART RATE: 105 BPM | HEIGHT: 63 IN | SYSTOLIC BLOOD PRESSURE: 122 MMHG | BODY MASS INDEX: 29.3 KG/M2

## 2024-12-02 PROBLEM — E87.1 HYPONATREMIA: Status: ACTIVE | Noted: 2024-12-02

## 2024-12-02 LAB
ANION GAP SERPL CALC-SCNC: 13 MEQ/L (ref 8–16)
BASOPHILS ABSOLUTE: 0.1 THOU/MM3 (ref 0–0.1)
BASOPHILS NFR BLD AUTO: 0.7 %
BUN SERPL-MCNC: 24 MG/DL (ref 7–22)
CALCIUM SERPL-MCNC: 8.8 MG/DL (ref 8.5–10.5)
CHLORIDE SERPL-SCNC: 97 MEQ/L (ref 98–111)
CO2 SERPL-SCNC: 22 MEQ/L (ref 23–33)
CREAT SERPL-MCNC: 0.7 MG/DL (ref 0.4–1.2)
DEPRECATED RDW RBC AUTO: 46.6 FL (ref 35–45)
EOSINOPHIL NFR BLD AUTO: 1.4 %
EOSINOPHILS ABSOLUTE: 0.1 THOU/MM3 (ref 0–0.4)
ERYTHROCYTE [DISTWIDTH] IN BLOOD BY AUTOMATED COUNT: 13 % (ref 11.5–14.5)
GFR SERPL CREATININE-BSD FRML MDRD: 89 ML/MIN/1.73M2
GLUCOSE BLD STRIP.AUTO-MCNC: 168 MG/DL (ref 70–108)
GLUCOSE SERPL-MCNC: 150 MG/DL (ref 70–108)
HCT VFR BLD AUTO: 35.2 % (ref 37–47)
HGB BLD-MCNC: 11.4 GM/DL (ref 12–16)
IMM GRANULOCYTES # BLD AUTO: 0.1 THOU/MM3 (ref 0–0.07)
IMM GRANULOCYTES NFR BLD AUTO: 1 %
LYMPHOCYTES ABSOLUTE: 1.1 THOU/MM3 (ref 1–4.8)
LYMPHOCYTES NFR BLD AUTO: 11.1 %
MCH RBC QN AUTO: 32.1 PG (ref 26–33)
MCHC RBC AUTO-ENTMCNC: 32.4 GM/DL (ref 32.2–35.5)
MCV RBC AUTO: 99.2 FL (ref 81–99)
MONOCYTES ABSOLUTE: 0.9 THOU/MM3 (ref 0.4–1.3)
MONOCYTES NFR BLD AUTO: 8.9 %
NEUTROPHILS ABSOLUTE: 7.4 THOU/MM3 (ref 1.8–7.7)
NEUTROPHILS NFR BLD AUTO: 76.9 %
NRBC BLD AUTO-RTO: 0 /100 WBC
PLATELET # BLD AUTO: 485 THOU/MM3 (ref 130–400)
PMV BLD AUTO: 9.7 FL (ref 9.4–12.4)
POTASSIUM SERPL-SCNC: 3.8 MEQ/L (ref 3.5–5.2)
RBC # BLD AUTO: 3.55 MILL/MM3 (ref 4.2–5.4)
SODIUM SERPL-SCNC: 132 MEQ/L (ref 135–145)
WBC # BLD AUTO: 9.6 THOU/MM3 (ref 4.8–10.8)

## 2024-12-02 PROCEDURE — 82948 REAGENT STRIP/BLOOD GLUCOSE: CPT

## 2024-12-02 PROCEDURE — 6370000000 HC RX 637 (ALT 250 FOR IP): Performed by: STUDENT IN AN ORGANIZED HEALTH CARE EDUCATION/TRAINING PROGRAM

## 2024-12-02 PROCEDURE — 99239 HOSP IP/OBS DSCHRG MGMT >30: CPT | Performed by: NURSE PRACTITIONER

## 2024-12-02 PROCEDURE — 36415 COLL VENOUS BLD VENIPUNCTURE: CPT

## 2024-12-02 PROCEDURE — 85025 COMPLETE CBC W/AUTO DIFF WBC: CPT

## 2024-12-02 PROCEDURE — 6360000002 HC RX W HCPCS: Performed by: STUDENT IN AN ORGANIZED HEALTH CARE EDUCATION/TRAINING PROGRAM

## 2024-12-02 PROCEDURE — 80048 BASIC METABOLIC PNL TOTAL CA: CPT

## 2024-12-02 RX ORDER — ONDANSETRON 4 MG/1
4 TABLET, ORALLY DISINTEGRATING ORAL EVERY 8 HOURS PRN
DISCHARGE
Start: 2024-12-02

## 2024-12-02 RX ORDER — TRAMADOL HYDROCHLORIDE 50 MG/1
50 TABLET ORAL EVERY 6 HOURS PRN
Qty: 28 TABLET | Refills: 0 | Status: SHIPPED | OUTPATIENT
Start: 2024-12-02 | End: 2024-12-09

## 2024-12-02 RX ORDER — MAGNESIUM HYDROXIDE/ALUMINUM HYDROXICE/SIMETHICONE 120; 1200; 1200 MG/30ML; MG/30ML; MG/30ML
30 SUSPENSION ORAL EVERY 6 HOURS PRN
DISCHARGE
Start: 2024-12-02

## 2024-12-02 RX ORDER — BISACODYL 10 MG
10 SUPPOSITORY, RECTAL RECTAL DAILY PRN
DISCHARGE
Start: 2024-12-02 | End: 2025-01-01

## 2024-12-02 RX ORDER — METAXALONE 800 MG/1
800 TABLET ORAL 3 TIMES DAILY
DISCHARGE
Start: 2024-12-02 | End: 2024-12-12

## 2024-12-02 RX ORDER — QUETIAPINE FUMARATE 25 MG/1
25 TABLET, FILM COATED ORAL NIGHTLY PRN
DISCHARGE
Start: 2024-12-02

## 2024-12-02 RX ORDER — POLYETHYLENE GLYCOL 3350 17 G/17G
17 POWDER, FOR SOLUTION ORAL DAILY
DISCHARGE
Start: 2024-12-02 | End: 2025-01-01

## 2024-12-02 RX ADMIN — PRAVASTATIN SODIUM 20 MG: 20 TABLET ORAL at 09:40

## 2024-12-02 RX ADMIN — ACETAMINOPHEN 1000 MG: 325 TABLET ORAL at 13:41

## 2024-12-02 RX ADMIN — AMLODIPINE BESYLATE 10 MG: 10 TABLET ORAL at 09:41

## 2024-12-02 RX ADMIN — METFORMIN HYDROCHLORIDE 1000 MG: 500 TABLET ORAL at 09:40

## 2024-12-02 RX ADMIN — ALOGLIPTIN 12.5 MG: 12.5 TABLET, FILM COATED ORAL at 09:41

## 2024-12-02 RX ADMIN — Medication 12.5 MG: at 05:12

## 2024-12-02 RX ADMIN — TRAMADOL HYDROCHLORIDE 100 MG: 50 TABLET ORAL at 09:30

## 2024-12-02 RX ADMIN — QUETIAPINE FUMARATE 25 MG: 25 TABLET ORAL at 09:40

## 2024-12-02 RX ADMIN — GLIPIZIDE 10 MG: 10 TABLET ORAL at 09:40

## 2024-12-02 RX ADMIN — ACETAMINOPHEN 1000 MG: 325 TABLET ORAL at 05:12

## 2024-12-02 RX ADMIN — ENOXAPARIN SODIUM 40 MG: 100 INJECTION SUBCUTANEOUS at 09:40

## 2024-12-02 RX ADMIN — LOSARTAN POTASSIUM 50 MG: 50 TABLET, FILM COATED ORAL at 09:41

## 2024-12-02 RX ADMIN — MICONAZOLE NITRATE: 2 POWDER TOPICAL at 09:39

## 2024-12-02 RX ADMIN — POLYETHYLENE GLYCOL 3350 17 G: 17 POWDER, FOR SOLUTION ORAL at 09:40

## 2024-12-02 RX ADMIN — METAXALONE 800 MG: 800 TABLET ORAL at 09:41

## 2024-12-02 RX ADMIN — TRIAMTERENE AND HYDROCHLOROTHIAZIDE 1 TABLET: 37.5; 25 TABLET ORAL at 09:40

## 2024-12-02 RX ADMIN — SENNOSIDES AND DOCUSATE SODIUM 1 TABLET: 50; 8.6 TABLET ORAL at 09:40

## 2024-12-02 ASSESSMENT — PAIN DESCRIPTION - ORIENTATION: ORIENTATION: RIGHT

## 2024-12-02 ASSESSMENT — PAIN SCALES - GENERAL: PAINLEVEL_OUTOF10: 10

## 2024-12-02 ASSESSMENT — PAIN DESCRIPTION - LOCATION: LOCATION: HIP

## 2024-12-02 ASSESSMENT — PAIN DESCRIPTION - DESCRIPTORS: DESCRIPTORS: ACHING;SHOOTING

## 2024-12-02 NOTE — DISCHARGE SUMMARY
Physical Medicine & Rehabilitation   Discharge Summary     Patient Identification:  Sadia Gomez  : [id number]  Admit date: 2024  Discharge date: 24   Attending provider: Cass Dennis DO        Primary care provider: Britt House APRN - NP     Discharge Diagnoses:   Lumbar stenosis with radiculopathy  Type 2 diabetes with hyperglycemia  Hypertension  HLD  Tachycardia  Postop anemia  Impaired mobility and ADLs    Discharge Functional Status:    Occupational Therapy:  EATING:Independent      ORAL HYGIENE:Independent (Seated in w/c at sink to complete, patient unable to complete in standing position due to pain in back)      TOILETING HYGIENE:Supervision or touching assistance (CGA- SBA for clothing management)      SHOWERING/BATHING:Partial/moderate assistance (Washing bottom while standing. CGA to wash kennedi area with encouragement provided. Education on use of LH to wash LEs. Able to wash remaining areas seated in w/c with setup. Increased time for all tasks)      UPPER BODY DRESSING:Partial/moderate assistance (To doff/don brace in seated position, increased time with min verbal cues on proper technique. 1 cue provided to adjust shirt downward in front once pulled over head)       LOWER BODY DRESSING:Partial/moderate assistance (Utilizing dressing stick to thread LEs with difficulty noted. CGA for standing balance while pulling pants downward and upward.)       FOOTWEAR:Partial/moderate assistance (To don B SELENE hose, able to don slip on shoes with setup)       TOILET TRANSFER: Supervision or touching assistance (To/from STS using grab bars with increased time)         Physical Therapy:  ROLLING LEFT AND RIGHT: Partial/moderate assistance       SIT TO SIDELYING:  Partial/moderate assistance      SIDELYING TO SIT: Partial/moderate assistance      SIT TO STAND:Supervision or touching assistance       CHAIR TO BED TRANSFER:Supervision or touching assistance       CAR TRANSFER:Partial/moderate

## 2024-12-02 NOTE — PLAN OF CARE
Problem: Chronic Conditions and Co-morbidities  Goal: Patient's chronic conditions and co-morbidity symptoms are monitored and maintained or improved  11/18/2024 2325 by Minor Penaloza, RN  Outcome: Progressing  Flowsheets (Taken 11/18/2024 2325)  Care Plan - Patient's Chronic Conditions and Co-Morbidity Symptoms are Monitored and Maintained or Improved: Monitor and assess patient's chronic conditions and comorbid symptoms for stability, deterioration, or improvement     Problem: Discharge Planning  Goal: Discharge to home or other facility with appropriate resources  11/18/2024 2325 by Minor Penaloza, RN  Outcome: Progressing  Flowsheets (Taken 11/18/2024 2325)  Discharge to home or other facility with appropriate resources: Identify barriers to discharge with patient and caregiver     Problem: Pain  Goal: Verbalizes/displays adequate comfort level or baseline comfort level  Outcome: Progressing  Flowsheets (Taken 11/18/2024 2325)  Verbalizes/displays adequate comfort level or baseline comfort level:   Encourage patient to monitor pain and request assistance   Assess pain using appropriate pain scale   Administer analgesics based on type and severity of pain and evaluate response   Implement non-pharmacological measures as appropriate and evaluate response   Notify Licensed Independent Practitioner if interventions unsuccessful or patient reports new pain     Problem: Safety - Adult  Goal: Free from fall injury  11/18/2024 2325 by Minor Penaloza, RN  Outcome: Progressing  Flowsheets (Taken 11/18/2024 2325)  Free From Fall Injury: Instruct family/caregiver on patient safety     Problem: Skin/Tissue Integrity  Goal: Absence of new skin breakdown  Description: 1.  Monitor for areas of redness and/or skin breakdown  2.  Assess vascular access sites hourly  3.  Every 4-6 hours minimum:  Change oxygen saturation probe site  4.  Every 4-6 hours:  If on nasal continuous positive airway pressure, respiratory 
  Problem: Chronic Conditions and Co-morbidities  Goal: Patient's chronic conditions and co-morbidity symptoms are monitored and maintained or improved  11/21/2024 2156 by Minor Penaloza RN  Outcome: Progressing  Flowsheets (Taken 11/21/2024 2156)  Care Plan - Patient's Chronic Conditions and Co-Morbidity Symptoms are Monitored and Maintained or Improved: Monitor and assess patient's chronic conditions and comorbid symptoms for stability, deterioration, or improvement     Problem: Discharge Planning  Goal: Discharge to home or other facility with appropriate resources  11/21/2024 2156 by Minor Penaloza RN  Outcome: Progressing  Flowsheets (Taken 11/21/2024 2156)  Discharge to home or other facility with appropriate resources: Identify barriers to discharge with patient and caregiver     Problem: Pain  Goal: Verbalizes/displays adequate comfort level or baseline comfort level  11/21/2024 2156 by Minor Penaloza RN  Outcome: Progressing  Flowsheets (Taken 11/21/2024 2156)  Verbalizes/displays adequate comfort level or baseline comfort level:   Encourage patient to monitor pain and request assistance   Assess pain using appropriate pain scale   Administer analgesics based on type and severity of pain and evaluate response   Implement non-pharmacological measures as appropriate and evaluate response     Problem: Safety - Adult  Goal: Free from fall injury  11/21/2024 2156 by Minor Penaloza RN  Outcome: Progressing  Flowsheets (Taken 11/21/2024 2156)  Free From Fall Injury: Instruct family/caregiver on patient safety     Problem: Skin/Tissue Integrity  Goal: Absence of new skin breakdown  Description: 1.  Monitor for areas of redness and/or skin breakdown  2.  Assess vascular access sites hourly  3.  Every 4-6 hours minimum:  Change oxygen saturation probe site  4.  Every 4-6 hours:  If on nasal continuous positive airway pressure, respiratory therapy assess nares and determine need for appliance change or 
  Problem: Chronic Conditions and Co-morbidities  Goal: Patient's chronic conditions and co-morbidity symptoms are monitored and maintained or improved  11/23/2024 2204 by Minor Penaloza RN  Outcome: Progressing  Flowsheets (Taken 11/23/2024 2204)  Care Plan - Patient's Chronic Conditions and Co-Morbidity Symptoms are Monitored and Maintained or Improved:   Monitor and assess patient's chronic conditions and comorbid symptoms for stability, deterioration, or improvement   Collaborate with multidisciplinary team to address chronic and comorbid conditions and prevent exacerbation or deterioration     Problem: Discharge Planning  Goal: Discharge to home or other facility with appropriate resources  11/23/2024 2204 by Minor Penaloza RN  Outcome: Progressing  Flowsheets (Taken 11/23/2024 2204)  Discharge to home or other facility with appropriate resources: Identify barriers to discharge with patient and caregiver     Problem: Pain  Goal: Verbalizes/displays adequate comfort level or baseline comfort level  11/23/2024 2204 by Minor Penaloza RN  Outcome: Progressing  Flowsheets (Taken 11/23/2024 2204)  Verbalizes/displays adequate comfort level or baseline comfort level:   Encourage patient to monitor pain and request assistance   Assess pain using appropriate pain scale   Administer analgesics based on type and severity of pain and evaluate response   Implement non-pharmacological measures as appropriate and evaluate response     Problem: Safety - Adult  Goal: Free from fall injury  11/23/2024 2204 by Minor Penaloza RN  Outcome: Progressing  Flowsheets (Taken 11/23/2024 2204)  Free From Fall Injury: Instruct family/caregiver on patient safety     Problem: Skin/Tissue Integrity  Goal: Absence of new skin breakdown  Description: 1.  Monitor for areas of redness and/or skin breakdown  2.  Assess vascular access sites hourly  3.  Every 4-6 hours minimum:  Change oxygen saturation probe site  4.  Every 4-6 
  Problem: Chronic Conditions and Co-morbidities  Goal: Patient's chronic conditions and co-morbidity symptoms are monitored and maintained or improved  11/24/2024 0931 by Sagar Negrete RN  Outcome: Progressing  Flowsheets (Taken 11/24/2024 0741)  Care Plan - Patient's Chronic Conditions and Co-Morbidity Symptoms are Monitored and Maintained or Improved: Monitor and assess patient's chronic conditions and comorbid symptoms for stability, deterioration, or improvement  11/23/2024 2204 by Minor Penaloza RN  Outcome: Progressing  Flowsheets (Taken 11/23/2024 2204)  Care Plan - Patient's Chronic Conditions and Co-Morbidity Symptoms are Monitored and Maintained or Improved:   Monitor and assess patient's chronic conditions and comorbid symptoms for stability, deterioration, or improvement   Collaborate with multidisciplinary team to address chronic and comorbid conditions and prevent exacerbation or deterioration     Problem: Discharge Planning  Goal: Discharge to home or other facility with appropriate resources  11/24/2024 0931 by Sagar Negrete RN  Outcome: Progressing  Flowsheets (Taken 11/24/2024 0741)  Discharge to home or other facility with appropriate resources: Identify barriers to discharge with patient and caregiver  11/23/2024 2204 by Minor Penaloza RN  Outcome: Progressing  Flowsheets (Taken 11/23/2024 2204)  Discharge to home or other facility with appropriate resources: Identify barriers to discharge with patient and caregiver     Problem: Pain  Goal: Verbalizes/displays adequate comfort level or baseline comfort level  11/24/2024 0931 by Sagar Negrete RN  Outcome: Progressing  Flowsheets (Taken 11/24/2024 0741)  Verbalizes/displays adequate comfort level or baseline comfort level:   Encourage patient to monitor pain and request assistance   Assess pain using appropriate pain scale  11/23/2024 2204 by Minor Penaloza RN  Outcome: Progressing  Flowsheets (Taken 11/23/2024 
  Problem: Chronic Conditions and Co-morbidities  Goal: Patient's chronic conditions and co-morbidity symptoms are monitored and maintained or improved  11/24/2024 2156 by Minor Penaloza RN  Outcome: Progressing  Flowsheets (Taken 11/24/2024 2156)  Care Plan - Patient's Chronic Conditions and Co-Morbidity Symptoms are Monitored and Maintained or Improved:   Monitor and assess patient's chronic conditions and comorbid symptoms for stability, deterioration, or improvement   Collaborate with multidisciplinary team to address chronic and comorbid conditions and prevent exacerbation or deterioration     Problem: Discharge Planning  Goal: Discharge to home or other facility with appropriate resources  11/24/2024 2156 by Minor Penaloza RN  Outcome: Progressing  Flowsheets (Taken 11/24/2024 2156)  Discharge to home or other facility with appropriate resources: Identify barriers to discharge with patient and caregiver     Problem: Pain  Goal: Verbalizes/displays adequate comfort level or baseline comfort level  11/24/2024 2156 by Minor Penaloza RN  Outcome: Progressing  Flowsheets (Taken 11/24/2024 2156)  Verbalizes/displays adequate comfort level or baseline comfort level:   Encourage patient to monitor pain and request assistance   Assess pain using appropriate pain scale   Administer analgesics based on type and severity of pain and evaluate response   Implement non-pharmacological measures as appropriate and evaluate response     Problem: Safety - Adult  Goal: Free from fall injury  11/24/2024 2156 by Minor Penaloza RN  Outcome: Progressing  Flowsheets (Taken 11/24/2024 2156)  Free From Fall Injury: Instruct family/caregiver on patient safety     Problem: Skin/Tissue Integrity  Goal: Absence of new skin breakdown  Description: 1.  Monitor for areas of redness and/or skin breakdown  2.  Assess vascular access sites hourly  3.  Every 4-6 hours minimum:  Change oxygen saturation probe site  4.  Every 4-6 
  Problem: Chronic Conditions and Co-morbidities  Goal: Patient's chronic conditions and co-morbidity symptoms are monitored and maintained or improved  11/27/2024 2307 by Minor Penaloza RN  Outcome: Progressing  Flowsheets (Taken 11/27/2024 2307)  Care Plan - Patient's Chronic Conditions and Co-Morbidity Symptoms are Monitored and Maintained or Improved: Monitor and assess patient's chronic conditions and comorbid symptoms for stability, deterioration, or improvement     Problem: Discharge Planning  Goal: Discharge to home or other facility with appropriate resources  11/27/2024 2307 by Minor Penaloza RN  Outcome: Progressing  Flowsheets (Taken 11/27/2024 2307)  Discharge to home or other facility with appropriate resources:   Identify barriers to discharge with patient and caregiver   Identify discharge learning needs (meds, wound care, etc)     Problem: Pain  Goal: Verbalizes/displays adequate comfort level or baseline comfort level  11/27/2024 2307 by Minor Penaloza RN  Outcome: Progressing  Flowsheets (Taken 11/27/2024 2307)  Verbalizes/displays adequate comfort level or baseline comfort level:   Encourage patient to monitor pain and request assistance   Assess pain using appropriate pain scale   Administer analgesics based on type and severity of pain and evaluate response   Implement non-pharmacological measures as appropriate and evaluate response     Problem: Safety - Adult  Goal: Free from fall injury  11/27/2024 2307 by Minor Penaloza RN  Outcome: Progressing  Flowsheets (Taken 11/27/2024 2307)  Free From Fall Injury: Instruct family/caregiver on patient safety     Problem: ABCDS Injury Assessment  Goal: Absence of physical injury  11/27/2024 2307 by Minor Penaloza RN  Outcome: Progressing  Flowsheets (Taken 11/27/2024 2307)  Absence of Physical Injury: Implement safety measures based on patient assessment     Problem: Neurosensory - Adult  Goal: Achieves stable or improved 
  Problem: Chronic Conditions and Co-morbidities  Goal: Patient's chronic conditions and co-morbidity symptoms are monitored and maintained or improved  11/30/2024 2307 by Minor Penaloza RN  Outcome: Progressing  Flowsheets (Taken 11/30/2024 2307)  Care Plan - Patient's Chronic Conditions and Co-Morbidity Symptoms are Monitored and Maintained or Improved: Monitor and assess patient's chronic conditions and comorbid symptoms for stability, deterioration, or improvement     Problem: Discharge Planning  Goal: Discharge to home or other facility with appropriate resources  11/30/2024 2307 by Minor Penaloza RN  Outcome: Progressing  Flowsheets (Taken 11/30/2024 2307)  Discharge to home or other facility with appropriate resources: Identify barriers to discharge with patient and caregiver     Problem: Pain  Goal: Verbalizes/displays adequate comfort level or baseline comfort level  11/30/2024 2307 by Minor Penalzoa RN  Outcome: Progressing  Flowsheets (Taken 11/30/2024 2307)  Verbalizes/displays adequate comfort level or baseline comfort level:   Encourage patient to monitor pain and request assistance   Assess pain using appropriate pain scale   Administer analgesics based on type and severity of pain and evaluate response   Implement non-pharmacological measures as appropriate and evaluate response     Problem: Safety - Adult  Goal: Free from fall injury  11/30/2024 2307 by Minor Penaloza RN  Outcome: Progressing  Flowsheets (Taken 11/30/2024 2307)  Free From Fall Injury: Instruct family/caregiver on patient safety     Problem: Skin/Tissue Integrity  Goal: Absence of new skin breakdown  Description: 1.  Monitor for areas of redness and/or skin breakdown  2.  Assess vascular access sites hourly  3.  Every 4-6 hours minimum:  Change oxygen saturation probe site  4.  Every 4-6 hours:  If on nasal continuous positive airway pressure, respiratory therapy assess nares and determine need for appliance change or 
  Problem: Chronic Conditions and Co-morbidities  Goal: Patient's chronic conditions and co-morbidity symptoms are monitored and maintained or improved  Outcome: Progressing  Flowsheets (Taken 11/18/2024 1514)  Care Plan - Patient's Chronic Conditions and Co-Morbidity Symptoms are Monitored and Maintained or Improved: Monitor and assess patient's chronic conditions and comorbid symptoms for stability, deterioration, or improvement     Problem: Discharge Planning  Goal: Discharge to home or other facility with appropriate resources  Outcome: Progressing  Flowsheets (Taken 11/18/2024 1514)  Discharge to home or other facility with appropriate resources:   Identify barriers to discharge with patient and caregiver   Arrange for needed discharge resources and transportation as appropriate     Problem: Safety - Adult  Goal: Free from fall injury  Outcome: Progressing  Flowsheets (Taken 11/18/2024 1514)  Free From Fall Injury: Instruct family/caregiver on patient safety     
  Problem: Chronic Conditions and Co-morbidities  Goal: Patient's chronic conditions and co-morbidity symptoms are monitored and maintained or improved  Outcome: Progressing  Flowsheets (Taken 11/20/2024 0052)  Care Plan - Patient's Chronic Conditions and Co-Morbidity Symptoms are Monitored and Maintained or Improved: Monitor and assess patient's chronic conditions and comorbid symptoms for stability, deterioration, or improvement     Problem: Discharge Planning  Goal: Discharge to home or other facility with appropriate resources  Outcome: Progressing  Flowsheets (Taken 11/20/2024 0052)  Discharge to home or other facility with appropriate resources: Identify barriers to discharge with patient and caregiver     Problem: Pain  Goal: Verbalizes/displays adequate comfort level or baseline comfort level  11/20/2024 0052 by Minor Penaloza RN  Outcome: Progressing  Flowsheets (Taken 11/20/2024 0052)  Verbalizes/displays adequate comfort level or baseline comfort level:   Encourage patient to monitor pain and request assistance   Assess pain using appropriate pain scale   Administer analgesics based on type and severity of pain and evaluate response   Implement non-pharmacological measures as appropriate and evaluate response   Notify Licensed Independent Practitioner if interventions unsuccessful or patient reports new pain     Problem: Safety - Adult  Goal: Free from fall injury  11/20/2024 0052 by Minor Penaloza RN  Outcome: Progressing  Flowsheets (Taken 11/20/2024 0052)  Free From Fall Injury: Instruct family/caregiver on patient safety     Problem: Skin/Tissue Integrity  Goal: Absence of new skin breakdown  Description: 1.  Monitor for areas of redness and/or skin breakdown  2.  Assess vascular access sites hourly  3.  Every 4-6 hours minimum:  Change oxygen saturation probe site  4.  Every 4-6 hours:  If on nasal continuous positive airway pressure, respiratory therapy assess nares and determine need for 
  Problem: Chronic Conditions and Co-morbidities  Goal: Patient's chronic conditions and co-morbidity symptoms are monitored and maintained or improved  Outcome: Progressing  Flowsheets (Taken 11/20/2024 2133)  Care Plan - Patient's Chronic Conditions and Co-Morbidity Symptoms are Monitored and Maintained or Improved: Monitor and assess patient's chronic conditions and comorbid symptoms for stability, deterioration, or improvement     Problem: Discharge Planning  Goal: Discharge to home or other facility with appropriate resources  11/21/2024 0053 by Alejandra Aggarwal RN  Outcome: Progressing  Flowsheets (Taken 11/20/2024 2133)  Discharge to home or other facility with appropriate resources: Identify barriers to discharge with patient and caregiver     Problem: Pain  Goal: Verbalizes/displays adequate comfort level or baseline comfort level  11/21/2024 0053 by Alejandra Aggarwal RN  Outcome: Progressing  Flowsheets (Taken 11/20/2024 2050)  Verbalizes/displays adequate comfort level or baseline comfort level: Encourage patient to monitor pain and request assistance     Problem: Safety - Adult  Goal: Free from fall injury  11/21/2024 0053 by Alejandra Aggarwal RN  Outcome: Progressing     Problem: Skin/Tissue Integrity  Goal: Absence of new skin breakdown  Description: 1.  Monitor for areas of redness and/or skin breakdown  2.  Assess vascular access sites hourly  3.  Every 4-6 hours minimum:  Change oxygen saturation probe site  4.  Every 4-6 hours:  If on nasal continuous positive airway pressure, respiratory therapy assess nares and determine need for appliance change or resting period.  Outcome: Progressing     Problem: Gastrointestinal - Adult  Goal: Maintains or returns to baseline bowel function  Recent Flowsheet Documentation  Taken 11/20/2024 2133 by Alejandra Aggarwal RN  Maintains or returns to baseline bowel function: Assess bowel function     
  Problem: Chronic Conditions and Co-morbidities  Goal: Patient's chronic conditions and co-morbidity symptoms are monitored and maintained or improved  Outcome: Progressing  Flowsheets (Taken 11/26/2024 1009 by Sagar Negrete, RN)  Care Plan - Patient's Chronic Conditions and Co-Morbidity Symptoms are Monitored and Maintained or Improved: Monitor and assess patient's chronic conditions and comorbid symptoms for stability, deterioration, or improvement     Problem: Discharge Planning  Goal: Discharge to home or other facility with appropriate resources  Outcome: Progressing  Flowsheets (Taken 11/26/2024 1009 by Sagar Negrete, RN)  Discharge to home or other facility with appropriate resources: Identify barriers to discharge with patient and caregiver     Problem: Pain  Goal: Verbalizes/displays adequate comfort level or baseline comfort level  11/27/2024 0950 by Amisha Quach RN  Outcome: Progressing  Flowsheets (Taken 11/27/2024 0129 by Becky Coto LPN)  Verbalizes/displays adequate comfort level or baseline comfort level:   Encourage patient to monitor pain and request assistance   Assess pain using appropriate pain scale   Implement non-pharmacological measures as appropriate and evaluate response  11/27/2024 0129 by Becky Coto LPN  Outcome: Progressing  Flowsheets (Taken 11/27/2024 0129)  Verbalizes/displays adequate comfort level or baseline comfort level:   Encourage patient to monitor pain and request assistance   Assess pain using appropriate pain scale   Implement non-pharmacological measures as appropriate and evaluate response     Problem: Safety - Adult  Goal: Free from fall injury  11/27/2024 0950 by Amisha Quach RN  Outcome: Progressing  Flowsheets (Taken 11/27/2024 0129 by Becky Coto LPN)  Free From Fall Injury: Instruct family/caregiver on patient safety  11/27/2024 0129 by Becky Coto LPN  Outcome: Progressing  Flowsheets (Taken 11/27/2024 0129)  Free From 
  Problem: Chronic Conditions and Co-morbidities  Goal: Patient's chronic conditions and co-morbidity symptoms are monitored and maintained or improved  Outcome: Progressing  Flowsheets (Taken 11/30/2024 1437)  Care Plan - Patient's Chronic Conditions and Co-Morbidity Symptoms are Monitored and Maintained or Improved:   Monitor and assess patient's chronic conditions and comorbid symptoms for stability, deterioration, or improvement   Collaborate with multidisciplinary team to address chronic and comorbid conditions and prevent exacerbation or deterioration     Problem: Discharge Planning  Goal: Discharge to home or other facility with appropriate resources  Outcome: Progressing  Flowsheets (Taken 11/30/2024 1437)  Discharge to home or other facility with appropriate resources:   Arrange for needed discharge resources and transportation as appropriate   Identify barriers to discharge with patient and caregiver   Identify discharge learning needs (meds, wound care, etc)     Problem: Pain  Goal: Verbalizes/displays adequate comfort level or baseline comfort level  11/30/2024 1437 by Cherise Mendez RN  Outcome: Progressing  Flowsheets (Taken 11/30/2024 1437)  Verbalizes/displays adequate comfort level or baseline comfort level:   Encourage patient to monitor pain and request assistance   Assess pain using appropriate pain scale   Administer analgesics based on type and severity of pain and evaluate response     Problem: Safety - Adult  Goal: Free from fall injury  11/30/2024 1437 by Cherise Mendez RN  Outcome: Progressing  Flowsheets (Taken 11/30/2024 1437)  Free From Fall Injury:   Instruct family/caregiver on patient safety   Based on caregiver fall risk screen, instruct family/caregiver to ask for assistance with transferring infant if caregiver noted to have fall risk factors     Problem: Skin/Tissue Integrity  Goal: Absence of new skin breakdown  11/30/2024 1437 by Cherise Mendez, RN  Outcome: Progressing  Note: 
  Problem: Discharge Planning  Goal: Discharge to home or other facility with appropriate resources  11/21/2024 1231 by Cathi Kam LISW-S  Note: Team conference held Thursday, 11/21. Recommendations of the team were explained to the patient and her daughter by Dr Dennis and JOEL. Team is recommending that patient continue on acute inpatient rehab for PT, OT and ST, with expected discharge date of Saturday, 11/30. Following discharge, the team's recommendations are undetermined at this time. Team conference will take place on Tuesday, 11/26 to discuss discharge planning. Care plan reviewed with patient and her daughter. Patient and his daughter verbalized understanding of the plan of care and contributed to goal setting. SW to follow and maintain involvement in discharge planning.      
  Problem: Discharge Planning  Goal: Discharge to home or other facility with appropriate resources  11/26/2024 1034 by Cathi Kam LISW-S  Note: Team conference held Tuesday, 11/26. Recommendations of the team were explained to the patient and her daughter, Ashley by Dr Dennis and JOEL. Team is recommending that patient continue on acute inpatient rehab for PT, OT and ST, with expected discharge date of Monday, 12/2. Following discharge, team is recommending SNF for continued therapy. Care plan reviewed with patient and Ashley. Patient and Ashley verbalized understanding of the plan of care and contributed to goal setting. Post-acute (PAC) provider list was provided to patient. Patient was informed of their freedom to choose PAC provider. Discussed and offered to show the patient the relevant PAC Providers quality and resource use measures on Medicare Compare web site via computer based on patient's goals of care and treatment preferences. Questions regarding selection process were answered.     JOEL received a call from Ashley reporting that they would like a referral to [facility]. JOEL answered Ashley's questions regarding Medicare benefits. No outstanding needs.    JOEL attempted to make a referral to [facility], but there was no answer. JOEL to try again on 11/27.    SW to follow and maintain involvement in discharge planning.      
  Problem: Discharge Planning  Goal: Discharge to home or other facility with appropriate resources  11/27/2024 1433 by Cathi Kam LISW-S  Note: SW spoke with Emilie at [facility] on this date. Referral was made for admission on Monday, 12/2 at [facility].    SW spoke with Addie at [facility]. She reported that they are able to accept patient for admission on Monday, 12/2.    SW met with patient to go over discharge planning. Patient is understanding of the referral to [facility] and is in agreement with the discharge plan. SW educated patient transport options. Patient requested  SW schedule transport for discharge. Patient reports feeling frustrated regarding her 's condition and not having answers at this time. SW provided supportive counseling.    SW spoke with patient's daughter, Ashley, on this date. SW updated Ashley on patient's acceptance to [facility]. SW informed her of patient's agreement with this plan and for SW to schedule transport on Monday, 12/2. SW to follow up with Ashley on 12/2 to inform her of transport time so Ashley can be present for patient's admission to [facility]. No outstanding needs at this time.    SW will follow and maintain involvement in discharge planning.      
  Problem: Discharge Planning  Goal: Discharge to home or other facility with appropriate resources  12/2/2024 0253 by Abbe Mendes, RN  Outcome: Progressing  Flowsheets (Taken 12/1/2024 1915)  Discharge to home or other facility with appropriate resources: Identify barriers to discharge with patient and caregiver     Problem: Pain  Goal: Verbalizes/displays adequate comfort level or baseline comfort level  12/2/2024 0253 by Abbe Mendes, RN  Outcome: Progressing  Flowsheets (Taken 12/1/2024 1915)  Verbalizes/displays adequate comfort level or baseline comfort level: Encourage patient to monitor pain and request assistance     Problem: Safety - Adult  Goal: Free from fall injury  12/2/2024 0253 by Abbe Mendes, RN  Outcome: Progressing     Problem: Skin/Tissue Integrity  Goal: Absence of new skin breakdown  Description: 1.  Monitor for areas of redness and/or skin breakdown  2.  Assess vascular access sites hourly  3.  Every 4-6 hours minimum:  Change oxygen saturation probe site  4.  Every 4-6 hours:  If on nasal continuous positive airway pressure, respiratory therapy assess nares and determine need for appliance change or resting period.  12/2/2024 0253 by Abbe Mendes, RN  Outcome: Progressing     
  Problem: Discharge Planning  Goal: Discharge to home or other facility with appropriate resources  Outcome: Progressing  Flowsheets (Taken 11/28/2024 0830 by Katie Ramirez, RN)  Discharge to home or other facility with appropriate resources:   Identify barriers to discharge with patient and caregiver   Arrange for needed discharge resources and transportation as appropriate   Identify discharge learning needs (meds, wound care, etc)   Refer to discharge planning if patient needs post-hospital services based on physician order or complex needs related to functional status, cognitive ability or social support system     Problem: Pain  Goal: Verbalizes/displays adequate comfort level or baseline comfort level  Outcome: Progressing    Problem: Safety - Adult  Goal: Free from fall injury  Outcome: Progressing    Problem: Skin/Tissue Integrity  Goal: Absence of new skin breakdown  Description: 1.  Monitor for areas of redness and/or skin breakdown  2.  Assess vascular access sites hourly  3.  Every 4-6 hours minimum:  Change oxygen saturation probe site  4.  Every 4-6 hours:  If on nasal continuous positive airway pressure, respiratory therapy assess nares and determine need for appliance change or resting period.  Outcome: Progressing  Note: Dressing changed ,skin intact,no redness or breakdowns.     Problem: Musculoskeletal - Adult  Goal: Maintain proper alignment of affected body part  Outcome: Progressing    Problem: Gastrointestinal - Adult  Goal: Maintains or returns to baseline bowel function  Outcome: Progressing  Flowsheets (Taken 11/29/2024 0242)  Maintains or returns to baseline bowel function:   Assess bowel function   Encourage oral fluids to ensure adequate hydration     Maintain proper alignment of affected body part: Support and protect limb and body alignment per provider's orders     Free From Fall Injury: Instruct family/caregiver on patient safety  Note: Call light in reach.   Verbalizes/displays 
  Problem: Pain  Goal: Verbalizes/displays adequate comfort level or baseline comfort level  11/20/2024 1343 by Radha Rodrigez LPN  Outcome: Progressing  Reposition frequently to alleviate pain.  Pt is using scheduled Tylenol and prn Tramadol      Problem: Safety - Adult  Goal: Free from fall injury  11/20/2024 1343 by Radha Rodrigez LPN  Outcome: Progressing       Problem: Neurosensory - Adult  Goal: Achieves stable or improved neurological status  11/20/2024 1343 by Radha Rodrigez LPN  Outcome: Progressing  Flowsheets (Taken 11/20/2024 0946)  Achieves stable or improved neurological status:   Assess for and report changes in neurological status   Maintain blood pressure and fluid volume within ordered parameters to optimize cerebral perfusion and minimize risk of hemorrhage   Monitor temperature, glucose, and sodium. Initiate appropriate interventions as ordered  Pt has live sitter today, pt doesn't seem as agitated, has been hallucinating by seeing animals and grabbing at things in the air but it has gotten better.            Problem: Gastrointestinal - Adult  Goal: Maintains or returns to baseline bowel function  11/20/2024 1343 by Radha Rodrigez LPN  Outcome: Progressing  Flowsheets (Taken 11/20/2024 0946)  Maintains or returns to baseline bowel function:   Assess bowel function   Encourage oral fluids to ensure adequate hydration   Encourage mobilization and activity   Nutrition consult to assist patient with appropriate food choices   Administer ordered medications as needed  Pt is having daily bowel movements        
  Problem: Pain  Goal: Verbalizes/displays adequate comfort level or baseline comfort level  Outcome: Progressing    Problem: Safety - Adult  Goal: Free from fall injury  Outcome: Progressing    Problem: Skin/Tissue Integrity  Goal: Absence of new skin breakdown  Description: 1.  Monitor for areas of redness and/or skin breakdown  2.  Assess vascular access sites hourly  3.  Every 4-6 hours minimum:  Change oxygen saturation probe site  4.  Every 4-6 hours:  If on nasal continuous positive airway pressure, respiratory therapy assess nares and determine need for appliance change or resting period.  Outcome: Progressing     Problem: Musculoskeletal - Adult  Goal: Return mobility to safest level of function  Outcome: Progressing  Flowsheets (Taken 11/30/2024 0158)  Return Mobility to Safest Level of Function:   Assess patient stability and activity tolerance for standing, transferring and ambulating with or without assistive devices   Assist with transfers and ambulation using safe patient handling equipment as needed   Ensure adequate protection for wounds/incisions during mobilization     Problem: Musculoskeletal - Adult  Goal: Maintain proper alignment of affected body part  Outcome: Progressing  Maintain proper alignment of affected body part: Support and protect limb and body alignment per provider's orders   Free From Fall Injury: Instruct family/caregiver on patient safety   Verbalizes/displays adequate comfort level or baseline comfort level:   Encourage patient to monitor pain and request assistance   Assess pain using appropriate pain scale   Administer analgesics based on type and severity of pain and evaluate response   Implement non-pharmacological measures as appropriate and evaluate response             
  Problem: Pain  Goal: Verbalizes/displays adequate comfort level or baseline comfort level  Outcome: Progressing  Flowsheets (Taken 11/27/2024 0129)  Verbalizes/displays adequate comfort level or baseline comfort level:   Encourage patient to monitor pain and request assistance   Assess pain using appropriate pain scale   Implement non-pharmacological measures as appropriate and evaluate response     Problem: Safety - Adult  Goal: Free from fall injury  Outcome: Progressing  Flowsheets (Taken 11/27/2024 0129)  Free From Fall Injury: Instruct family/caregiver on patient safety     Problem: Skin/Tissue Integrity  Goal: Absence of new skin breakdown  Description: 1.  Monitor for areas of redness and/or skin breakdown  2.  Assess vascular access sites hourly  3.  Every 4-6 hours minimum:  Change oxygen saturation probe site  4.  Every 4-6 hours:  If on nasal continuous positive airway pressure, respiratory therapy assess nares and determine need for appliance change or resting period.  Outcome: Progressing     Problem: Musculoskeletal - Adult  Goal: Return mobility to safest level of function  Outcome: Progressing  Flowsheets (Taken 11/27/2024 0129)  Return Mobility to Safest Level of Function:   Assess patient stability and activity tolerance for standing, transferring and ambulating with or without assistive devices   Assist with transfers and ambulation using safe patient handling equipment as needed   Obtain physical therapy/occupational therapy consults as needed     Problem: Musculoskeletal - Adult  Goal: Maintain proper alignment of affected body part  Outcome: Progressing  Flowsheets (Taken 11/27/2024 0129)  Maintain proper alignment of affected body part: Support and protect limb and body alignment per provider's orders     
  Problem: Safety - Adult  Goal: Free from fall injury  Outcome: Progressing  Pt will remain free of falls.  Pt is 1-2 assist with walker and gait belt     Problem: Pain  Goal: Verbalizes/displays adequate comfort level or baseline comfort level  Outcome: Progressing  Reposition frequently to alleviate pain.       Problem: Neurosensory - Adult  Goal: Achieves stable or improved neurological status  Outcome: Not Progressing  Pt is confused and agitated, medications have been changed     Problem: Gastrointestinal - Adult  Goal: Maintains or returns to baseline bowel function  Outcome: Progressing  Pt has not had bm today, GI medications have been given.           
Adult  Goal: Maintain proper alignment of affected body part  12/2/2024 1142 by Ivonne Castle LPN  Outcome: Completed     Problem: Musculoskeletal - Adult  Goal: Return ADL status to a safe level of function  12/2/2024 1142 by Ivonne Castle LPN  Outcome: Completed     Problem: Gastrointestinal - Adult  Goal: Maintains or returns to baseline bowel function  12/2/2024 1142 by Ivonne Castle LPN  Outcome: Completed     Problem: Nutrition Deficit:  Goal: Optimize nutritional status  12/2/2024 1142 by Ivonne Castle LPN  Outcome: Completed     
Comments: Pt reports indep with all homemaking. Pt states she does all the cooking or they eat out. Spouse does assist with dishes and other homemaking tasks.  Additional Comments: Pt reports she was IND prior to recent back sx with use of single point cane. Per notes on acute care,  is not able to assist her physically but is currently taking care of the housework while she is recovering.    Contact/Guardian Information: Izaiah Gomez (spoue) [phone], Serafin Gomez( child) [phone]    Community Resources Utilized: Patient was not using community resources prior to admission.    Sexuality/Intimacy: Patient did not disclose sexuality/intimacy concerns.    Complementary Health Approaches: Patient did not disclose desires towards complementary health approaches.     Anticipated Needs/Discharge Plans: SW will follow and maintain involvement in discharge planning.     SW met with patient and family on this date to introduce self, complete SW assessment and initiate discharge planning. Prior to admission, patient was living with her , Izaiah. Patient reported being independent at home. Patient was completing her ADLs, meal prep, some housekeeping, errands, finances and driving. Izaiah is able to complete some light housekeeping, but is unable to provide physical assistance at home. Patient's support includes Izaiah and their son, Serafin, who lives locally. Patient also has a son and daughter who do not live locally. Patient's family practitioner is JOHNNY Corona. Patient prefers [facility]. Patient reports having a walker at home. Patient is motivated to participate in therapy. SW educated patient on Thursday, 11/21 team conference. SW will follow and maintain involvement in discharge planning.        Read-Only, Retired: Discharge Planning  Living Arrangements: Spouse/Significant Other  Support Systems: Spouse/Significant Other  Potential Assistance Needed: Home Care  M2B Y/N: Yes  Type of Home Care 
day             
injury  Outcome: Progressing  Flowsheets (Taken 12/1/2024 1753)  Free From Fall Injury: Instruct family/caregiver on patient safety     Problem: Skin/Tissue Integrity  Goal: Absence of new skin breakdown  Description: 1.  Monitor for areas of redness and/or skin breakdown  2.  Assess vascular access sites hourly  3.  Every 4-6 hours minimum:  Change oxygen saturation probe site  4.  Every 4-6 hours:  If on nasal continuous positive airway pressure, respiratory therapy assess nares and determine need for appliance change or resting period.  Outcome: Progressing     Problem: ABCDS Injury Assessment  Goal: Absence of physical injury  Outcome: Progressing  Flowsheets (Taken 12/1/2024 1753)  Absence of Physical Injury: Implement safety measures based on patient assessment     Problem: Neurosensory - Adult  Goal: Achieves stable or improved neurological status  Outcome: Progressing     Problem: Neurosensory - Adult  Goal: Achieves maximal functionality and self care  Outcome: Progressing     Problem: Respiratory - Adult  Goal: Achieves optimal ventilation and oxygenation  Outcome: Progressing     Problem: Musculoskeletal - Adult  Goal: Return mobility to safest level of function  Outcome: Progressing  Flowsheets (Taken 12/1/2024 1753)  Return Mobility to Safest Level of Function:   Assess patient stability and activity tolerance for standing, transferring and ambulating with or without assistive devices   Assist with transfers and ambulation using safe patient handling equipment as needed   Ensure adequate protection for wounds/incisions during mobilization   Obtain physical therapy/occupational therapy consults as needed   Apply continuous passive motion per provider or physical therapy orders to increase flexion toward goal   Instruct patient/family in ordered activity level     Problem: Nutrition Deficit:  Goal: Optimize nutritional status  Outcome: Progressing   Care plan reviewed with patient.  Patient verbalizes 
resting period.  11/25/2024 2154 by Minor Penaloza RN  Outcome: Progressing     Problem: ABCDS Injury Assessment    Problem: Neurosensory - Adult  Goal: Achieves stable or improved neurological status  11/25/2024 2154 by Minor Penaloza RN  Outcome: Progressing  Flowsheets (Taken 11/25/2024 2154)  Achieves stable or improved neurological status: Assess for and report changes in neurological status     Problem: Neurosensory - Adult  Goal: Achieves maximal functionality and self care  11/25/2024 2154 by Minor Penaloza RN  Outcome: Progressing  Flowsheets (Taken 11/25/2024 2154)  Achieves maximal functionality and self care: Monitor swallowing and airway patency with patient fatigue and changes in neurological status     Problem: Musculoskeletal - Adult  Goal: Maintain proper alignment of affected body part  11/25/2024 2154 by Minor Penaloza RN  Outcome: Progressing  Flowsheets (Taken 11/25/2024 2154)  Maintain proper alignment of affected body part:   Support and protect limb and body alignment per provider's orders   Instruct and reinforce with patient and family use of appropriate assistive device and precautions (e.g. spinal or hip dislocation precautions)     Problem: Musculoskeletal - Adult  Goal: Return ADL status to a safe level of function  11/25/2024 2154 by Minor Penaloza RN  Outcome: Progressing  Flowsheets (Taken 11/25/2024 2154)  Return ADL Status to a Safe Level of Function: Administer medication as ordered     Problem: Gastrointestinal - Adult  Goal: Maintains or returns to baseline bowel function  11/25/2024 2154 by Minor Penaloza RN  Outcome: Progressing  Flowsheets (Taken 11/25/2024 2154)  Maintains or returns to baseline bowel function: Assess bowel function     Problem: Nutrition Deficit:  Goal: Optimize nutritional status  11/25/2024 2154 by Minor Penaloza RN  Outcome: Progressing  Flowsheets (Taken 11/25/2024 2154)  Nutrient intake appropriate for improving, restoring, 
to monitor pain and request assistance   Assess pain using appropriate pain scale     Problem: Safety - Adult  Goal: Free from fall injury  11/23/2024 1133 by Sagar Negrete RN  Outcome: Progressing  Flowsheets (Taken 11/23/2024 1130)  Free From Fall Injury: Instruct family/caregiver on patient safety  11/23/2024 0124 by Becky Coto LPN  Outcome: Progressing     Problem: Skin/Tissue Integrity  Goal: Absence of new skin breakdown  Description: 1.  Monitor for areas of redness and/or skin breakdown  2.  Assess vascular access sites hourly  3.  Every 4-6 hours minimum:  Change oxygen saturation probe site  4.  Every 4-6 hours:  If on nasal continuous positive airway pressure, respiratory therapy assess nares and determine need for appliance change or resting period.  11/23/2024 1133 by Sagar Negrete RN  Outcome: Progressing  11/23/2024 0124 by Becky Coto LPN  Outcome: Progressing     Problem: ABCDS Injury Assessment  Goal: Absence of physical injury  Outcome: Progressing  Flowsheets (Taken 11/23/2024 1130)  Absence of Physical Injury: Implement safety measures based on patient assessment     Problem: Neurosensory - Adult  Goal: Achieves stable or improved neurological status  11/23/2024 1133 by Sagar Negrete RN  Outcome: Progressing  Flowsheets (Taken 11/23/2024 0907)  Achieves stable or improved neurological status: Assess for and report changes in neurological status  11/23/2024 0124 by Becky Coto LPN  Outcome: Progressing  Flowsheets (Taken 11/21/2024 0922 by Tipton, Rylee, LPN)  Achieves stable or improved neurological status:   Assess for and report changes in neurological status   Initiate measures to prevent increased intracranial pressure   Maintain blood pressure and fluid volume within ordered parameters to optimize cerebral perfusion and minimize risk of hemorrhage   Monitor temperature, glucose, and sodium. Initiate appropriate interventions as ordered  Goal: Achieves maximal 
Progressing  Flowsheets (Taken 11/22/2024 0918)  Achieves optimal ventilation and oxygenation: Assess for changes in respiratory status     Problem: Musculoskeletal - Adult  Goal: Return mobility to safest level of function  11/21/2024 2156 by Minor Penaloza RN  Outcome: Progressing  Flowsheets (Taken 11/21/2024 2156)  Return Mobility to Safest Level of Function: Assess patient stability and activity tolerance for standing, transferring and ambulating with or without assistive devices  Goal: Maintain proper alignment of affected body part  11/21/2024 2156 by Minor Penaloza RN  Outcome: Progressing  Flowsheets (Taken 11/21/2024 2156)  Maintain proper alignment of affected body part:   Support and protect limb and body alignment per provider's orders   Instruct and reinforce with patient and family use of appropriate assistive device and precautions (e.g. spinal or hip dislocation precautions)  Goal: Return ADL status to a safe level of function  11/22/2024 1110 by Sagar Negrete RN  Outcome: Progressing  Flowsheets (Taken 11/22/2024 0918)  Return ADL Status to a Safe Level of Function:   Assess activities of daily living deficits and provide assistive devices as needed   Administer medication as ordered  11/21/2024 2156 by Minor Penaloza RN  Outcome: Progressing  Flowsheets (Taken 11/21/2024 2156)  Return ADL Status to a Safe Level of Function: Administer medication as ordered     Problem: Gastrointestinal - Adult  Goal: Maintains or returns to baseline bowel function  11/22/2024 1110 by Sagar Negrete RN  Outcome: Progressing  Flowsheets (Taken 11/22/2024 0918)  Maintains or returns to baseline bowel function: Assess bowel function  11/21/2024 2156 by Minor Penaloza RN  Outcome: Progressing  Flowsheets (Taken 11/21/2024 2156)  Maintains or returns to baseline bowel function: Assess bowel function     Problem: Nutrition Deficit:  Goal: Optimize nutritional status  11/22/2024 1110 by Sagar Negrete 
therapy   Monitor swallowing and airway patency with patient fatigue and changes in neurological status     Achieves stable or improved neurological status:   Assess for and report changes in neurological status   Initiate measures to prevent increased intracranial pressure   Maintain blood pressure and fluid volume within ordered parameters to optimize cerebral perfusion and minimize risk of hemorrhage   Monitor temperature, glucose, and sodium. Initiate appropriate interventions as ordered     Care Plan - Patient's Chronic Conditions and Co-Morbidity Symptoms are Monitored and Maintained or Improved: Monitor and assess patient's chronic conditions and comorbid symptoms for stability, deterioration, or improvement  Note: Patient alert and talkative this evening.     
with or without assistive devices   Assist with transfers and ambulation using safe patient handling equipment as needed   Ensure adequate protection for wounds/incisions during mobilization   Apply continuous passive motion per provider or physical therapy orders to increase flexion toward goal   Instruct patient/family in ordered activity level    Problem: Musculoskeletal - Adult  Goal: Maintain proper alignment of affected body part  Outcome: Progressing  Flowsheets  Taken 11/21/2024 0922 by Tipton, Rylee, LPN  Maintain proper alignment of affected body part:   Support and protect limb and body alignment per provider's orders   Instruct and reinforce with patient and family use of appropriate assistive device and precautions (e.g. spinal or hip dislocation precautions)    Problem: Musculoskeletal - Adult  Goal: Return ADL status to a safe level of function  Outcome: Progressing  Flowsheets  Taken 11/21/2024 0922 by Tipton, Rylee, LPN  Return ADL Status to a Safe Level of Function:   Assess activities of daily living deficits and provide assistive devices as needed   Administer medication as ordered   Assist and instruct patient to increase activity and self care as tolerated    Problem: Gastrointestinal - Adult  Goal: Maintains or returns to baseline bowel function  Outcome: Progressing  Flowsheets  Taken 11/21/2024 0922 by Tipton, Rylee, LPN  Maintains or returns to baseline bowel function:   Assess bowel function   Encourage oral fluids to ensure adequate hydration   Administer ordered medications as needed   Encourage mobilization and activity    Problem: Nutrition Deficit:  Goal: Optimize nutritional status  Outcome: Progressing  Flowsheets (Taken 11/21/2024 0938)  Nutrient intake appropriate for improving, restoring, or maintaining nutritional needs: Monitor oral intake, labs, and treatment plans              
Musculoskeletal - Adult  Goal: Maintain proper alignment of affected body part  11/29/2024 0932 by Annie Ventura, RN  Outcome: Progressing  Flowsheets (Taken 11/28/2024 0830 by Katie Ramirez, RN)  Maintain proper alignment of affected body part: Support and protect limb and body alignment per provider's orders  11/29/2024 0242 by Becky Coto LPN  Outcome: Progressing  Flowsheets (Taken 11/28/2024 0830 by Katie Ramirez, RN)  Maintain proper alignment of affected body part: Support and protect limb and body alignment per provider's orders     Problem: Musculoskeletal - Adult  Goal: Return ADL status to a safe level of function  Outcome: Progressing  Flowsheets (Taken 11/28/2024 0830 by Katie Ramirez, RN)  Return ADL Status to a Safe Level of Function:   Administer medication as ordered   Assess activities of daily living deficits and provide assistive devices as needed   Obtain physical therapy/occupational therapy consults as needed   Assist and instruct patient to increase activity and self care as tolerated     Problem: Gastrointestinal - Adult  Goal: Maintains or returns to baseline bowel function  11/29/2024 0932 by Annie Ventura, RN  Outcome: Progressing  Flowsheets (Taken 11/29/2024 0242 by Becky Coto LPN)  Maintains or returns to baseline bowel function:   Assess bowel function   Encourage oral fluids to ensure adequate hydration  11/29/2024 0242 by Becky Coto LPN  Outcome: Progressing  Flowsheets (Taken 11/29/2024 0242)  Maintains or returns to baseline bowel function:   Assess bowel function   Encourage oral fluids to ensure adequate hydration     Problem: Nutrition Deficit:  Goal: Optimize nutritional status  Flowsheets (Taken 11/27/2024 2307 by Minor Penaloza, RN)  Nutrient intake appropriate for improving, restoring, or maintaining nutritional needs:   Assess nutritional status and recommend course of action   Monitor oral intake, labs, and treatment plans     
11/24/2024 2156 by Minor Penaloza RN)  Nutrient intake appropriate for improving, restoring, or maintaining nutritional needs: Monitor oral intake, labs, and treatment plans  11/24/2024 2156 by Minor Penaloza RN  Flowsheets (Taken 11/24/2024 2156)  Nutrient intake appropriate for improving, restoring, or maintaining nutritional needs: Monitor oral intake, labs, and treatment plans     
Respiratory needs, and Anemia                                           Physician anticipated functional outcomes: Improved independence with functional measures   Estimated length of stay for this admission 2-3 weeks  Medical Prognosis: Fair  Anticipated disposition: Home.      The potential to achieve the above medical and rehabilitative goals is fair.    This plan of care has been developed with the assistance and input of the multidisciplinary rehabilitation team.  The plan was reviewed with the patient.  The patient has had the opportunity to provide input to the therapy team.    I have reviewed this Individualized Plan of Care and agree with its contents.  Above documentation has been expanded, modified, adjusted to reflect the findings of my evaluations and goals for the patient.    Physician:  Cass Dennis DO      Electronically signed by Cass Dennis DO on 11/19/2024 at 4:39 PM        
restoring, or maintaining nutritional needs:   Assess nutritional status and recommend course of action   Monitor oral intake, labs, and treatment plans

## 2024-12-02 NOTE — CARE COORDINATION
Patient oriented and calm. With minor pain, medication provided. She's scheduled for discharge today, with good spirit. To the bathroom with 1 assist via walker, continent of bowel and bladder. Electronically signed by Abbe Mendes RN on 12/2/2024 at 6:40 AM

## 2024-12-03 ENCOUNTER — CARE COORDINATION (OUTPATIENT)
Dept: CARE COORDINATION | Age: 74
End: 2024-12-03

## 2024-12-04 NOTE — PROGRESS NOTES
Per [facility] approved formulary policy.     SGLT2's are only formulary with the indication of CKD or CHF therefore:    Please note that the  Empagliflozin (Jardiance) is non-formulary with indications of type 2 diabetes and has been discontinued while inpatient. If you feel the patient needs to continue their home therapy during the inpatient stay, the patient may bring their medication bottle for verification and administration pursuant to our home medication use policy.      Please contact the pharmacy with any questions or concerns.  Thank you.    Princess Butts RPH  11/18/2024 2:22 PM    
   Physical Medicine & Rehabilitation   Progress Note    Chief Complaint:  Lumbar stenosis with radiculopathy     Subjective: Patient seen and examined.  Pain medication/muscle relaxer regimen adjusted yesterday.  Patient moved to a room closer to the nurses station. Reportedly agitated again overnight. She received a total of 50 mg of Seroquel overnight. No reported hallucinations since she moved to new room. Seems to be tolerating the medication changes, however, she was reportedly asking for pain medication frequently overnight. Want to limit pain medication as much as we can to see if that improved cognition. Can consider need to increase frequency vs. strength of tramadol. AT time of exam she states that pain is okay at rest but worsens with activity. No reports of any CP or SOB. No reports of any significant changes to bowel or bladder. Nursing reports a large BM yesterday.       Rehabilitation:   PT 11/19/2024:  Balance:  Static Standing Balance: Patient able to stand with RW at close SBA. Difficulty achieving full upright posture due to reported back pain. Pt with heavy reliance on RW with arms stating she has probably leaned over for too long that she forgets what it is like to stand up straight. Attempted to take one hand off the walker so patient could manage depends and participate in pericare, but unable to tolerate standing greater than 10 seconds with 1 arm off walker due to reported pain in back. Pt needed CGA for balance when taking one hand off walker with increased tendency for right knee to buckle. Pt did attempt to remove both hands from walker to pull depends up, but needed increased assist to prevent LOB so repeated cues to keep at least 1 hand on walker for safety. Repeat cues needed, patient with decreased safety awareness.   Dynamic Standing Balance:  Pt needed Min A for dynamic balance, reaching outside EDU with 1 hand on walker and to reach down to knee level with 1 UE support. Decreased 
   Physical Medicine & Rehabilitation   Progress Note    Chief Complaint:  Lumbar stenosis with radiculopathy     Subjective: Patient seen and examined. No acute events overnight.  She reports that she feels like she \"lost 2 weeks\" of therapy and feels like she needs another 7-10 days before being ready to go home.  She states that she was not expecting to be \"knocked on my back as hard as I was) following the back surgery.  Additionally, her  neva is in the hospital, therefore, she knows she is unsafe to return home at this time.  We discussed the prior plan for subacute rehab stay to allow for longer period of rehab and recovery.  She denies any chest pain or shortness of breath.  No reports of any significant changes to bowel or bladder.  Last documented BM was on 11/28/2024.      Nursing is reporting that she is complaining of some pain in her right hip and requested Voltaren gel.    Rehabilitation:   PT 11/27/2024:  Transfers:  Sit to Stand: Contact Guard Assistance  Stand to Sit:Stand By Assistance, Contact Guard Assistance, X 1     Ambulation:  Contact Guard Assistance  Distance: 10ft x1, ~20ft x2  Surface: Level Tile  Device: Rolling Walker  Gait Deviations: Forward Flexed Posture, Slow Holley, Decreased Step Length Bilaterally, Decreased Weight Shift Right, Decreased Gait Speed, Decreased Heel Strike on Left, Decreased Heel Strike Bilaterally, Decreased Foot Clearance Left, Mild Path Deviations, Decreased Terminal Knee Extension, and Increased reliance on assistive device   *required lengthy seated rest break following each bout     Stairs:  Platform: 2\" platform X 2 using Parallel Bars and Contact Guard Assistance.   Platform: 2\" platform X 1 using Rolling Walker and Contact Guard Assistance.   Platform: 4\" platform X 2 using Parallel Bars and Contact Guard Assistance.  Platform: 4\" platform X 1 using Rolling Walker and Contact Guard Assistance, Minimal Assistance, X 1.  *required cues for 
   Physical Medicine & Rehabilitation   Progress Note    Chief Complaint:  Lumbar stenosis with radiculopathy     Subjective: Patient seen and examined. No acute events overnight. She reports good sleep overnight. Nursing and live sitter report that they were told the night went okay. No reports of any CP or SOB. No reports of any significant changes to bowel or bladder. She is reporting some pain that wraps around her right side intermittently- she attributes this to her brief being too tight. No tenderness to palpation. She denies any dysuria. UA a couple days ago was negative. She is tolerating therapies.       Rehabilitation:  PT 11/21/2024:  Bed Mobility:  Sit to Supine: Moderate Assistance, with head of bed raised, with increased time for completion   Sit > supine with modA for BLE management. Mildly elevated HOB. Performed via log roll method.      Transfers:  Sit to Stand: Contact Guard Assistance  Stand to Sit:Contact Guard Assistance  To/From Bed and Chair: Contact Guard Assistance  STSs performed throughout session to RW. Observation of good hand placement.   Occasional decreased eccentric control, pt will correct with cueing.      Ambulation:  Contact Guard Assistance  Distance: 16ft x 1, 30ft x 1, 18ft x 1  Surface: Level Tile  Device: Rolling Walker  Gait Deviations: Forward Flexed Posture, Slow Holley, Decreased Step Length Bilaterally, Decreased Arm Swing, Decreased Trunk Rotation, Decreased Gait Speed, Increased reliance on assistive device, and Trendelenburg Gait      Balance:  Static Sitting Balance:  Independent  Dynamic Sitting Balance: Independent  Static Standing Balance: Modified Independent  Dynamic Standing Balance: Contact Guard Assistance  Sitting balance performed EOB and on toilet. Pt is modA for lower body dressing and SBA for toileting hygiene.   Standing balance performed with RW support.      Exercise:  Standing strengthening with emphasis on R hip abduction for improved 
   Physical Medicine & Rehabilitation   Progress Note    Chief Complaint:  Lumbar stenosis with radiculopathy     Subjective: Patient seen independently this morning and then again on rounds with ANASTASIA Demarco, following patient's inpatient Rehab Team Conference.  Patient is seen with daughter at bedside.  Nursing, patient, and daughter all states the patient had a rather uneventful night.  Daughter reports that she was up several times throughout the night to use the restroom.  Patient states this is not abnormal for her.  She continues to have pain but pain is improved.  Patient seems to be more clear in conversation.  No further reports of hallucinations.  No reports of any chest pain or shortness of breath.  No reports of any significant changes to bowel or bladder.    Of note, I did speak with patient and daughter yesterday evening.  My hopes was to take gain some insight to patient's cognitive baseline.  Daughter did state that she felt like she was doing better than she had on prior days.  Daughter was not very vocal about concerns, however, when probing did allude to the fact that patient is the kind of person who will say what ever she is thinking and is not shy to let people know how she feels.  Patient herself states \"I know I am very impatient\".  So it appears that some of what we have seen may be baseline personality exacerbated by the pain medications.      Rehabilitation:PT, OT, and SLP updates reviewed during team rounds. See separate note.           Review of Systems:  CONSTITUTIONAL:  negative for  fevers and chills  EYES:  negative for  glasses  HEENT:  negative for  hearing loss  RESPIRATORY:  negative for  dyspnea and wheezing  CARDIOVASCULAR:  negative for  chest pain  GASTROINTESTINAL:  negative for nausea, vomiting, and diarrhea  GENITOURINARY:  negative for dysuria  SKIN:  negative for rash  MUSCULOSKELETAL:  positive for  arthralgias, pain, and decreased range of 
   Physical Medicine & Rehabilitation   Progress Note    Chief Complaint:  Lumbar stenosis with radiculopathy     Subjective: Patient seen independently this morning and then again on rounds with ANASTASIA Demarco, following patient's inpatient Rehab Team Conference. Patient reports that overall she is feeling \"not bad\". She describes ongoing low back pain which she describes as someone pounding on a brick. She denies any CP or SOB. No reports of any CP or SOB. No reports of any significant changes to bowel or bladder. She continues to tolerate therapies.       Rehabilitation: PT, OT, and SLP updates reviewed during team rounds. See separate note.       Review of Systems:  CONSTITUTIONAL:  negative for  fevers and chills  EYES:  negative for  glasses  HEENT:  negative for  hearing loss  RESPIRATORY:  negative for  dyspnea and wheezing  CARDIOVASCULAR:  negative for  chest pain  GASTROINTESTINAL:  negative for nausea, vomiting, and diarrhea  GENITOURINARY:  negative for dysuria  SKIN:  negative for rash  MUSCULOSKELETAL:  positive for  arthralgias, pain, and decreased range of motion  NEUROLOGICAL:  positive for gait problems and pain; negative for paresthesias  System review otherwise negative      Objective:  BP (!) 109/59   Pulse 85   Temp 98.4 °F (36.9 °C) (Oral)   Resp 16   Ht 1.6 m (5' 3\")   Wt 74.7 kg (164 lb 10.9 oz)   SpO2 97%   BMI 29.17 kg/m²   Patient is awake and alert, she is sitting up in wheelchair   Orientation: She is oriented within conversation  Mood/affect: Calm  General appearance: Patient is well nourished, well developed, well groomed and in no acute distress     Memory:   Not formally assessed, however, patient is able to provide basic history  Attention/Concentration: Patient follows content of conversation  Language:  normal     Cranial Nerves:  cranial nerves II-XII are grossly intact  ROM:  abnormal -BLE  Motor Exam: BLE: 5/5 KE, 5/5 strength bilateral ankle dorsiflexion 
 [facility]  INPATIENT PHYSICAL THERAPY  PROGRESS NOTE  [facility] - [id number]-01/[id number]      Discharge Recommendations: Continue to assess pending progress and Patient would benefit from continued PT at discharge  Equipment Recommendations:Equipment Needed: No (pt has RW and cane)      Time In: 702  Time Out: 832  Timed Code Treatment Minutes: 90 Minutes  Minutes: 90          Date: 2024  Patient Name: Sadia Gomez,  Gender:  female        MRN: [id number]  : 1950  (74 y.o.)     Referring Practitioner: Cass Dennis DO  Diagnosis: Spinal stenosis of lumbar region with radiculopathy  Additional Pertinent Hx: Per EMR:Sadia Gomez is a 74 y.o. female with PMH significant for HTN, DM, and sleep apnea who was admitted to [facility] on 2024. She was admitted to acute IPR unit on 2024. Patient presented to [facility] on 2024 and underwent an elective T10-L2 decompression and fusion extension to T10, instrumentation to T11.  Patient reportedly tolerated procedure well.  Dr. Sullivan consulted for medical management.  Patient cleared for mobility with TLSO.  Therapy evaluated patient.  PM&R was consulted and ultimately patient determined to be a good candidate for an acute IPR stay in order to maximize her functional progress and independence prior to returning home.On acute care:Son reports confusion which he states started a couple days ago. He states they adjusted several of her pain medications in acute care hoping that would help. He states that initially after surgery she did not seem this confused but it seems to have worsened over the past couple of days. She states that she will give incorrect answer and she immediately knows that  it is not what she meant to say Patient attempted to get up several times on her own on arrival to rehab despite education. Due to confusion a telesitter was put in place.     Prior Level of Function:  Lives With: 
 [facility]  INPATIENT PHYSICAL THERAPY  DAILY NOTE  [facility] - [id number]-01/[id number]      Discharge Recommendations: Continue to assess pending progress, Subacte/Skilled Nursing Facility vs Home with Home Health PT  Equipment Recommendations: No (pt has RW and cane)               Time In: 0730  Time Out: 0900  Timed Code Treatment Minutes: 90 Minutes  Minutes: 90          Date: 2024  Patient Name: Sadia Gomez,  Gender:  female        MRN: [id number]  : 1950  (74 y.o.)     Referring Practitioner: Cass Dennis DO  Diagnosis: Spinal stenosis of lumbar region with radiculopathy  Additional Pertinent Hx: Per EMR:Sadia Gomez is a 74 y.o. female with PMH significant for HTN, DM, and sleep apnea who was admitted to [facility] on 2024. She was admitted to acute IPR unit on 2024. Patient presented to [facility] on 2024 and underwent an elective T10-L2 decompression and fusion extension to T10, instrumentation to T11.  Patient reportedly tolerated procedure well.  Dr. Sullivan consulted for medical management.  Patient cleared for mobility with TLSO.  Therapy evaluated patient.  PM&R was consulted and ultimately patient determined to be a good candidate for an acute IPR stay in order to maximize her functional progress and independence prior to returning home.On acute care:Son reports confusion which he states started a couple days ago. He states they adjusted several of her pain medications in acute care hoping that would help. He states that initially after surgery she did not seem this confused but it seems to have worsened over the past couple of days. She states that she will give incorrect answer and she immediately knows that  it is not what she meant to say Patient attempted to get up several times on her own on arrival to rehab despite education. Due to confusion a telesitter was put in place.     Prior Level of Function:  Lives With: 
 [facility]  INPATIENT PHYSICAL THERAPY  DAILY NOTE  [facility] - [id number]-01/[id number]      Discharge Recommendations: Continue to assess pending progress  Equipment Recommendations: No (pt has RW and cane)               Time In: 0700  Time Out: 0730  Timed Code Treatment Minutes: 30 Minutes  Minutes: 30          Date: 2024  Patient Name: Sadia Gomez,  Gender:  female        MRN: [id number]  : 1950  (74 y.o.)     Referring Practitioner: Cass Dennis DO  Diagnosis: Spinal stenosis of lumbar region with radiculopathy  Additional Pertinent Hx: Per EMR:Sadia Gomez is a 74 y.o. female with PMH significant for HTN, DM, and sleep apnea who was admitted to [facility] on 2024. She was admitted to acute IPR unit on 2024. Patient presented to [facility] on 2024 and underwent an elective T10-L2 decompression and fusion extension to T10, instrumentation to T11.  Patient reportedly tolerated procedure well.  Dr. Sullivan consulted for medical management.  Patient cleared for mobility with TLSO.  Therapy evaluated patient.  PM&R was consulted and ultimately patient determined to be a good candidate for an acute IPR stay in order to maximize her functional progress and independence prior to returning home.On acute care:Son reports confusion which he states started a couple days ago. He states they adjusted several of her pain medications in acute care hoping that would help. He states that initially after surgery she did not seem this confused but it seems to have worsened over the past couple of days. She states that she will give incorrect answer and she immediately knows that  it is not what she meant to say Patient attempted to get up several times on her own on arrival to rehab despite education. Due to confusion a telesitter was put in place.     Prior Level of Function:  Lives With: Spouse  Type of Home: House  Home Layout: One level  Home Access: 
 [facility]  INPATIENT PHYSICAL THERAPY  DAILY NOTE  [facility] - [id number]-01/[id number]      Discharge Recommendations: Home with Outpatient PT  Equipment Recommendations: No (pt has RW and cane)               Time In: 1330  Time Out: 1430  Timed Code Treatment Minutes: 60 Minutes  Minutes: 60          Date: 2024  Patient Name: Sadia Gomez,  Gender:  female        MRN: [id number]  : 1950  (74 y.o.)     Referring Practitioner: Cass Dennis DO  Diagnosis: Spinal stenosis of lumbar region with radiculopathy  Additional Pertinent Hx: Per EMR:Sadia Gomez is a 74 y.o. female with PMH significant for HTN, DM, and sleep apnea who was admitted to [facility] on 2024. She was admitted to acute IPR unit on 2024. Patient presented to [facility] on 2024 and underwent an elective T10-L2 decompression and fusion extension to T10, instrumentation to T11.  Patient reportedly tolerated procedure well.  Dr. Sullivan consulted for medical management.  Patient cleared for mobility with TLSO.  Therapy evaluated patient.  PM&R was consulted and ultimately patient determined to be a good candidate for an acute IPR stay in order to maximize her functional progress and independence prior to returning home.On acute care:Son reports confusion which he states started a couple days ago. He states they adjusted several of her pain medications in acute care hoping that would help. He states that initially after surgery she did not seem this confused but it seems to have worsened over the past couple of days. She states that she will give incorrect answer and she immediately knows that  it is not what she meant to say Patient attempted to get up several times on her own on arrival to rehab despite education. Due to confusion a telesitter was put in place.     Prior Level of Function:  Lives With: Spouse  Type of Home: House  Home Layout: One level  Home Access: Stairs to 
 [facility]  INPATIENT PHYSICAL THERAPY  DISCHARGENOTE  [facility]      Date: 12/3/2024  Patient Name: Sadia Gomez,  Gender:  female        MRN: [id number]  : 1950  (74 y.o.)     Referring Practitioner: Cass Dennis DO  Diagnosis: Spinal stenosis of lumbar region with radiculopathy  Additional Pertinent Hx: Per EMR:Sadia Gomez is a 74 y.o. female with PMH significant for HTN, DM, and sleep apnea who was admitted to [facility] on 2024. She was admitted to acute IPR unit on 2024. Patient presented to [facility] on 2024 and underwent an elective T10-L2 decompression and fusion extension to T10, instrumentation to T11.  Patient reportedly tolerated procedure well.  Dr. Sullivan consulted for medical management.  Patient cleared for mobility with TLSO.  Therapy evaluated patient.  PM&R was consulted and ultimately patient determined to be a good candidate for an acute IPR stay in order to maximize her functional progress and independence prior to returning home.On acute care:Son reports confusion which he states started a couple days ago. He states they adjusted several of her pain medications in acute care hoping that would help. He states that initially after surgery she did not seem this confused but it seems to have worsened over the past couple of days. She states that she will give incorrect answer and she immediately knows that  it is not what she meant to say Patient attempted to get up several times on her own on arrival to rehab despite education. Due to confusion a telesitter was put in place.     Restrictions/Precautions:  Restrictions/Precautions: General Precautions, Fall Risk, Surgical Protocols            Position Activity Restriction  Spinal Precautions: No Bending, No Lifting, No Twisting  Other position/activity restrictions: .    Required Braces or Orthoses  Spinal: Thoracic Lumbar Sacral 
 Pt grimacing while trying to get up out of recliner chair.Right hip is painful to her .Medications given at this time for pain,ambulated slowly to the restroom and done well ,but stated her body just can't do it.She felt better after she sat down on the toilet. Incentive spirometer used during this time also to expand lungs and used it well..  
 [facility]  INPATIENT PHYSICAL THERAPY  DAILY NOTE  [facility] - [id number]-01/[id number]      Discharge Recommendations: Continue to assess pending progress  Equipment Recommendations: No (pt has RW and cane)               Time In: 1500  Time Out: 1630  Timed Code Treatment Minutes: 90 Minutes  Minutes: 90          Date: 2024  Patient Name: Sadia Gomez,  Gender:  female        MRN: [id number]  : 1950  (74 y.o.)     Referring Practitioner: Cass Dennis DO  Diagnosis: Spinal stenosis of lumbar region with radiculopathy  Additional Pertinent Hx: Per EMR:Sadia Gomez is a 74 y.o. female with PMH significant for HTN, DM, and sleep apnea who was admitted to [facility] on 2024. She was admitted to acute IPR unit on 2024. Patient presented to [facility] on 2024 and underwent an elective T10-L2 decompression and fusion extension to T10, instrumentation to T11.  Patient reportedly tolerated procedure well.  Dr. Sullivan consulted for medical management.  Patient cleared for mobility with TLSO.  Therapy evaluated patient.  PM&R was consulted and ultimately patient determined to be a good candidate for an acute IPR stay in order to maximize her functional progress and independence prior to returning home.On acute care:Son reports confusion which he states started a couple days ago. He states they adjusted several of her pain medications in acute care hoping that would help. He states that initially after surgery she did not seem this confused but it seems to have worsened over the past couple of days. She states that she will give incorrect answer and she immediately knows that  it is not what she meant to say Patient attempted to get up several times on her own on arrival to rehab despite education. Due to confusion a telesitter was put in place.     Prior Level of Function:  Lives With: Spouse  Type of Home: House  Home Layout: One level  Home Access: 
 [facility]  INPATIENT PHYSICAL THERAPY  DAILY NOTE  [facility] - 8K-01/[id number]      Discharge Recommendations: Continue to assess pending progress  Equipment Recommendations: No (pt has RW and cane)               Time In: 1330  Time Out: 1500  Timed Code Treatment Minutes: 90 Minutes  Minutes: 90          Date: 2024  Patient Name: Sadia Gomez,  Gender:  female        MRN: [id number]  : 1950  (74 y.o.)     Referring Practitioner: Cass Dennis DO  Diagnosis: Spinal stenosis of lumbar region with radiculopathy  Additional Pertinent Hx: Per EMR:Sadia Gomez is a 74 y.o. female with PMH significant for HTN, DM, and sleep apnea who was admitted to [facility] on 2024. She was admitted to acute IPR unit on 2024. Patient presented to [facility] on 2024 and underwent an elective T10-L2 decompression and fusion extension to T10, instrumentation to T11.  Patient reportedly tolerated procedure well.  Dr. Sullivan consulted for medical management.  Patient cleared for mobility with TLSO.  Therapy evaluated patient.  PM&R was consulted and ultimately patient determined to be a good candidate for an acute IPR stay in order to maximize her functional progress and independence prior to returning home.On acute care:Son reports confusion which he states started a couple days ago. He states they adjusted several of her pain medications in acute care hoping that would help. He states that initially after surgery she did not seem this confused but it seems to have worsened over the past couple of days. She states that she will give incorrect answer and she immediately knows that  it is not what she meant to say Patient attempted to get up several times on her own on arrival to rehab despite education. Due to confusion a telesitter was put in place.     Prior Level of Function:  Lives With: Spouse  Type of Home: House  Home Layout: One level  Home Access: 
[facility]  Occupational Therapy  Daily Note    Discharge Recommendations: Continue to assess pending progress, Subacute/skilled nursing facility, and Patient would benefit from continued OT at discharge  Equipment Recommendations: Yes         Time In: 0853  Time Out: 0927  Timed Code Treatment Minutes: 34 Minutes  Minutes: 34          Date: 2024  Patient Name: Sadia Gomez,   Gender: female      Room: [address] [place] [address]  MRN: [id number]  : 1950  (74 y.o.)  Referring Practitioner: Cass Dennis DO  Diagnosis: Spinal stenosis of lumbar region with radiculopathy  Additional Pertinent Hx: Sadia Gomez is a 74 y.o. female with PMH significant for HTN, DM, and sleep apnea who was admitted to [facility] on 2024. She was admitted to acute IPR unit on 2024 History obtained via: ED documentation, acute care documentation, and patient.     Patient with history of an L2-S1 fusion in 2016.  Patient with history of constant low back pain with radiation of pain into bilateral hips and into the right lower extremity with numbness/tingling and weakness.  Per report, symptoms have been progressively worsening over time.  Patient symptoms aggravated by walking and alleviated with sitting.  Patient reportedly has failed medication management, PT, and injections.  Patient has followed with Dr. Dickinson as an outpatient and ultimately decision made to pursue surgical intervention.     Patient presented to [facility] on 2024 and underwent an elective T10-L2 decompression and fusion extension to T10, instrumentation to T11.  Patient reportedly tolerated procedure well.  Dr. Sullivan consulted for medical management.  Patient cleared for mobility with TLSO.  Therapy evaluated patient.  PM&R was consulted and ultimately patient determined to be a good candidate for an acute IPR stay in order to maximize her functional progress and independence prior to 
Admitted to the Inpatient Rehabilitation Unit via bed.  Patient was then oriented to room and unit.  Education provided on the rehabilitation routine: three hours of therapy five days per week.      Explained patients right to have family, representative or physician notified of their admission.  Patient has Declined for physician to be notified.  Patient has Declined for family/representative to be notified.    Admitting medication orders compared with acute stay medications; home medication list reviewed with patient/family.  Medication issues identified Yes  Medication issue: Patient unable to identify medications    If yes, physician notified Dr Dennis.    Vaccination Status  Have you ever received a COVID-19 vaccine? 3/6/21 J[employer]J      Transportation:   Has transportation kept you from medical appointments, meetings, work, or from getting things needed for daily living? (Check all that apply)  No.      Health Literacy:   How often do you need to have someone help you when you read instructions, pamphlets, or other written material from your doctor or pharmacy?  2. - Sometimes    Social Isolation:  How often do you feel lonely or isolated from those around you?  0. Never    Patient Mood Interview (PHQ-2 to 9) (from [employer]©)   Say to Patient: \"Over the last 2 weeks, have you been bothered by any of the following problems?\"   If symptom is present, enter yes in column 1 (Symptom Presence)  If yes in column 1, then ask the patient: “About how often have you been bothered by this?” Indicate response in column 2, Symptom Frequency.   Symptom Presence  No    Yes   9.    No response  Symptom Frequency  Never or 1 day  2-6 days (several days)  7-11 days (half or more of the days)  12-14 days (nearly every day)    Symptom Presence Symptom Frequency   Little interest or pleasure in doing things 0. No 0. Never or 1 day   Feeling down, depressed, or hopeless 0. No 0. Never or 1 day   If either A or B above has symptom 
Advance Care Planning     Advance Care Planning Inpatient Note  Spiritual Care Department    Today's Date: 11/19/2024  Unit: [facility]    Received request from IDT Member.  Upon review of chart and communication with care team, Spiritual Care will defer advance care planning with patient at this time.. Patient and Spouse was/were present in the room during visit.    Goals of ACP Conversation:  Discuss advance care planning documents    Health Care Decision Makers:       Primary Decision Maker: Izaiah Gomez H - Spouse - [phone]  Summary:  No Decision Maker named by patient at this time  Advance Care Planning Documents (Patient Wishes):  None     Assessment:  Sadia is a 74 year old female laying in her bed while her  Izaiah sat in the chair next to the bed. The purpose for this visit is in response to a spiritual consult to discuss POA and assist patient to complete documents if she is ready. At the time of this visit, patient said she is not ready because she is in too much pain from her surgery. This  explained document to patient including Code status: Full Code, DNR-CCA, DNR-CC. I also shared with patient what the medical staff will or will not do in each especially in the DNR-CCA and DNR-CC status. Patient is aware that spiritual care staff will follow up to complete spiritual consult. {  Interventions:  Encouraged ongoing ACP conversation with future decision makers and loved ones    Care Preferences Communicated:   No    Outcomes/Plan:  ACP Discussion: Postponed    Electronically signed by MIKEY Hernandez on 11/19/2024 at 10:51 AM           
[facility]  INPATIENT SPEECH THERAPY  [facility]  DAILY NOTE    Discharge Recommendations: SNF and Continue to Assess Pending Progress  SLP Individual Minutes  Time In: 1330  Time Out: 1400  Minutes: 30  Timed Code Treatment Minutes: 30 Minutes    Date: 2024  Patient Name: Sadia Gomez      CSN: [id number]   : 1950  (74 y.o.)  Gender: female   Referring Physician:  Cass Dennis DO  Diagnosis: Spinal stenosis of lumbar region with radiculopathy  Precautions: fall precautions  Current Diet: Regular diet and thin liquids  Respiratory Status: Room Air  Swallowing Strategies: Standard Universal Swallow Precautions  Date of Last MBS/FEES: Not Applicable    Pain:  No pain reported at this time.    Subjective:  Upon arrival, patient positioned upright in recliner with RN providing scheduled Tylenol at time of ST arrival. Patient alert and agreeable to skilled ST services. No family present.     Short-Term Goals:  SHORT TERM GOAL #1:  Goal 1: Patient will complete functional immediate/delayed recall tasks with 80% accuracy, min cues to improve retention of pertinent, novel information r/t daily living requirements.  INTERVENTIONS:  DNT due to focus on additional STGs.    SHORT TERM GOAL #2:  Goal 2: Patient will complete problem solving, verbal/visual reasoning, and executive functioning tasks (household obligations) with 80% accuracy, min cues to enhance success within ADLs/IADLs.  INTERVENTIONS:   [place] Menu Navigation: 5/10 independent, 1/10 with self correction, 4/10 with moderate cuing  *Reduced visual scanning and selective attention   *Reduced processing speed (>20 minutes to complete) and reduced working memory    SHORT TERM GOAL #3:  Goal 3: Patient will complete sequencing and thought organization tasks with 70% accuracy, mod cues to improve mental flexibility for home scenarios.   INTERVENTIONS:  DNT due to focus on additional STGs.    SHORT TERM GOAL 
[facility]  INPATIENT SPEECH THERAPY  [facility]  PROGRESS NOTE    Discharge Recommendations: Home Health    SLP Individual Minutes  Time In: 0832  Time Out: 0902  Minutes: 30  Timed Code Treatment Minutes: 30 Minutes       Date: 2024  Patient Name: Sadia Gomez      CSN: [id number]   : 1950  (74 y.o.)  Gender: female   Referring Physician:  Cass Dennis DO  Diagnosis: Spinal stenosis of lumbar region with radiculopathy  Precautions: fall precautions  Current Diet: Regular diet and thin liquids  Respiratory Status: Room Air  Swallowing Strategies: Standard Universal Swallow Precautions  Date of Last MBS/FEES: Not Applicable    Pain:  No pain reported. Patient reporting pain; however, unable to rate    Subjective:  Upon arrival, patient positioned upright in recliner. Patient alert and agreeable to skilled ST services. No family present.     Short-Term Goals:  SHORT TERM GOAL #1:  Goal 1: Patient will complete functional immediate/delayed recall tasks with 70% accuracy, mod cues to improve retention of pertinent, novel information r/t daily living requirements. GOAL MET  Revised Goal 1: Patient will complete functional immediate/delayed recall tasks with 80% accuracy, min cues to improve retention of pertinent, novel information r/t daily living requirements.  INTERVENTIONS: Immediate Recall of Novel ST Information (Job, [place], [place], 1of 5 children): 4/4 independently  *Written aid provided for assistance    SHORT TERM GOAL #2:  Goal 2: Patient will complete problem solving, verbal/visual reasoning, and executive functioning tasks (household obligations) with 70% accuracy, mod cues to enhance success within ADLs/IADLs. GOAL MET  Revised Goal 2: Patient will complete problem solving, verbal/visual reasoning, and executive functioning tasks (household obligations) with 80% accuracy, min cues to enhance success within ADLs/IADLs.  INTERVENTIONS: Medication 
[facility]  Recreational Therapy  Daily Note  [facility]    Time Spent with Patient: 25 minutes    Date:  11/27/2024       Patient Name: Sadia Gomez      MRN: [id number]      YOB: 1950 (74 y.o.)       Gender: female  Diagnosis: Spinal stenosis of lumbar regaion with radiculopathy  Referring Practitioner: Cass Dennis DO    RESTRICTIONS/PRECAUTIONS:  Restrictions/Precautions: General Precautions, Fall Risk, Surgical Protocols     Hearing: Within functional limits    PAIN: 0    SUBJECTIVE:  I needed this     OBJECTIVE:  Pt enjoyed getting her hair washed and styled by our beautician this am- she talked about her family, her gardening and how she use to sew and bake-pt very pleasant and appreciative          Patient Education  New Education Provided: Importance of Leisure,     Electronically signed by: Aida Campbell, CTRS  Date: 11/27/2024  
[facility]  INPATIENT SPEECH THERAPY  [facility]  DAILY NOTE    Discharge Recommendations: Home Health and Continue to Assess Pending Progress    SLP Individual Minutes  Time In: 1130  Time Out: 1200  Minutes: 30  Timed Code Treatment Minutes: 30 Minutes       Date: 2024  Patient Name: Sadia Gomez      CSN: [id number]   : 1950  (74 y.o.)  Gender: female   Referring Physician:  Cass Dennis DO  Diagnosis: Spinal stenosis of lumbar region with radiculopathy  Precautions: fall precautions  Current Diet: Regular diet and thin liquids  Respiratory Status: Room Air  Swallowing Strategies: Standard Universal Swallow Precautions  Date of Last MBS/FEES: Not Applicable    Pain:  7/10 - Pain location: back - RN aware    Subjective:  Patient laying in bed upon ST arrival. Agreeable to skilled ST services. Sister-in-law present. Live sitter present. Pleasant and cooperative throughout.     Short-Term Goals:  SHORT TERM GOAL #1:  Goal 1: Patient will complete functional immediate/delayed recall tasks with 70% accuracy, mod cues to improve retention of pertinent, novel information r/t daily living requirements.  INTERVENTIONS:  Orientation  Location: min cues to use environmental aids to ID name of [facility]  City: independent  Year:independent  Month: independent  Date: independent  JANET: min cue    Delayed Recall - ST Info  (Annie, 5 kids, likes to read)  24 hr delayed recall: 1/3 min cue; 1/3 mod cue; 1/3 unable to elicit    SHORT TERM GOAL #2:  Goal 2: Patient will complete problem solving, verbal/visual reasoning, and executive functioning tasks (household obligations) with 70% accuracy, mod cues to enhance success within ADLs/IADLs.  INTERVENTIONS:Did not address d/t focus on other goals      SHORT TERM GOAL #3:  Goal 3: Patient will complete sequencing and thought organization tasks with 70% accuracy, mod cues to improve mental flexibility for home 
[facility]  INPATIENT SPEECH THERAPY  [facility]  DAILY NOTE    Discharge Recommendations: Home Health    SLP Individual Minutes  Time In: 0930  Time Out: 1000  Minutes: 30  Timed Code Treatment Minutes: 30 Minutes       Date: 2024  Patient Name: Sadia Gomez      CSN: [id number]   : 1950  (74 y.o.)  Gender: female   Referring Physician:  Cass Dennis DO  Diagnosis: Spinal stenosis of lumbar region with radiculopathy  Precautions: fall precautions  Current Diet: Regular diet and thin liquids  Respiratory Status: Room Air  Swallowing Strategies: Standard Universal Swallow Precautions  Date of Last MBS/FEES: Not Applicable    Pain:  10/10 - Pain location: Back pain- RN aware, Re-positioned for comfort    Subjective:  Upon arrival, patient positioned upright in recliner, pleasant and cooperative. Live sitter present at onset of session, but left room as session began. No family present.     Short-Term Goals:  SHORT TERM GOAL #1:  Goal 1: Patient will complete functional immediate/delayed recall tasks with 70% accuracy, mod cues to improve retention of pertinent, novel information r/t daily living requirements.  INTERVENTIONS:  Functional Memory: Recall of grocery list from prior session (Eggs, Milk, Bread, Jacob crackers, Cereal, Meat, Cheese):  -~24 hour delay: 4/6 indep, 2/6 with mod cues to locate written aid for remaining pieces of information.    Recall of set up and rules of play for novel card game:  -~24 hour delay: 5/5 indep    SHORT TERM GOAL #2:  Goal 2: Patient will complete problem solving, verbal/visual reasoning, and executive functioning tasks (household obligations) with 70% accuracy, mod cues to enhance success within ADLs/IADLs.  INTERVENTIONS:   Evaluating pill box for errors: 1/7 indep, 1/7 with self correction, 2/7 with min cues, 2/7 with mod cues, 1/7 incorrect requiring total assist to correct  *Decreased Selective attention. Following 
[facility]  Diagnosis List for Inpatient Rehab facility (IRF) - Patient Assessment Instrument (KWAME)    Patient Name: Sadia Gomez        MRN: [id number]    : 1950  (74 y.o.)  Gender: female     Primary impairment requiring rehabilitation: 3.9 Other neurological      Etiologic Diagnosis that led to the condition: Lumbar stenosis with radiculopathy    Comorbid conditions affecting rehabilitation:  Lumbar stenosis with radiculopathy  Type 2 diabetes with hyperglycemia  Hypertension  HLD  Tachycardia  Postop anemia  Impaired mobility and ADLs    Electronically signed by Cass Dennis DO on 2024 at 2:03 PM    
[facility]  Individualized Disclosure Statement      Patient: Sadia Gomez      Scope of Service  [facility] provides 24 hour individualized service to patients with functional limitations due to, but not limited to: stroke, brain injury, spinal cord injury, major multiple trauma, fractures, amputation, and neurological disorders. The Rehabilitation Center provides rehabilitative nursing and medical services as well as physical, occupational, speech, and recreation therapies.  [facility] is fully accredited by the Commission on Accreditation of Rehabilitation Facilities (CARF) as a comprehensive provider of rehabilitation services.  Patients admitted to the [facility] receive a minimum of three hours of therapy per day, at least five days per week, with a revised therapy schedule on weekends and holidays.  Physical therapy, occupational therapy, and speech therapy are provided seven days per week including holidays.  Other therapeutic services are available on weekends and evenings as needed or scheduled.  Intensity of Treatment  Your treatment program will consist of Nursing Care and:  1.5 hours of Physical Therapy, per day  1.5 hours of Occupational Therapy, per day   30-60 minutes of Speech Therapy, per day  1 hour of Recreational Therapy, per week  Depending on your needs, the exact time spent with each of the above disciplines may fluctuate, however you will receive at least 3 hours of therapy at least 5 days per week.    [facility] maintains contracts with most insurance plans.  Depending on the type of coverage, the insurance may impose limits on the coverage for rehabilitation care.  Coverage is based on the premise that you are able to fully participate in the rehabilitation program and show continued progress. Please verify your own insurance information. A 
[facility]  SPEECH THERAPY  [facility]  Speech - Language - Cognitive Evaluation + Cognitive tx    Discharge Recommendations: Continue to Assess Pending Progress, Anticipating some level of supervision at discharge.     SLP Individual Minutes  Time In: 1100  Time Out: 1130  Minutes: 30  Timed Code Treatment Minutes: 9 Minutes       Date: 2024  Patient Name: Sadia Gomez      CSN: [id number]   : 1950  (74 y.o.)  Gender: female   Referring Physician:  Dr. Cass Dennis DO  Diagnosis: Spinal stenosis of lumbar region with radiculopathy  Precautions: Fall risk, Virtual  in room   History of Present Illness/Injury: Patient admitted with the above diagnosis. Per physician H&P: \"Sadia Gomez is a 74 y.o. female with PMH significant for HTN, DM, and sleep apnea who was admitted to [facility] on 2024. She was admitted to acute IPR unit on 2024 History obtained via: ED documentation, acute care documentation, and patient.     Patient with history of an L2-S1 fusion in 2016.  Patient with history of constant low back pain with radiation of pain into bilateral hips and into the right lower extremity with numbness/tingling and weakness.  Per report, symptoms have been progressively worsening over time.  Patient symptoms aggravated by walking and alleviated with sitting.  Patient reportedly has failed medication management, PT, and injections.  Patient has followed with Dr. Dickinson as an outpatient and ultimately decision made to pursue surgical intervention.     Patient presented to [facility] on 2024 and underwent an elective T10-L2 decompression and fusion extension to T10, instrumentation to T11.  Patient reportedly tolerated procedure well.  Dr. Sullivan consulted for medical management.  Patient cleared for mobility with TLSO.  Therapy evaluated patient.  PM&R was consulted and ultimately patient determined to be a good candidate for 
CLINICAL PHARMACY: DISCHARGE MED RECONCILIATION/REVIEW    [facility] Select Patient?: No  Total # of Interventions Recommended: 0  Total # Interventions Accepted: 0  Intervention Severity:   - Level 1 Intervention Present?: No   - Level 2 #: 0   - Level 3 #: 0   Time Spent (min): 15    Olufeyisayo Omitowoju ([place]) , PharmD 12/2/2024 10:26 AM    
[facility]  Occupational Therapy  Daily Note    Discharge Recommendations: Continue to assess pending progress  Equipment Recommendations: Yes         Time In: 0700  Time Out: 0830  Timed Code Treatment Minutes: 90 Minutes  Minutes: 90          Date: 2024  Patient Name: Sadia Gomez,   Gender: female      Room: [facility]/001-A  MRN: [id number]  : 1950  (74 y.o.)  Referring Practitioner: Cass Dennis DO  Diagnosis: Spinal stenosis of lumbar region with radiculopathy  Additional Pertinent Hx: Sadia Gomez is a 74 y.o. female with PMH significant for HTN, DM, and sleep apnea who was admitted to [facility] on 2024. She was admitted to acute IPR unit on 2024 History obtained via: ED documentation, acute care documentation, and patient.     Patient with history of an L2-S1 fusion in 2016.  Patient with history of constant low back pain with radiation of pain into bilateral hips and into the right lower extremity with numbness/tingling and weakness.  Per report, symptoms have been progressively worsening over time.  Patient symptoms aggravated by walking and alleviated with sitting.  Patient reportedly has failed medication management, PT, and injections.  Patient has followed with Dr. Dickinson as an outpatient and ultimately decision made to pursue surgical intervention.     Patient presented to [facility] on 2024 and underwent an elective T10-L2 decompression and fusion extension to T10, instrumentation to T11.  Patient reportedly tolerated procedure well.  Dr. Sullivan consulted for medical management.  Patient cleared for mobility with TLSO.  Therapy evaluated patient.  PM&R was consulted and ultimately patient determined to be a good candidate for an acute IPR stay in order to maximize her functional progress and independence prior to returning home.      On day of admission, patient is seen with son at bedside. Son reports 
Comprehensive Nutrition Assessment    Type and Reason for Visit:  Initial, Consult (ONS)    Nutrition Recommendations/Plan:   Consider 1800 kcal carb controlled diet to aid with blood sugar control for wound healing as appropriate.  Consider MVI.  Send Glucerna BID.   Continue activia & hot beverage TID.     Malnutrition Assessment:  Malnutrition Status:  Moderate malnutrition (11/20/24 1001)    Context:  Acute Illness     Findings of the 6 clinical characteristics of malnutrition:  Energy Intake:  75% or less of estimated energy requirements for 7 or more days  Weight Loss:   (17.2% wt loss in 8 1/2 years per EMR)     Body Fat Loss:  Moderate body fat loss Orbital   Muscle Mass Loss:  Moderate muscle mass loss Temples (temporalis)  Fluid Accumulation:  No fluid accumulation     Strength:  Not Performed    Nutrition Assessment:     Pt. moderately malnourished AEB criteria as listed above.  At risk for further nutrition compromise r/t admit with T10-L2 degenerative disc disease with T10-L2 lumbar stenosis with neurogenic claudication, increased needs for wound healing s/p bilateral laminectomy & T10-S1, posterior spinal fusion, post op anemia  , AMS, Hallucinations and underlying medical condition (DM, HTN, L2-S1 fusion in 2016, HTN, HLD).        Nutrition Related Findings:    Pt. Report/Treatments/Miscellaneous: Pt seen ~0830 , has a telesitter & a live sitter, appears confused/lethargic. Sitter mentions pt has not received her breakfast tray yet. Pt told me she ate some eggs. Intake appears 26-75% overall . Unable to obtain a diet hx. Pt amenable to trying an ONS.   GI Status: BM x 1 (11/20)  Pertinent Labs: POC Glucose 136-181, (10/23/24) Hemoglobin A1C 6.7, (11/19/24) Hemoglobin 9.8  Pertinent Meds: Dulcolax, Glucotrol, Glucophage     Wound Type: Surgical Incision ((11/12/24) incision back medial)       Current Nutrition Intake & Therapies:    Average Meal Intake: 26-50%, 51-75%  Average Supplements Intake: 
Comprehensive Nutrition Assessment    Type and Reason for Visit:  Reassess    Nutrition Recommendations/Plan:   Recommend a Multivitamin daily.  Continue current diet.  Changed ONS to Magic Cups TID. Pt dislikes Glucerna and does not want any other Milky ONS's.  Continue Activia yogurt and Hot Beverage TID.     Malnutrition Assessment:  Malnutrition Status:  Moderate malnutrition (11/20/24 1001)    Context:  Acute Illness     Findings of the 6 clinical characteristics of malnutrition:  Energy Intake:  75% or less of estimated energy requirements for 7 or more days  Weight Loss:   (17.2% wt loss in 8 1/2 years per EMR)     Body Fat Loss:  Moderate body fat loss Orbital   Muscle Mass Loss:  Moderate muscle mass loss Temples (temporalis)  Fluid Accumulation:  No fluid accumulation     Strength:  Not Performed    Nutrition Assessment: Pt. with no improvement from a nutritional standpoint AEB pt reports eating 50% or less of meals d/t poor appetite over the past week. Remains at risk for further nutritional compromise r/t admit with T10-L2 degenerative disc disease with T10-L2 lumbar stenosis with neurogenic claudication, increased needs for wound healing s/p bilateral laminectomy & T10-S1, posterior spinal fusion, post op anemia  , AMS, Hallucinations and underlying medical condition (DM, HTN, L2-S1 fusion in 2016, HTN, HLD).          Nutrition Related Findings:    Pt. Report/Treatments/Miscellaneous: Pt seen this morning. +telesitter. Pt reports consuming bites of meals to 50% of most meals 2/2 poor appetite. Pt states she dislikes the Glucerna and is not drinking it with meals. Pt states is not a milk drinker and does not want milk ONS's. Pt states she would rather have ice cream with meals. Pt amenable to Magic Cup TID. Pt denies any nausea or abdominal pain.  GI Status: BM x 2 on 11/24.  Pertinent Labs: POC Glucose 131-183, (10/23/24) Hemoglobin A1C 6.7, (11/19/24)  Pertinent Meds: Jardiance, Glucotrol, 
[facility]  INPATIENT SPEECH THERAPY  [facility]  DAILY NOTE    Discharge Recommendations: Home Health    SLP Individual Minutes  Time In: 0830  Time Out: 0900  Minutes: 30  Timed Code Treatment Minutes: 30 Minutes       Date: 2024  Patient Name: Sadia Gomez      CSN: [id number]   : 1950  (74 y.o.)  Gender: female   Referring Physician:  Cass Dennis DO  Diagnosis: Spinal stenosis of lumbar region with radiculopathy  Precautions: fall precautions  Current Diet: Regular diet and thin liquids  Respiratory Status: Room Air  Swallowing Strategies: Standard Universal Swallow Precautions  Date of Last MBS/FEES: Not Applicable    Pain:  10/10 - Pain location: Back pain- RN aware, reporting she is not due for pain medication for 1 hour. Provided re-positioning for comfort.    Subjective:  Patient positioned upright in recliner upon SLP arrival, finishing morning meal. Daughter present throughout session.     Short-Term Goals:  SHORT TERM GOAL #1:  Goal 1: Patient will complete functional immediate/delayed recall tasks with 70% accuracy, mod cues to improve retention of pertinent, novel information r/t daily living requirements.  INTERVENTIONS:  Functional Memory: Per discussion, patient reporting that she completes the household grocery shopping and typically creates a grocery list throughout the week for items needed. Prompted patient to generate a list of grocery items that she would typically select from the store (Eggs, Milk, Bread, Jacob crackers, Cereal, Meat, Cheese).  *Patient independently writing list out (some spelling errors, however daughter reporting reduced spelling at baseline).  -Immediate recall:  indep  -Recall following a 5 minute delay: / indep, / with min cues to review written aid  -Recall following a 20 minute delay: 6/ indep, /7 with min cues to review written aid    SHORT TERM GOAL #2:  Goal 2: Patient will complete problem 
Discharge pain assessment:    Complete within 3 days of discharge.      Pain Effect on Sleep ()   Ask patient: \"Over the past 5 days, how much of the time has pain made it hard for you to sleep at night?\" 2.  Occasionally     If no pain is reported, end interview.  If pain is reported, continue with the additional questions.   Pain Interference with Therapy Activities   Ask patient: \"Over the past 5 days, how often have you limited your participation in rehabilitation therapy sessions due to pain?\" 2.  Occasionally   Pain Interference with Day to Day Activities   Ask patient: \"Over the past 5 days, how often have you limited your day to day activities (excluding rehabilitation therapy sessions) because of pain?\" 2.  Occasionally      
[facility]  Recreational Therapy  Daily Note  [facility]    Time Spent with Patient: 15 minutes    Date:  11/27/2024       Patient Name: Sadia Gomez      MRN: [id number]      YOB: 1950 (74 y.o.)       Gender: female  Diagnosis: Spinal stenosis of lumbar regaion with radiculopathy  Referring Practitioner: Cass Dennis DO    RESTRICTIONS/PRECAUTIONS:  Restrictions/Precautions: General Precautions, Fall Risk, Surgical Protocols     Hearing: Within functional limits    PAIN: 0    SUBJECTIVE:  I saw my  this morning but I am not getting any answers from anyone as to why he is here    OBJECTIVE:  Pt was able to visit her  this am but she is still annoyed that she is not getting any answers as to what is going on with him-pt also worries about her daughter who has so much on her plate right now with both parents in the hospital and her  has previously had heart surgery-she feels she is putting too much stress on her kids right now-she did agree to get her hair styled by our beautician today stating she needs something done with her hair-affect flat-worried-supportive contact given         Patient Education  New Education Provided: Importance of Leisure,     Electronically signed by: Aida Campbell, CTRS  Date: 11/27/2024  
[facility]  INPATIENT PHYSICAL THERAPY  EVALUATION  [facility] - 8K-27/027-A    Discharge Recommendations: 24 hour supervision or assist, Home with Home health PT, Continue to assess pending progress  Equipment Recommendations: No (pt has RW and cane)             Time In: 0900  Time Out: 1030  Timed Code Treatment Minutes: 75 Minutes  Minutes: 90          Date: 2024  Patient Name: Sadia Gomez,  Gender:  female        MRN: [id number]  : 1950  (74 y.o.)      Referring Practitioner: Cass Dennis DO  Diagnosis: Spinal stenosis of lumbar region with radiculopathy  Additional Pertinent Hx: Per EMR:Sadia Gomez is a 74 y.o. female with PMH significant for HTN, DM, and sleep apnea who was admitted to [facility] on 2024. She was admitted to acute IPR unit on 2024. Patient presented to [facility] on 2024 and underwent an elective T10-L2 decompression and fusion extension to T10, instrumentation to T11.  Patient reportedly tolerated procedure well.  Dr. Sullivan consulted for medical management.  Patient cleared for mobility with TLSO.  Therapy evaluated patient.  PM&R was consulted and ultimately patient determined to be a good candidate for an acute IPR stay in order to maximize her functional progress and independence prior to returning home.On acute care:Son reports confusion which he states started a couple days ago. He states they adjusted several of her pain medications in acute care hoping that would help. He states that initially after surgery she did not seem this confused but it seems to have worsened over the past couple of days. She states that she will give incorrect answer and she immediately knows that  it is not what she meant to say Patient attempted to get up several times on her own on arrival to rehab despite education. Due to confusion a telesitter was put in place.     Restrictions/Precautions:  Restrictions/Precautions: 
[facility]  INPATIENT SPEECH THERAPY  [facility]  DAILY NOTE    Discharge Recommendations: Home Health    SLP Individual Minutes  Time In: 1130  Time Out: 1200  Minutes: 30  Timed Code Treatment Minutes: 30 Minutes       Date: 2024  Patient Name: Sadia Gomez      CSN: [id number]   : 1950  (74 y.o.)  Gender: female   Referring Physician:  Cass Dennis DO  Diagnosis: Spinal stenosis of lumbar region with radiculopathy  Precautions: fall precautions  Current Diet: Regular diet and thin liquids  Respiratory Status: Room Air  Swallowing Strategies: Standard Universal Swallow Precautions  Date of Last MBS/FEES: Not Applicable    Pain:  7/10 - Pain location: R side low back; RN aware    Subjective:  Upon arrival, patient positioned in wheelchair. Daughter and live sitter present in room for duration of session. Participatory throughout.     Short-Term Goals:  SHORT TERM GOAL #1:  Goal 1: Patient will complete functional immediate/delayed recall tasks with 70% accuracy, mod cues to improve retention of pertinent, novel information r/t daily living requirements.  INTERVENTIONS:  Generation + Recall of Novel Grocery List (candy, bread, eggs, milk, cereal, toilet paper, soap):  *Independent generation of 5 items; ST added 2 items   Immediate Recall:  while creating written aid     Recall of card game \"Trash\":  ~24 hour delayed recall (since last recall trial): independent for several set-up, rules, and overall game play    Recall Spinal Precautions: 2/3 independent, 1/3 total assist despite providing \"BLT acronym\"      SHORT TERM GOAL #2:  Goal 2: Patient will complete problem solving, verbal/visual reasoning, and executive functioning tasks (household obligations) with 70% accuracy, mod cues to enhance success within ADLs/IADLs.  INTERVENTIONS: Not directly addressed due to focus on other goals.    SHORT TERM GOAL #3:  Goal 3: Patient will complete sequencing and 
[facility]  Occupational Therapy  Daily Note    Discharge Recommendations: Continue to assess pending progress and Patient would benefit from continued OT at discharge  Equipment Recommendations: Yes        Time In: 1030  Time Out: 1200  Timed Code Treatment Minutes: 90 Minutes  Minutes: 90          Date: 2024  Patient Name: Sadia Gomez,   Gender: female      Room: [address] [place] [address]  MRN: [id number]  : 1950  (74 y.o.)  Referring Practitioner: Cass Dennis DO  Diagnosis: Spinal stenosis of lumbar region with radiculopathy  Additional Pertinent Hx: Sadia Gomez is a 74 y.o. female with PMH significant for HTN, DM, and sleep apnea who was admitted to [facility] on 2024. She was admitted to acute IPR unit on 2024 History obtained via: ED documentation, acute care documentation, and patient.     Patient with history of an L2-S1 fusion in 2016.  Patient with history of constant low back pain with radiation of pain into bilateral hips and into the right lower extremity with numbness/tingling and weakness.  Per report, symptoms have been progressively worsening over time.  Patient symptoms aggravated by walking and alleviated with sitting.  Patient reportedly has failed medication management, PT, and injections.  Patient has followed with Dr. Dickinson as an outpatient and ultimately decision made to pursue surgical intervention.     Patient presented to [facility] on 2024 and underwent an elective T10-L2 decompression and fusion extension to T10, instrumentation to T11.  Patient reportedly tolerated procedure well.  Dr. Sullivan consulted for medical management.  Patient cleared for mobility with TLSO.  Therapy evaluated patient.  PM&R was consulted and ultimately patient determined to be a good candidate for an acute IPR stay in order to maximize her functional progress and independence prior to returning home.      On day of 
Patient discharged in stable condition as per order of attending physician to Skilled Facility per staff at Time: 1445 to public transportation.    AVS provided by RN at time of discharge, which includes all necessary medical information pertaining to the patients current course of illness, treatment, medications, post-discharge goals of care, and treatment preferences.     Patient/ family verbalize understanding of discharge plan and are in agreement with goal/plan/treatment preferences.     Belongings including None sent with patient.      Home medications sent home with patient no    Availability of \"My Chart\" offered to patient as a tool for updated health record.  Steps for activation discussed with patient as mentioned on AVS.     
Patient educated on how to use incentive spirometer. Patient verbalized understanding and demonstrated proper use. Emphasized importance and usage of device, with coughing and deep breathing every 4 hours while awake.   
Patient has worked therapy today. She is concerned for her  who is also a patient here at this facility. Pain addressed as requested. NO complaints of chest pain or SOB.   
Patient received last rites/anointing by a . If you are in need of  support, please call [phone]. If you are in need of a  after 6:30pm, please call the house supervisor for the on-call .      Falguni Covington  Spiritual Health Coordinator  [phone]   
Patient: Sadia Gomez  Unit/Bed: 8K-01/001-A  YOB: 1950  MRN: [id number] Acct: [id number]   Admitting Diagnosis: Spinal stenosis of lumbar region with radiculopathy [M48.061, M54.16]  Admit Date:  11/18/2024  Hospital Day: 3    Assessment:     Principal Problem:    Spinal stenosis of lumbar region with radiculopathy  Active Problems:    Moderate malnutrition (HCC)  Resolved Problems:    * No resolved hospital problems. *      Plan:     I discussed with her and her family members that are present how she could resume the jardiance from home if someone could  bring it in.        Subjective:     Patient has no complaint of CP or SOB at rest  Medication side effects: none    Scheduled Meds:   empagliflozin  10 mg Oral Daily    metaxalone  800 mg Oral TID    acetaminophen  1,000 mg Oral 3 times per day    enoxaparin  40 mg SubCUTAneous Daily    amLODIPine  10 mg Oral Daily    glipiZIDE  10 mg Oral QAM AC    losartan  50 mg Oral Daily    metFORMIN  1,000 mg Oral BID    pravastatin  20 mg Oral Daily    alogliptin  12.5 mg Oral Daily    triamterene-hydroCHLOROthiazide  1 tablet Oral Daily    polyethylene glycol  17 g Oral Daily    miconazole   Topical BID    bisacodyl  10 mg Oral BID    sennosides-docusate sodium  2 tablet Oral BID    naloxegol  12.5 mg Oral QAM AC     Continuous Infusions:   sodium chloride      dextrose       PRN Meds:aluminum & magnesium hydroxide-simethicone, traMADol **OR** traMADol, QUEtiapine, sodium chloride flush, sodium chloride, magnesium hydroxide, bisacodyl, glucose, dextrose bolus **OR** dextrose bolus, glucagon (rDNA), dextrose, potassium chloride **OR** potassium alternative oral replacement, ondansetron    Review of Systems  Pertinent items are noted in HPI.    Objective:     Patient Vitals for the past 8 hrs:   Resp   11/21/24 0424 14     I/O last 3 completed shifts:  In: 1170 [P.O.:1170]  Out: 200 [Urine:200]  No intake/output data recorded.    BP (!) 129/57   Pulse 87  
Patient: Sadia Gomez  Unit/Bed: 8K-01/001-A  YOB: 1950  MRN: [id number] Acct: [id number]   Admitting Diagnosis: Spinal stenosis of lumbar region with radiculopathy [M48.061, M54.16]  Admit Date:  11/18/2024  Hospital Day: 8    Assessment:     Principal Problem:    Spinal stenosis of lumbar region with radiculopathy  Active Problems:    Moderate malnutrition (HCC)  Resolved Problems:    * No resolved hospital problems. *      Plan:     Follow today's elevated CS        Subjective:     Patient has no complaint of CP or SOB..   Medication side effects: none    Scheduled Meds:   sennosides-docusate sodium  1 tablet Oral BID    empagliflozin  10 mg Oral Daily    metaxalone  800 mg Oral TID    acetaminophen  1,000 mg Oral 3 times per day    enoxaparin  40 mg SubCUTAneous Daily    amLODIPine  10 mg Oral Daily    glipiZIDE  10 mg Oral QAM AC    losartan  50 mg Oral Daily    metFORMIN  1,000 mg Oral BID    pravastatin  20 mg Oral Daily    alogliptin  12.5 mg Oral Daily    triamterene-hydroCHLOROthiazide  1 tablet Oral Daily    polyethylene glycol  17 g Oral Daily    miconazole   Topical BID    naloxegol  12.5 mg Oral QAM AC     Continuous Infusions:   sodium chloride      dextrose       PRN Meds:traMADol **OR** traMADol, aluminum & magnesium hydroxide-simethicone, QUEtiapine, sodium chloride flush, sodium chloride, magnesium hydroxide, bisacodyl, glucose, dextrose bolus **OR** dextrose bolus, glucagon (rDNA), dextrose, potassium chloride **OR** potassium alternative oral replacement, ondansetron    Review of Systems  Pertinent items are noted in HPI.    Objective:     Patient Vitals for the past 8 hrs:   Resp   11/26/24 1813 19     I/O last 3 completed shifts:  In: 1500 [P.O.:1500]  Out: -   No intake/output data recorded.    BP (!) 123/51   Pulse 79   Temp 97.9 °F (36.6 °C) (Oral)   Resp 19   Ht 1.6 m (5' 3\")   Wt 74.7 kg (164 lb 10.9 oz)   SpO2 98%   BMI 29.17 kg/m²     General appearance: alert, 
Patient: Sadia Gomez  Unit/Bed: 8K-01/001-A  YOB: 1950  MRN: [id number] Acct: [id number]   Admitting Diagnosis: Spinal stenosis of lumbar region with radiculopathy [M48.061, M54.16]  Admit Date:  11/18/2024  Hospital Day: 10    Assessment:     Principal Problem:    Spinal stenosis of lumbar region with radiculopathy  Active Problems:    Moderate malnutrition (HCC)  Resolved Problems:    * No resolved hospital problems. *      Plan:     The CS are acceptable.        Subjective:     Patient has no complaint of CP or SOB..   Medication side effects: none    Scheduled Meds:   sennosides-docusate sodium  1 tablet Oral BID    empagliflozin  10 mg Oral Daily    metaxalone  800 mg Oral TID    acetaminophen  1,000 mg Oral 3 times per day    enoxaparin  40 mg SubCUTAneous Daily    amLODIPine  10 mg Oral Daily    glipiZIDE  10 mg Oral QAM AC    losartan  50 mg Oral Daily    metFORMIN  1,000 mg Oral BID    pravastatin  20 mg Oral Daily    alogliptin  12.5 mg Oral Daily    triamterene-hydroCHLOROthiazide  1 tablet Oral Daily    polyethylene glycol  17 g Oral Daily    miconazole   Topical BID    naloxegol  12.5 mg Oral QAM AC     Continuous Infusions:   sodium chloride      dextrose       PRN Meds:docusate sodium, traMADol **OR** traMADol, aluminum & magnesium hydroxide-simethicone, QUEtiapine, sodium chloride flush, sodium chloride, magnesium hydroxide, bisacodyl, glucose, dextrose bolus **OR** dextrose bolus, glucagon (rDNA), dextrose, potassium chloride **OR** potassium alternative oral replacement, ondansetron    Review of Systems  Pertinent items are noted in HPI.    Objective:     Patient Vitals for the past 8 hrs:   BP Temp Temp src Pulse Resp SpO2   11/28/24 1217 -- -- -- -- 16 --   11/28/24 0830 133/61 98.2 °F (36.8 °C) Oral 82 16 100 %     I/O last 3 completed shifts:  In: 236 [P.O.:236]  Out: -   I/O this shift:  In: 320 [P.O.:320]  Out: -     /61   Pulse 82   Temp 98.2 °F (36.8 °C) (Oral)   
Patient: Sadia Gomez  Unit/Bed: 8K-01/001-A  YOB: 1950  MRN: [id number] Acct: [id number]   Admitting Diagnosis: Spinal stenosis of lumbar region with radiculopathy [M48.061, M54.16]  Admit Date:  11/18/2024  Hospital Day: 4    Assessment:     Principal Problem:    Spinal stenosis of lumbar region with radiculopathy  Active Problems:    Moderate malnutrition (HCC)  Resolved Problems:    * No resolved hospital problems. *      Plan:     I discussed with Dr Dennis how she has mentioned some intermittent abd pains across different lower abd locations. We reviewed the CT of 11/19/24 and will follow her clinically since she did have a celestin at one time.  She is now on the jardiance from home for her DM and will  follow the CS        Subjective:     Patient has complaints of the lower abd soreness as above..   Medication side effects: none    Scheduled Meds:   empagliflozin  10 mg Oral Daily    metaxalone  800 mg Oral TID    acetaminophen  1,000 mg Oral 3 times per day    enoxaparin  40 mg SubCUTAneous Daily    amLODIPine  10 mg Oral Daily    glipiZIDE  10 mg Oral QAM AC    losartan  50 mg Oral Daily    metFORMIN  1,000 mg Oral BID    pravastatin  20 mg Oral Daily    alogliptin  12.5 mg Oral Daily    triamterene-hydroCHLOROthiazide  1 tablet Oral Daily    polyethylene glycol  17 g Oral Daily    miconazole   Topical BID    sennosides-docusate sodium  2 tablet Oral BID    naloxegol  12.5 mg Oral QAM AC     Continuous Infusions:   sodium chloride      dextrose       PRN Meds:aluminum & magnesium hydroxide-simethicone, traMADol **OR** traMADol, QUEtiapine, sodium chloride flush, sodium chloride, magnesium hydroxide, bisacodyl, glucose, dextrose bolus **OR** dextrose bolus, glucagon (rDNA), dextrose, potassium chloride **OR** potassium alternative oral replacement, ondansetron    Review of Systems  Pertinent items are noted in HPI.    Objective:     Patient Vitals for the past 8 hrs:   Resp   11/22/24 1828 20 
Physical Therapy   [facility]  INPATIENT PHYSICAL THERAPY  DAILY NOTE  [facility] - 8K-01/001-A      Discharge Recommendations: Continue to assess pending progress, Subacte/Skilled Nursing Facility, and Patient would benefit from continued PT at discharge  Equipment Recommendations: No (pt has RW and cane)               Time In: 07  Time Out: 0855  Timed Code Treatment Minutes: 90 Minutes  Minutes: 90          Date: 2024  Patient Name: Sadia Gomez,  Gender:  female        MRN: [id number]  : 1950  (74 y.o.)     Referring Practitioner: Cass Dennis DO  Diagnosis: Spinal stenosis of lumbar region with radiculopathy  Additional Pertinent Hx: Per EMR:Sadia Gomez is a 74 y.o. female with PMH significant for HTN, DM, and sleep apnea who was admitted to [facility] on 2024. She was admitted to acute IPR unit on 2024. [facility] and underwent an elective T10-L2 decompression and fusion extension to T10, instrumentation to T11.  Patient reportedly tolerated procedure well.  Dr. Sullivan consulted for medical management.  Patient cleared for mobility with TLSO.  Therapy evaluated patient.  PM&R was consulted and ultimately patient determined to be a good candidate for an acute IPR stay in order to maximize her functional progress and independence prior to returning home.On acute care:Son reports confusion which he states started a couple days ago. He states they adjusted several of her pain medications in acute care hoping that would help. He states that initially after surgery she did not seem this confused but it seems to have worsened over the past couple of days. She states that she will give incorrect answer and she immediately knows that  it is not what she meant to say Patient attempted to get up several times on her own on arrival to rehab despite education. Due to confusion a telesitter was put in place. 
Prtcomfortable in bed,used incentive spirometer per order,feet elevated ,bilateral pneumatic pumps on ,dressing changed, and applied skin dry and intact,not in much pain at the moment.Will monitor.  
Pt aware of Incentive spirometer Q 4 HRS and used spirometer at this time.  
Pt had no behaviors this evening,still continues a live sitter and reported to RN on duty no behaviors ,seemed alert and talkative this evening.Will monitor  
Pt has worked well with therapy today.  She has complained of pain and the increase in pain medications have seemed to work well.  She has walked a few times with staff out side of therapy.  She has not set the tele sitter off at this time.   
Pt reminded to use incentive spirometer every 4 hrs while awake and used the spirometer before bed.She was adjusted in bed with ice on her low right back area and hip ,pneumatice compression devices applied on lower legs while in bed this evening.Dressing changed with no drainage and intact.  
Pt worked well with therapy today.  Did complain of pain 35/10 pain medications given per order with some effect.  She is not able to walk into the restroom and uses the bedside commode. She has had a sitter all day and has been calm and has not had any hallucinations.  
Pt worked well with therapy today.  She has been walking to the bathroom.  She did ask to walk from her chair to the nurses station and back which she was able to do with assistance. She has complained of pain through out the day and given both scheduled and PRN medications and used her heating pad with some effect.  She has been A&Ox 4, no hallucinations or behaviors.  She did go for a visit with her  on another floor with her sitter.   
RECREATIONAL THERAPY  [facility]      Date:  11/20/2024            Patient Name: Sadia Gomez           MRN: [id number]  Acct: [id number]          YOB: 1950 (74 y.o.)       Gender: female   Diagnosis: Spinal stenosis of lumbar regaion with radiculopathy  Physician: Referring Practitioner: Cass Dennis DO    REASON FOR MISSED TREATMENT:  Will evaluate for RT on Thursday     CHIP MorinS    11/20/2024      
RECREATIONAL THERAPY  [facility]      Date:  11/26/2024            Patient Name: Sadia Gomez           MRN: [id number]  Acct: [id number]          YOB: 1950 (74 y.o.)       Gender: female   Diagnosis: Spinal stenosis of lumbar regaion with radiculopathy  Physician: Referring Practitioner: Cass Dennis DO    REASON FOR MISSED TREATMENT:  Pt fatigued this am-is not interested in hair care tomorrow but will ask her in the morning to see if she is feeling better     Aida Campbell, CTRS    11/26/2024      
[facility]  INPATIENT REHABILITATION  TEAM CONFERENCE NOTE    Conference Date: 2024  Admit Date:  2024  2:16 PM  Patient Name: Sadia Gomez    MRN: [id number]    : 1950  (74 y.o.)  Rehabilitation Admitting Diagnosis:  Spinal stenosis of lumbar region with radiculopathy [M48.061, M54.16]  Referring Practitioner: Cass Dennis DO      CASE MANAGEMENT  Current issues/needs regarding patient and family discharge status: Patient continues to be motivated and progressing with therapy. Patient plans to be discharged on Saturday, . Services have not been determined at this time. SW will follow and maintain involvement in discharge planning.     PHYSICAL THERAPY  Mrs Gomez met 2/6 STG's and 0/5 LTG's.  Patient is demonstrating gradual progress towards the goals set for her since initial evaluation, progressing sit  <> stand from min assist to CGA, and gait from min assist to CGA with a RW with distance up to 32'.  Patient completed the TUG which she completed in 76.22 seconds indicating increased risk for falls.  Pt limited significantly by low back pain.  Pt also impacted by weakness in her hips affecting her gait.  Pt has 2 steps to enter her home with no hand rails, and is not safe at this time to attempt steps due to pain and weakness in her LE's.  Sadia will benefit from continued skilled PT services to continue to improve her ability to complete functional mobility, reduce her risk for falls and allow patient to return to home safely.  Equipment Needed: No (pt has RW and cane)    SPEECH THERAPY  Patient met 3/4 short term goals and 0/1 long term goals. Patient presents with moderate-severe cognitive impairment as derived by a score of 9/30 on the MOCA cognitive screener. Impairments evident with executive functioning, delayed recall, attention, math computation and thought organization. Improvements noted within immediate recall and BASIC problem solving. Expressive and 
[facility]  INPATIENT REHABILITATION  TEAM CONFERENCE NOTE    Conference Date: 2024  Admit Date:  2024  2:16 PM  Patient Name: Sadia Gomez    MRN: [id number]    : 1950  (74 y.o.)  Rehabilitation Admitting Diagnosis:  Spinal stenosis of lumbar region with radiculopathy [M48.061, M54.16]  Referring Practitioner: Cass Dennis DO      CASE MANAGEMENT  Current issues/needs regarding patient and family discharge status: Prior to admission, patient was living with her , Izaiah. Patient reported being independent at home. Patient was completing her ADLs, meal prep, some housekeeping, errands, finances and driving. Izaiah is able to complete some light housekeeping, but is unable to provide physical assistance at home. Patient's support includes Izaiah and their son, Serafin, who lives locally. Patient also has a son and daughter who do not live locally. Patient's family practitioner is JOHNNY Corona. Patient prefers [facility]. Patient reports having a walker at home. Patient is motivated to participate in therapy.     PHYSICAL THERAPY  Sadia Gomez is a 74 y.o. female that presents with Spinal Stenosis of lumbar region and is s/p surgery. Pt demonstrates a decrease in baseline by way of bed mobility, transfers and ambulation secondary to decreased activity tolerance, strength, fatigue, and balance deficits. Pt is requiring Mod A for bed mobility, Min A for transfers, and Min A for ambulation of limited distances to about 20 feet. Pt was able to complete 1 step with bilateral handrails, but fatigued quickly. Pt limited by pain throughout session and needed increased encouragement to work through the discomfort during functional tasks. Pt irritable at times and demonstrated decreased safety awareness. Does have some RLE weakness evidenced by occasional buckling in R knee with gait and increased assist on steps when using RLE. Because of these deficits, Pt will benefit from 
This Pre Admission Screen is a refiled document from the acute stay. The Pre Admission was completed and signed on 2024 at 1014 by Dr Dennis.      [facility]  Acute Inpatient Rehab Preadmission Assessment     Patient Name: Sadia Gomez        Ethnicity:Not of , /a, or [place] origin  Race:White  MRN:   [id number]    : 1950  (74 y.o.)  Gender: female      Admitted from:[facility]  Initial Assessment     Date of admission to the hospital: 2024 12:22 PM  Date patient eligible for admission:2024     Primary Diagnosis: Lumbar stenosis with radiculopathy      Did patient have surgery?  yes - 1.  L2-S1 exploration of fusion  2.  T10-L2 bilateral laminectomy, partial medial facetectomies and foraminotomies of T10, T11, T12, L1, L2, nerve roots   3.  T10-S1 posterior spinal fusion extension using hang connectors  4.  T11-L1 posterior spinal instrumentation, Cortera, Xtant instrumentation; retained instrumentation L2-S1  5.  Use of local autograft bone    on 24 with Dr Dickinson     Physicians: Micah Dickinson MD, Dr Dennis, Dr Yañez     Risk for clinical complications/co-morbidities:   Past Medical History        Past Medical History:   Diagnosis Date    Arthritis      Diabetes mellitus (HCC)      Hypertension      MDRO (multiple drug resistant organisms) resistance      Sleep apnea with use of continuous positive airway pressure (CPAP)              Financial Information  Primary insurance: Medicare     Secondary Insurance:   BCBS     Has the patient had two or more falls in the past year or any fall with injury in the past year?   no     Did the patient have major surgery during the 100 days prior to admission?  yes     Precautions:   falls  Restrictions/Precautions: General Precautions, Fall Risk, Surgical Protocols  Other position/activity restrictions: montior buckling  Required Braces or Orthoses  Spinal: Thoracic Lumbar Sacral 
[facility]  Recreational Therapy  Discharge Note  Inpatient Rehabilitation Unit         Date:  12/2/2024       Patient Name: Sadia Gomez      MRN: [id number]       YOB: 1950 (74 y.o.)       Gender: female  Diagnosis: Spinal stenosis of lumbar regaion with radiculopathy  Referring Practitioner: Cass Dennis DO    Patient discharged from Recreational Therapy at this time.  See recreational therapy notes for details.    Electronically signed by: ELIANA Morin  Date: 12/2/2024   
[facility]  INPATIENT SPEECH THERAPY  [employer]  DAILY NOTE    Discharge Recommendations: SNF and Continue to Assess Pending Progress    SLP Individual Minutes  Time In: 1335  Time Out: 1405  Minutes: 30  Timed Code Treatment Minutes: 30 Minutes    Date: 2024  Patient Name: Sadia Gomez      CSN: [id number]   : 1950  (74 y.o.)  Gender: female   Referring Physician:  Cass Dennis DO  Diagnosis: Spinal stenosis of lumbar region with radiculopathy  Precautions: fall precautions  Current Diet: Regular diet and thin liquids  Respiratory Status: Room Air  Swallowing Strategies: Standard Universal Swallow Precautions  Date of Last MBS/FEES: Not Applicable    Pain:  10/10 \"whole body\"; Notified RN who then provided medication     Subjective:  Upon arrival, patient resting in bed. Patient alert and agreeable to skilled ST services. No family present. Pain level serving as barrier to task completion and success this date.     Short-Term Goals:  SHORT TERM GOAL #1:  Goal 1: Patient will complete functional immediate/delayed recall tasks with 80% accuracy, min cues to improve retention of pertinent, novel information r/t daily living requirements.  INTERVENTIONS:   Recall Spinal Precautions: 3/3 independent  *Reviewed acronym \"BLT\"; patient inquiring about length of time to follow precautions. ST encouraged to inquire to physician     SHORT TERM GOAL #2:  Goal 2: Patient will complete problem solving, verbal/visual reasoning, and executive functioning tasks (household obligations) with 80% accuracy, min cues to enhance success within ADLs/IADLs.  INTERVENTIONS:   Medication Management - MAR Review: ST provided patient with printed MAR and tasked patient with stating reason for taking each medication to facilitate improved ability to manage and organize medications upon discharge.  Knowledge of reason for taking/associated medical diagnosis:  indep,  min cues, 
[facility]  Recreational Therapy  Inpatient Rehabilitation Evaluation        Time Spent with Patient: 15 minutes    Date:  11/21/2024       Patient Name: Sadia Gomez      MRN: [id number]       YOB: 1950 (74 y.o.)       Gender: female  Diagnosis: Spinal stenosis of lumbar regaion with radiculopathy  Referring Practitioner: Cass Dennis DO    RESTRICTIONS/PRECAUTIONS:  Restrictions/Precautions: General Precautions, Fall Risk, Surgical Protocols     Hearing: Within functional limits    PAIN: 7    SUBJECTIVE:  pt lives with her -they have 3 children fairly close by    VISION:  Glasses for reading    HEARING: Within Normal Limit    LEISURE INTERESTS:   Pt use to sew, she and  go out to eat, she likes to play cards with friends and gave pt a deck of cards and some word search puzzles to use in her free time-pt is pleasant-her daughter is present and a sitter present- pt would like [employer] chicken for lunch on Friday and daughter paid the money     BARRIERS TO LEISURE INTERESTS:    Decreased endurance and Pain           Patient Education  New Education Provided: Importance of Leisure, RT Plan of Care    Plan:  Continue to follow patient through this admission  Include patient in appropriate groups  See patient individually    Electronically signed by: Aida Campbell, CTRS  Date: 11/21/2024  
[facility]  Occupational Therapy  Daily Note    Discharge Recommendations: Home with Home Health OT  Equipment Recommendations: Yes      Time In: 1000  Time Out: 1130  Timed Code Treatment Minutes: 90 Minutes  Minutes: 90      Date: 2024  Patient Name: Sadia Gomez,   Gender: female      Room: [facility]01/001-A  MRN: [id number]  : 1950  (74 y.o.)  Referring Practitioner: Cass Dennis DO  Diagnosis: Spinal stenosis of lumbar region with radiculopathy  Additional Pertinent Hx: Sadia Gomez is a 74 y.o. female with PMH significant for HTN, DM, and sleep apnea who was admitted to [facility] on 2024. She was admitted to acute IPR unit on 2024 History obtained via: ED documentation, acute care documentation, and patient.     Patient with history of an L2-S1 fusion in 2016.  Patient with history of constant low back pain with radiation of pain into bilateral hips and into the right lower extremity with numbness/tingling and weakness.  Per report, symptoms have been progressively worsening over time.  Patient symptoms aggravated by walking and alleviated with sitting.  Patient reportedly has failed medication management, PT, and injections.  Patient has followed with Dr. Dickinson as an outpatient and ultimately decision made to pursue surgical intervention.     Patient presented to [facility] on 2024 and underwent an elective T10-L2 decompression and fusion extension to T10, instrumentation to T11.  Patient reportedly tolerated procedure well.  Dr. Sullivan consulted for medical management.  Patient cleared for mobility with TLSO.  Therapy evaluated patient.  PM&R was consulted and ultimately patient determined to be a good candidate for an acute IPR stay in order to maximize her functional progress and independence prior to returning home.      On day of admission, patient is seen with son at bedside. Son reports confusion which he 
Stairs to enter without rails  Entrance Stairs - Number of Steps: 2 JOEY (pt reports she holds onto doorframe and uses single point cane)  Home Equipment: Walker - Rolling, Cane, Sock aid, Reacher, Long-handled shoehorn   Bathroom Shower/Tub:  (pt states walk in shower is not big enough for a walk in shower)  Bathroom Toilet: Handicap height  Bathroom Equipment: None    ADL Assistance: Independent  Homemaking Assistance: Independent  Homemaking Responsibilities: Yes  Ambulation Assistance: Independent  Transfer Assistance: Independent  Active : Yes  IADL Comments: Pt reports indep with all homemaking. Pt states she does all the cooking or they eat out. Spouse does assist with dishes and other homemaking tasks.  Additional Comments: Pt reports she was IND prior to recent back sx with use of single point cane. Per notes on acute care,  is not able to assist her physically but is currently taking care of the housework while she is recovering.    Restrictions/Precautions:  Restrictions/Precautions: General Precautions, Fall Risk, Surgical Protocols  Required Braces or Orthoses  Spinal: Thoracic Lumbar Sacral Orthotics  Position Activity Restriction  Spinal Precautions: No Bending, No Lifting, No Twisting  Other position/activity restrictions: Telesitter     SUBJECTIVE: Patient in recliner upon arrival and agreeable to therapy.      PAIN: not rated, reports \"a little\" at rest in chair, \"medium\" during ambulation    Vitals: Vitals not assessed per clinical judgement, see nursing flowsheet    OBJECTIVE:  Bed Mobility:  Not Tested    Transfers:  Sit to Stand: Contact Guard Assistance  Stand to Sit:Stand By Assistance, Contact Guard Assistance, X 1    Ambulation:  Contact Guard Assistance  Distance: 10ft x1, ~20ft x2  Surface: Level Tile  Device: Rolling Walker  Gait Deviations: Forward Flexed Posture, Slow Holley, Decreased Step Length Bilaterally, Decreased Weight Shift Right, Decreased Gait Speed, Decreased 
bearing Seated rest break needed between each trial of gait due to pain and leg weakness.     Balance:  Static Standing Balance: Stand By Assistance, X 1, with verbal cues with rolling walker  Dynamic Standing Balance: Contact Guard Assistance, X 1, with cues for safety, with verbal cues   Patient was able to static stand with rolling walker stand by assist for one minute 42 sec before she needed to rest. Completed reaching outside base of support with unilateral support on walker.     Exercise:  Patient was guided in 1 set(s) 20 reps of exercises to both lower extremities: ,Ankle pumps, Glut sets, Quad sets, Heelslides, Hip abduction/adduction, Seated marches, Seated hamstring curls, Seated heel/toe raises, Long arc quads with orange theraband and without. Seated isometric hip adduction, Standing heel/toe raises, and Standing hip abduction/adduction on right only as she was not able to bear weight on left to do so.  Exercises were completed for increased independence with functional mobility.    OT 11/23/2024:  ADL:   Upper Extremity Dressing: Minimal Assistance.  Set-up for pull over sweater, min A for lumbar TLSO corset   Lower Extremity Dressing: Contact Guard Assistance.  Pt was able to thread BLES through pants and depends with use of reacher and increased time, CGA for balance during clothing management   Footwear Management: Minimal Assistance.  Donning shoes.   Toileting: Minimal Assistance.  Pt able to manage clothing, but required assist for bowel hygiene   Toilet Transfer: Contact Guard Assistance. STS .     IADL:   Basic Housekeeping: Patient engaged into dynamic standing IADL of folding linens, focus on challenging standing tolerance & balance with bilateral UE release and engagement, pt stood x 2 min with CGA and bilateral UE release. Noted pt demo improved standing tolerance with distractions while holding conversation with this writer & her daughter.      BALANCE:  Sitting Balance:  Supervision.  
confused but it seems to have worsened over the past couple of days. She states that she will give incorrect answer and she immediately knows that  it is not what she meant to say. She denies any cough or sore throat. No reports of any CP or SOB. No reports of any significant changes to bowel or bladder. She denies any dysuria or increased urinary frequency. Prior to admission she was independent with ADLs and mobility with use of a single tip cane.  She resides with her  in a single-story house with 2 steps enter.   is not able to provide significant physical support on discharge.     Patient attempted to get up several times on her own on arrival to rehab despite education. Due to confusion a telesitter was put in place.    Restrictions/Precautions:  Restrictions/Precautions: General Precautions, Fall Risk, Surgical Protocols            Position Activity Restriction  Spinal Precautions: No Bending, No Lifting, No Twisting  Other Position/Activity Restrictions: .    Required Braces or Orthoses  Spinal: Thoracic Lumbar Sacral Orthotics    Past Medical History:   Diagnosis Date    Arthritis     Diabetes mellitus (HCC)     Hypertension     MDRO (multiple drug resistant organisms) resistance     Sleep apnea with use of continuous positive airway pressure (CPAP)      Past Surgical History:   Procedure Laterality Date    BACK SURGERY      FOOT SURGERY Bilateral     KNEE ARTHROPLASTY Bilateral     LUMBAR FUSION N/A 11/12/2024    Thoracic 12-Lumbar 2 Decompression and Fusion Extension to Thoraci 10 performed by Micah Dickinson MD at [facility] OR    SPINAL CORD DECOMPRESSION  5/20/16    L2-S1--Dr Dickinson    SPINAL FUSION  5/20/16    L2-S1--Dr Dickinson           Subjective  Pt. Not seen at time of d/c completion as pt d/c to SNF for further medical care.    Assessment:  Assessment: Patient has progressed with OT POC, however pt still requires assistance for ADLs, IADLs, mobility and transfers.  Pt. Appropriate 
extremity weight bearing Seated rest break needed between each trial of gait due to pain and leg weakness.     Balance:  Static Standing Balance: Stand By Assistance, X 1, with verbal cues with rolling walker  Dynamic Standing Balance: Contact Guard Assistance, X 1, with cues for safety, with verbal cues   Patient was able to static stand with rolling walker stand by assist for one minute 42 sec before she needed to rest. Completed reaching outside base of support with unilateral support on walker.     Exercise:  Patient was guided in 1 set(s) 20 reps of exercises to both lower extremities: ,Ankle pumps, Glut sets, Quad sets, Heelslides, Hip abduction/adduction, Seated marches, Seated hamstring curls, Seated heel/toe raises, Long arc quads with orange theraband and without. Seated isometric hip adduction, Standing heel/toe raises, and Standing hip abduction/adduction on right only as she was not able to bear weight on left to do so.  Exercises were completed for increased independence with functional mobility.    OT 11/23/2024:  ADL:   Upper Extremity Dressing: Minimal Assistance.  Set-up for pull over sweater, min A for lumbar TLSO corset   Lower Extremity Dressing: Contact Guard Assistance.  Pt was able to thread BLES through pants and depends with use of reacher and increased time, CGA for balance during clothing management   Footwear Management: Minimal Assistance.  Donning shoes.   Toileting: Minimal Assistance.  Pt able to manage clothing, but required assist for bowel hygiene   Toilet Transfer: Contact Guard Assistance. STS .     IADL:   Basic Housekeeping: Patient engaged into dynamic standing IADL of folding linens, focus on challenging standing tolerance & balance with bilateral UE release and engagement, pt stood x 2 min with CGA and bilateral UE release. Noted pt demo improved standing tolerance with distractions while holding conversation with this writer & her daughter.      BALANCE:  Sitting Balance:  
kg (164 lb 10.9 oz)   SpO2 100%   BMI 29.17 kg/m²     General appearance: alert, appears stated age, and cooperative  Head: Normocephalic, without obvious abnormality, atraumatic  Lungs: clear to auscultation bilaterally  Heart: regular rate and rhythm, S1, S2 normal, no murmur, click, rub or gallop  Abdomen: soft, non-tender; bowel sounds normal; no masses,  no organomegaly  Extremities: extremities normal, atraumatic, no cyanosis or edema  Skin: Skin color, texture, turgor normal. No rashes or lesions  Neurologic:  weak    Oral membranes look well    Data Review:    Latest Reference Range & Units 11/29/24 07:35 11/30/24 07:29   POC Glucose 70 - 108 mg/dl 124 (H) 127 (H)   (H): Data is abnormally high    Electronically signed by Bar Yañez MD on 11/30/2024 at 7:12 PM                                
Assistance    Ambulation:  Stand By Assistance, Contact Guard Assistance  Distance: 10' x 1, 60' x 1, multiple shorter distances, up/down 5' ramp  Surface: Level Tile and Ramp  Device: Rolling Walker  Gait Deviations: Slow Holley, Decreased Step Length Bilaterally, Decreased Weight Shift Right, Decreased Gait Speed, Decreased Terminal Knee Extension, Increased reliance on assistive device, and Trendelenburg Gait     Stairs:  Stairs: 4\" steps X 6 using Rolling Walker and Contact Guard Assistance.  Platform: 6\" platform X 1 using Rolling Walker and Contact Guard Assistance and VCs.    Balance:  Pt. Able to  an object from floor using long handled reacher and Rolling walker for support with Stand By Assistance     Neuro Re-ed  Pt. Completed standing and Stepping activity tapping numbered pods with alternating heels using Bilateral UE support on Rolling Walker to improve hip/quad stability and lateral weight shifting for improved functional mobility.     Exercise:  Patient was guided in 1 set(s) 10 reps of exercises to both lower extremities: Glut sets, Seated marches, Seated hamstring curls, Seated heel/toe raises, Long arc quads, Seated isometric hip adduction, Seated abduction/adduction, and Abdominal isometrics, and standing heel raises . Rest breaks required throughout. Exercises were completed for increased independence with functional mobility.    Functional Outcome Measures:  Not completed  Modified Wirt Scale:  Not Applicable    ASSESSMENT:  Assessment: Patient progressing toward established goals.  Activity Tolerance:  Patient tolerance of  treatment:Good.  Plan: Current Treatment Recommendations: Strengthening, Balance training, Gait training, Stair training, Functional mobility training, Transfer training, Endurance training, Patient/Caregiver education & training, Safety education & training, Therapeutic activities, Home exercise program, Positioning  General Plan:  (5x/week at 90 
LTGs this therapy reporting period. Patient with demonstrated gains in executive functioning, reasoning, thought organization, and attention. Patient continues to present with deficits in recalls. Patient expressive and receptive language WFL. Patient speech and voice WFL. Patient currently safely consuming a regular diet with thin liquids. Patient would benefit from skilled ST services to address aforementioned skills with recommendations for SNF at discharge, clearance from physician and/or driving evaluation prior to return to driving, and supervision with ADL/IADL completion.     Activity Tolerance:  Patient tolerance of treatment: Appropriate participation.      Assessment/Plan: Patient discharged from Speech Therapy at this time due to plans to discharge SNF on 12/2/2024.  Discharge Disposition: SNF.  Continued Speech Therapy Services recommended: Yes      Cally Sung M.A., CCC-SLP [id number]      
achievement of established goals and plan of care..     Plan for Next Session: novel recall, attention, time management, navigation task      Fco Brown M.S., CCC-SLP [id number]    
DRESSING:Partial/moderate assistance (Utilizing dressing stick to thread LEs with difficulty noted. CGA for standing balance while pulling pants downward and upward.).   .  CARE Score: 3.     FOOTWEAR:Partial/moderate assistance (To don B SELENE hose, able to don slip on shoes with setup)   .  CARE Score: 3.     TOILET TRANSFER: Supervision or touching assistance (To/from STS using grab bars with increased time).   . CARE Score: 4.       CHG was utilized during bathing ADLs this session with skilled education on role of CHG re: infection prevention and how to utilize within ADLs safely.  Patient demonstrated fair understanding.     Increased time required during BADL routine due to pain in back as patient requested lengthy rest breaks throughout     Modifed Barthel Index administered this date with pt scoring 65/100 indicating moderate dependency with daily task completion.    IADL:   Not Tested    BALANCE:  Sitting Balance:  Modified Independent.    Standing Balance: Contact Guard Assistance. - SBA (more CGA when pain in back increased)     BED MOBILITY:  Rolling to Left: Supervision - using side rail  Supine to Sit: Stand By Assistance - using side rail, increased time, HOB slightly elevated    TRANSFERS:  Sit to Stand:  Contact Guard Assistance. - SBA from various surfaces with increased time.  Stand to Sit: Contact Guard Assistance. - SBA to various surfaces with increased time, min verbal cues for hand placement    FUNCTIONAL MOBILITY:  Assistive Device: Rolling Walker  Assist Level:  Contact Guard Assistance.   Distance: To and from bathroom  Very slow pace, occasional standing rest break due to pain. Lengthy seated rest break after each trial of mobility. Min verbal cues for walker safety within bathroom     ADDITIONAL ACTIVITIES:  Patient completed BUE strengthening exercises with skilled education on HEP: completed x15 reps x1 set with a 1# dumb bell in all joints and all planes in order to improve UE strength 
Not Applicable    ASSESSMENT:     Activity Tolerance:  Patient tolerance of  treatment: Good treatment tolerance       Plan: Times Per Week: 5x/wk for 90min  Current Treatment Recommendations: Strengthening, Balance training, Functional mobility training, Endurance training, Neuromuscular re-education, Safety education & training, Self-Care / ADL, Equipment evaluation, education, & procurement    Education:  Learners: Patient  Plan of Care, ADL's, Reviewed Prior Education, Home Safety, and Importance of Increasing Activity    Goals  Short Term Goals  Time Frame for Short Term Goals: 1 week  Short Term Goal 1: Pt will perform toileting routine maintaining spinal precautions with Minimal A to improve indep with ADL routines.  Short Term Goal 2: Pt will tolerate dynamic standing x5 min with 1-2 UE release at SBA to improve balance and activity tolerance required for sinkside I/ADLs.  Short Term Goal 3: Pt will complete LB ADLs with LHAE and no additional cues for maintaining spinal precautions to improve indep with ADL routines.  Short Term Goal 4: Pt will completed item retrieval/transport x5 items from various surface heights with CGA and no additional cues for maintaining spinal precautions to improve indep with ADL prep and clean up.  Short Term Goal 5: Pt attend to and sequence simple homemaking task with Min A and min VC for safety and problem solving to increase indep with meal prep  Long Term Goals  Time Frame for Long Term Goals : 2 weeks from evaluation  Long Term Goal 1: Pt will complete multi-step homemaking task with Sup and 0-2 VC for spinal precuations to increase indep with meal prep  Long Term Goal 2: Pt will complete BADL routine with Sup and 0-2 VC for safety and spinal precautions to increase indep with self care  Long Term Goal 3: Pt will safely complete tub or shower transfer with 0-2 VC for spinal precautions and safety to increase indep with showering  Long Term Goal 4: Pt will demo improvement 
activity due to pain levels/fatigue.     BALANCE:  Sitting Balance:  Modified Independent.    Standing Balance: Contact Guard Assistance.  To light minimal assist - Brief standing, highly limited by pain levels.     BED MOBILITY:  Sit to Supine: Moderate Assistance, X 1 - assist with BLEs.    TRANSFERS:  Sit to Stand:  Contact Guard Assistance. From recliner and w/c   Stand to Sit: Contact Guard Assistance. Cues for hand placement     FUNCTIONAL MOBILITY:  Assistive Device: Rolling Walker  Assist Level:  Contact Guard Assistance to minimal assist    Distance:  within room, within apartment during IADL, < 6 ft each event  Few episodes of R knee buckling, requiring to sit urgently. W/c kept close.  Did notify nursing staff of her knee buckling and recommendation to use BSC at this time for fall prevention.     ADDITIONAL ACTIVITIES:  Patient completed BUE strengthening exercises with skilled education on HEP: completed x1 reps x1 set with a 1# free wt in all joints and all planes in order to improve UE strength and activity tolerance required for BADL routine and toilet / shower transfers. Patient tolerated well, requiring min rest breaks. Patient also required min cues for technique.   HEP Handout provided        Modified [place] Scale:  Not Applicable    ASSESSMENT:     Activity Tolerance:  Patient tolerance of  treatment: Poor treatment tolerance, Limited by pain, Reduced activity pace, and Need for increased rest breaks       Plan: Times Per Week: 5x/wk for 90min  Current Treatment Recommendations: Strengthening, Balance training, Functional mobility training, Endurance training, Neuromuscular re-education, Safety education & training, Self-Care / ADL, Equipment evaluation, education, & procurement    Education:  Learners: Patient  ADL's, IADL's, Home Exercise Program, Precautions, Equipment Education, Reviewed Prior Education, and Home Safety    Goals  Short Term Goals  Time Frame for Short Term Goals: 1 
available planes to address musculature surrounding the scap, GH, elbow and wrist joints. Task performed to improve BUE strength for ease with ADL transfers and ADL based routines.  Task ended shorter in duration due to increased pain.          Modified [place] Scale:  Not Applicable    ASSESSMENT:     Activity Tolerance:  Patient tolerance of  treatment: Fair treatment tolerance, Limited by pain, and Need for increased rest breaks       Plan: Times Per Week: 5x/wk for 90min  Current Treatment Recommendations: Strengthening, Balance training, Functional mobility training, Endurance training, Neuromuscular re-education, Safety education & training, Self-Care / ADL, Equipment evaluation, education, & procurement    Education:  Learners: Patient  Home Exercise Program and Assistive Device Safety    Goals  Short Term Goals  Time Frame for Short Term Goals: 1 week  Short Term Goal 1: Pt will perform toileting routine maintaining spinal precautions with Minimal A to improve indep with ADL routines.  Short Term Goal 2: Pt will tolerate dynamic standing x5 min with 1-2 UE release at SBA to improve balance and activity tolerance required for sinkside I/ADLs.  Short Term Goal 3: Pt will complete LB ADLs with LHAE and no additional cues for maintaining spinal precautions to improve indep with ADL routines.  Short Term Goal 4: Pt will completed item retrieval/transport x5 items from various surface heights with CGA and no additional cues for maintaining spinal precautions to improve indep with ADL prep and clean up.  Short Term Goal 5: Pt attend to and sequence simple homemaking task with Min A and min VC for safety and problem solving to increase indep with meal prep  Long Term Goals  Time Frame for Long Term Goals : 2 weeks from evaluation  Long Term Goal 1: Pt will complete multi-step homemaking task with Sup and 0-2 VC for spinal precuations to increase indep with meal prep  Long Term Goal 2: Pt will complete BADL routine 
conversation  Mood/affect: Calm  General appearance: Patient is well nourished, well developed, well groomed and in no acute distress     Memory:   Not formally assessed, however, patient is able to provide basic history  Attention/Concentration: Patient follows content of conversation  Language:  normal     Cranial Nerves:  cranial nerves II-XII are grossly intact  ROM:  abnormal -BLE  Motor Exam: BLE: 5/5 KE, 5/5 strength bilateral ankle dorsiflexion   Muscle bulk: within normal limits  Sensory:  Sensory intact to light touch  Gait: Not assessed     Heart: Well-perfused extremities, RRR, no m/r/g noted   Lungs: Breathing comfortably on room air without any increased work of breathing; CTAB  Abdomen: Nondistended  Skin: warm and dry, no rash or erythema on exposed skin      Diagnostics:   Recent Results (from the past 24 hour(s))   CBC with Auto Differential    Collection Time: 11/27/24  6:57 AM   Result Value Ref Range    WBC 9.0 4.8 - 10.8 thou/mm3    RBC 3.43 (L) 4.20 - 5.40 mill/mm3    Hemoglobin 10.7 (L) 12.0 - 16.0 gm/dl    Hematocrit 33.5 (L) 37.0 - 47.0 %    MCV 97.7 81.0 - 99.0 fL    MCH 31.2 26.0 - 33.0 pg    MCHC 31.9 (L) 32.2 - 35.5 gm/dl    RDW-CV 13.2 11.5 - 14.5 %    RDW-SD 47.2 (H) 35.0 - 45.0 fL    Platelets 499 (H) 130 - 400 thou/mm3    MPV 9.3 (L) 9.4 - 12.4 fL    Seg Neutrophils 74.9 %    Lymphocytes 13.2 %    Monocytes % 8.1 %    Eosinophils 1.3 %    Basophils 0.4 %    Immature Granulocytes % 2.1 %    Neutrophils Absolute 6.7 1.8 - 7.7 thou/mm3    Lymphocytes Absolute 1.2 1.0 - 4.8 thou/mm3    Monocytes Absolute 0.7 0.4 - 1.3 thou/mm3    Eosinophils Absolute 0.1 0.0 - 0.4 thou/mm3    Basophils Absolute 0.0 0.0 - 0.1 thou/mm3    Immature Grans (Abs) 0.19 (H) 0.00 - 0.07 thou/mm3    nRBC 0 /100 wbc   Basic Metabolic Panel    Collection Time: 11/27/24  6:57 AM   Result Value Ref Range    Sodium 134 (L) 135 - 145 meq/L    Potassium 3.8 3.5 - 5.2 meq/L    Chloride 100 98 - 111 meq/L    CO2 23 23 - 
hamstring curls, Seated heel/toe raises, Long arc quads, Seated isometric hip adduction, and Seated abduction/adduction.  Exercises were completed for increased independence with functional mobility.    Functional Outcome Measures:  Not completed  Modified [place] Scale:  Not Applicable    ASSESSMENT:  Assessment: Patient progressing toward established goals.  Activity Tolerance:  Patient tolerance of  treatment:Good.  Plan: Current Treatment Recommendations: Strengthening, Balance training, Gait training, Stair training, Functional mobility training, Transfer training, Endurance training, Patient/Caregiver education & training, Safety education & training, Therapeutic activities, Home exercise program, Positioning  General Plan:  (5x/week at 90 minutes)    Education:  Learners: Patient  Patient Education: Plan of Care, Precautions/Restrictions, Bed Mobility, Transfers, Reviewed Prior Education, Gait, Stairs, Health Promotion and Wellness Education, Home Safety Education, - Patient Requires Continued Education    Goals:  Patient Goals : Pt goal to go home and have less pain.  Short Term Goals  Time Frame for Short Term Goals: 1 week  Short Term Goal 1: Supine to/from sit via log rolling technique and no bedrails at CGA in order to get in/out of bed.  Short Term Goal 2: Sit to/from stand at consistent SBA in order to get up to walk.  Short Term Goal 3: Ambulate 50 feet with RW and TLSO at consistent CGA in order to walk in home safely.  Short Term Goal 4: Ascend/Descend 2 steps with 1 handrail and single point cane in order to simulate home entry at CGA in order to enter/exit home.  Short Term Goal 5: Car Transfer at SBA in order to meet transportation needs.  Additional Goals?: Yes  Short Term Goal 6: Patient will improve TUG to less than or equal to 45 seconds to improve transfers and gait and reduce her risk for falls.  Long Term Goals  Time Frame for Long Term Goals : 2 weeks  Long Term Goal 1: Supine to/from sit 
Per Week: 5x/wk for 90min  Current Treatment Recommendations: Strengthening, Balance training, Functional mobility training, Endurance training, Neuromuscular re-education, Safety education & training, Self-Care / ADL, Equipment evaluation, education, & procurement    Education:  Learners: Patient  ADL's, IADL's, Precautions, Importance of Increasing Activity, and Assistive Device Safety    Goals  Short Term Goals  Time Frame for Short Term Goals: 1 week  Short Term Goal 1: Pt will perform toileting routine maintaining spinal precautions with Minimal A to improve indep with ADL routines.  Short Term Goal 2: Pt will tolerate dynamic standing x5 min with 1-2 UE release at SBA to improve balance and activity tolerance required for sinkside I/ADLs.  Short Term Goal 3: Pt will complete LB ADLs with LHAE and no additional cues for maintaining spinal precautions to improve indep with ADL routines.  Short Term Goal 4: Pt will completed item retrieval/transport x5 items from various surface heights with CGA and no additional cues for maintaining spinal precautions to improve indep with ADL prep and clean up.  Short Term Goal 5: Pt attend to and sequence simple homemaking task with Min A and min VC for safety and problem solving to increase indep with meal prep  Long Term Goals  Time Frame for Long Term Goals : 2 weeks from evaluation  Long Term Goal 1: Pt will complete multi-step homemaking task with Sup and 0-2 VC for spinal precuations to increase indep with meal prep  Long Term Goal 2: Pt will complete BADL routine with Sup and 0-2 VC for safety and spinal precautions to increase indep with self care  Long Term Goal 3: Pt will safely complete tub or shower transfer with 0-2 VC for spinal precautions and safety to increase indep with showering  Long Term Goal 4: Pt will demo improvement in occupational performance as evidenced by modified barthel index of 61/100    Following session, patient left in safe position with all 
control descent to various surfaces.     FUNCTIONAL MOBILITY:  Assistive Device: Rolling Walker and (4 wheels)  Assist Level:  Minimal Assistance.   Distance: To and from bathroom and Within room  Very slow pace, VC for upright posture and elongated steps.  Some knee buckling with pain and fatigue.      Additional Activities:  Modifed Barthel Index administered this date with pt scoring 51/100 indicating severe dependency with daily task completion.      Activity Tolerance:  Patient tolerance of  treatment: Fair treatment tolerance- limited by confusion and hallucination and agitation.       Modified [place]:  Premorbid Functional Status: Not Applicable  Current Functional Status:  Not Applicable    Assessment:  Assessment: Pt presented with the listed deficits s/p admission with spinal stenosis. Pt currently demos an overall decrease in balance, strength, endurance, and activity tolerance. Pt requires increased A with all ADLs and IADLs compared to PLOF. Pt requires Min - Mod A for transfers d/t increased pain and impulsivity. Pt is able to complete functional mobility with Min A and increased time with VC for upright posture. Pt requires Max A for LB dressing and bathing and max VC for recalling spinal precautions. Pt is able to complete UB ADLs with Max A for TLSO mgmt. Pt required dep for footwear mgmt. requires max A for toileting tasks. Pt is highly limited by decreased cognition, confusion, pain, decreased strength, balance, and endurance. Pt requires cont skilled OT intervention to address the listed deficits and increase overall balance, endurance, safety, and activity tolerance to maximize safety and indep with ADLs and IADLs in prep for a safe discharge home.  Performance deficits / Impairments: Decreased functional mobility , Decreased ADL status, Decreased endurance, Decreased strength, Decreased safe awareness, Decreased balance, Decreased coordination, Decreased high-level IADLs  Prognosis: 
precautions this date when addressed.         Modified [place] Scale:  Not Applicable    ASSESSMENT:     Activity Tolerance:  Patient tolerance of  treatment: Fair treatment tolerance and Limited by pain       Plan: Times Per Week: 5x/wk for 90min  Current Treatment Recommendations: Strengthening, Balance training, Functional mobility training, Endurance training, Neuromuscular re-education, Safety education & training, Self-Care / ADL, Equipment evaluation, education, & procurement    Education:  Learners: Patient  Importance of Increasing Activity and Assistive Device Safety    Goals  Short Term Goals  Time Frame for Short Term Goals: 1 week  Short Term Goal 1: Pt will perform toileting routine maintaining spinal precautions with Minimal A to improve indep with ADL routines.  Short Term Goal 2: Pt will tolerate dynamic standing x5 min with 1-2 UE release at SBA to improve balance and activity tolerance required for sinkside I/ADLs.  Short Term Goal 3: Pt will complete LB ADLs with LHAE and no additional cues for maintaining spinal precautions to improve indep with ADL routines.  Short Term Goal 4: Pt will completed item retrieval/transport x5 items from various surface heights with CGA and no additional cues for maintaining spinal precautions to improve indep with ADL prep and clean up.  Short Term Goal 5: Pt attend to and sequence simple homemaking task with Min A and min VC for safety and problem solving to increase indep with meal prep  Long Term Goals  Time Frame for Long Term Goals : 2 weeks from evaluation  Long Term Goal 1: Pt will complete multi-step homemaking task with Sup and 0-2 VC for spinal precuations to increase indep with meal prep  Long Term Goal 2: Pt will complete BADL routine with Sup and 0-2 VC for safety and spinal precautions to increase indep with self care  Long Term Goal 3: Pt will safely complete tub or shower transfer with 0-2 VC for spinal precautions and safety to increase indep with 
skilled OT to improve safety & indep within ADL / IADL routines.  Plan: Times Per Week: 5x/wk for 90min  Current Treatment Recommendations: Strengthening, Balance training, Functional mobility training, Endurance training, Neuromuscular re-education, Safety education & training, Self-Care / ADL, Equipment evaluation, education, & procurement    Education:  Learners: Patient  ADL's, Home Exercise Program, Precautions, Equipment Education, Reviewed Prior Education, and Home Safety    Goals  Patient goals : To return home    Time Frame for Short Term Goals: 1 week  Short Term Goal 1: Pt will perform toileting routine maintaining spinal precautions with Minimal A to improve indep with ADL routines. GOAL NOT MET. CONTINUE.  Short Term Goal 2: Pt will tolerate dynamic standing x5 min with 1-2 UE release at SBA to improve balance and activity tolerance required for sinkside I/ADLs. GOAL NOT MET. CONTINUE.  Short Term Goal 3: Pt will complete LB ADLs with LHAE and no additional cues for maintaining spinal precautions to improve indep with ADL routines. GOAL NOT MET. CONTINUE.  Short Term Goal 4: Pt will completed item retrieval/transport x5 items from various surface heights with CGA and no additional cues for maintaining spinal precautions to improve indep with ADL prep and clean up. GOAL NOT MET. CONTINUE.  Short Term Goal 5: Pt attend to and sequence simple homemaking task with Min A and min VC for safety and problem solving to increase indep with meal prep GOAL MET AND REVISED    Time Frame for Long Term Goals : 2 weeks from evaluation  Long Term Goal 1: Pt will complete multi-step homemaking task with Sup and 0-2 VC for spinal precuations to increase indep with meal prep GOAL NOT MET. CONTINUE.  Long Term Goal 2: Pt will complete BADL routine with Sup and 0-2 VC for safety and spinal precautions to increase indep with self care GOAL NOT MET. CONTINUE.  Long Term Goal 3: Pt will safely complete tub or shower transfer 
an inpatient rehabilitation program involving at least 3 hours per day, 5 days per week of intensive rehabilitation.  Rehabilitation services will include PT, OT, and SLP/RT in order to improve functional status prior to discharge.  Family education and training will be completed.  Equipment evaluations and recommendations will be completed as appropriate.       Rehabilitation nursing will be involved for bowel, bladder, skin, and pain management.  Nursing will also provide education and training to patient and family.    Dr. Yañez consulted for medical management  Lumbar stenosis with radiculopathy: s/p elective T10-L2 decompression and fusion extension to T10, instrumentation to T11 by Dr. Dickinson on 11/12/2204. Drains out  T2DM: Continue alogliptin 12.5 mg daily, glipizide 10 mg daily, and metformin 1000 mg BID.  Discussed with pharmacy, Januvia non formulary  HTN: Continue amlodipine 10 mg daily, losartan 50 mg daily, and maxzide-25 mg daily  HLD: Continue pravastatin 20 mg daily  Post op anemia: Hgb stable at 9.8 on admission labs  AMS: hallucinations: UA negative, labs reviewed and stable.  No signs or symptoms of infection.  I feel that this is likely related to medications including oxycodone and Flexeril.  Will DC both Flexeril and oxycodone.  Will initiate Skelaxin as it is less likely to cause cognitive impairments.  Additionally, will schedule Tylenol and order as needed tramadol.  Finally, due to agitation, will order as needed Seroquel 25 mg nightly.  Prophylaxis:  DVT: Patient is POD 7 and lumbar drain to remove several days ago.  Will start Lovenox for DVT prophylaxis  Pain: Continue PRN tylenol, PRN flexeril 5 mg TID and PRN oxycodone 5/10  11/19: Due to AMS and hallucinations(see above), Will DC both Flexeril and oxycodone.  Will initiate Skelaxin as it is less likely to cause cognitive impairments.  Additionally, will schedule Tylenol and order as needed tramadol.  Nutrition:  Consultation to

## (undated) DEVICE — TUBING, SUCTION, 1/4" X 20', STRAIGHT: Brand: MEDLINE INDUSTRIES, INC.

## (undated) DEVICE — PACK-MAJOR

## (undated) DEVICE — COVER,LIGHT HANDLE,FLX,2/PK: Brand: MEDLINE INDUSTRIES, INC.

## (undated) DEVICE — BIPOLAR SEALER 23-112-1 AQM 6.0: Brand: AQUAMANTYS ®

## (undated) DEVICE — SOLUTION IRRIG 1000ML 0.9% SOD CHL USP POUR PLAS BTL

## (undated) DEVICE — PROBE STIM 3 MM FOR PEDCL SCREW DISP

## (undated) DEVICE — SPONGE DRN W4XL4IN RAYON/POLYESTER 6 PLY NONWOVEN PRECUT 2 PER PK

## (undated) DEVICE — Device

## (undated) DEVICE — SOLUTION IV 100ML 0.9% SOD CHL PLAS CONT USP VIAFLX 1 PER

## (undated) DEVICE — GLOVE SURG SZ 65 THK91MIL LTX FREE SYN POLYISOPRENE

## (undated) DEVICE — PAD,NON-ADHERENT,3X8,STERILE,LF,1/PK: Brand: MEDLINE

## (undated) DEVICE — GAUZE,SPONGE,4"X4",12PLY,STERILE,LF,2'S: Brand: MEDLINE

## (undated) DEVICE — 4.0MM PRECISION ROUND

## (undated) DEVICE — DRESSING TRNSPAR W4XL10IN FLM MIC POR SURESITE 123

## (undated) DEVICE — SPK10281 JACKSON TABLE KIT: Brand: SPK10281 JACKSON TABLE KIT

## (undated) DEVICE — CARTRIDGE: Brand: PREP PLUS

## (undated) DEVICE — SUTURE VICRYL + 1 L18IN ABSRB UD CTX L48MM 1/2 CIR TAPERPOINT

## (undated) DEVICE — GOWN,SIRUS,NON REINFRCD,LARGE,SET IN SL: Brand: MEDLINE

## (undated) DEVICE — 450 ML BOTTLE OF 0.05% CHLORHEXIDINE GLUCONATE IN 99.95% STERILE WATER FOR IRRIGATION, USP AND APPLICATOR.: Brand: IRRISEPT ANTIMICROBIAL WOUND LAVAGE

## (undated) DEVICE — DRESSING TRNSPAR W5XL4.5IN FLM SHT SEMIPERMEABLE WIND

## (undated) DEVICE — DRESSING TRNSPAR W8XL12IN FLM SURESITE 123

## (undated) DEVICE — DRAIN SURG 10FR PVC TB W/ TRCR MID PERF NO RESVR HUBLESS

## (undated) DEVICE — LINE ASPIR 1/4 ANTICOAG

## (undated) DEVICE — RESERVOIR BLD COLLCTN BTM OUTLETXTRAXRES

## (undated) DEVICE — SPONGE GZ W4XL4IN COT 12 PLY TYP VII WVN C FLD DSGN STERILE

## (undated) DEVICE — MEDIUM BLADE: Brand: MILL PLUS

## (undated) DEVICE — KIT EVAC 400CC PVC RADPQ Y CONN

## (undated) DEVICE — SUTURE VICRYL + SZ 2-0 L18IN ABSRB VLT L26MM SH 1/2 CIR VCP775D

## (undated) DEVICE — SET AUTOTRNS C175ML BOWL BTM OUTLT RESERVOIRXTRA

## (undated) DEVICE — SPONGE,NEURO,1"X1",XR,STRL,LF,10/PK: Brand: MEDLINE

## (undated) DEVICE — COVER,TABLE,44X90,STERILE: Brand: MEDLINE

## (undated) DEVICE — 1LYRTR 16FR10ML100%SILTMPS SNP: Brand: MEDLINE INDUSTRIES, INC.

## (undated) DEVICE — AGENT HEMOSTATIC SURGIFLOW MATRIX KIT W/THROMBIN

## (undated) DEVICE — GLOVE SURG SZ 85 L12IN FNGR ORTHO 126MIL CRM LTX FREE